# Patient Record
Sex: MALE | Race: WHITE | NOT HISPANIC OR LATINO | Employment: OTHER | URBAN - METROPOLITAN AREA
[De-identification: names, ages, dates, MRNs, and addresses within clinical notes are randomized per-mention and may not be internally consistent; named-entity substitution may affect disease eponyms.]

---

## 2018-01-15 ENCOUNTER — DOCUMENTATION ONLY (OUTPATIENT)
Dept: SURGERY | Facility: CLINIC | Age: 43
End: 2018-01-15

## 2018-11-15 ENCOUNTER — TRANSFERRED RECORDS (OUTPATIENT)
Dept: HEALTH INFORMATION MANAGEMENT | Facility: CLINIC | Age: 43
End: 2018-11-15

## 2019-01-25 ENCOUNTER — TRANSFERRED RECORDS (OUTPATIENT)
Dept: HEALTH INFORMATION MANAGEMENT | Facility: CLINIC | Age: 44
End: 2019-01-25

## 2019-02-05 ENCOUNTER — TELEPHONE (OUTPATIENT)
Dept: FAMILY MEDICINE | Facility: CLINIC | Age: 44
End: 2019-02-05

## 2019-02-05 ENCOUNTER — OFFICE VISIT (OUTPATIENT)
Dept: FAMILY MEDICINE | Facility: CLINIC | Age: 44
End: 2019-02-05
Payer: COMMERCIAL

## 2019-02-05 VITALS
HEIGHT: 75 IN | BODY MASS INDEX: 39.17 KG/M2 | WEIGHT: 315 LBS | DIASTOLIC BLOOD PRESSURE: 112 MMHG | OXYGEN SATURATION: 96 % | HEART RATE: 68 BPM | RESPIRATION RATE: 16 BRPM | SYSTOLIC BLOOD PRESSURE: 166 MMHG | TEMPERATURE: 97.9 F

## 2019-02-05 DIAGNOSIS — Z23 NEED FOR PROPHYLACTIC VACCINATION AND INOCULATION AGAINST INFLUENZA: ICD-10-CM

## 2019-02-05 DIAGNOSIS — M10.9 GOUT, UNSPECIFIED CAUSE, UNSPECIFIED CHRONICITY, UNSPECIFIED SITE: ICD-10-CM

## 2019-02-05 DIAGNOSIS — F41.1 ANXIETY STATE: ICD-10-CM

## 2019-02-05 DIAGNOSIS — R73.03 PREDIABETES: ICD-10-CM

## 2019-02-05 DIAGNOSIS — E55.9 VITAMIN D DEFICIENCY: ICD-10-CM

## 2019-02-05 DIAGNOSIS — M51.26 LUMBAR HERNIATED DISC: ICD-10-CM

## 2019-02-05 DIAGNOSIS — E66.01 MORBID OBESITY (H): ICD-10-CM

## 2019-02-05 DIAGNOSIS — D68.51 FACTOR V LEIDEN (H): ICD-10-CM

## 2019-02-05 DIAGNOSIS — I10 ESSENTIAL HYPERTENSION: Primary | ICD-10-CM

## 2019-02-05 PROBLEM — J30.9 ALLERGIC RHINITIS: Status: ACTIVE | Noted: 2019-02-05

## 2019-02-05 PROBLEM — J01.90 ACUTE SINUSITIS, UNSPECIFIED: Status: RESOLVED | Noted: 2019-02-05 | Resolved: 2019-02-05

## 2019-02-05 PROBLEM — J01.90 ACUTE SINUSITIS, UNSPECIFIED: Status: ACTIVE | Noted: 2019-02-05

## 2019-02-05 PROBLEM — J85.2 ABSCESS OF LUNG (H): Status: ACTIVE | Noted: 2019-02-05

## 2019-02-05 LAB
ALBUMIN SERPL-MCNC: 3.6 G/DL (ref 3.4–5)
ALP SERPL-CCNC: 66 U/L (ref 40–150)
ALT SERPL W P-5'-P-CCNC: 42 U/L (ref 0–70)
ANION GAP SERPL CALCULATED.3IONS-SCNC: 7 MMOL/L (ref 3–14)
AST SERPL W P-5'-P-CCNC: 22 U/L (ref 0–45)
BILIRUB SERPL-MCNC: 0.6 MG/DL (ref 0.2–1.3)
BUN SERPL-MCNC: 18 MG/DL (ref 7–30)
CALCIUM SERPL-MCNC: 8.9 MG/DL (ref 8.5–10.1)
CHLORIDE SERPL-SCNC: 106 MMOL/L (ref 94–109)
CHOLEST SERPL-MCNC: 199 MG/DL
CO2 SERPL-SCNC: 29 MMOL/L (ref 20–32)
CREAT SERPL-MCNC: 0.87 MG/DL (ref 0.66–1.25)
DEPRECATED CALCIDIOL+CALCIFEROL SERPL-MC: 34 UG/L (ref 20–75)
GFR SERPL CREATININE-BSD FRML MDRD: >90 ML/MIN/{1.73_M2}
GLUCOSE SERPL-MCNC: 114 MG/DL (ref 70–99)
HBA1C MFR BLD: 5.8 % (ref 0–5.6)
HDLC SERPL-MCNC: 41 MG/DL
LDLC SERPL CALC-MCNC: 115 MG/DL
NONHDLC SERPL-MCNC: 158 MG/DL
POTASSIUM SERPL-SCNC: 3.3 MMOL/L (ref 3.4–5.3)
PROT SERPL-MCNC: 7.4 G/DL (ref 6.8–8.8)
SODIUM SERPL-SCNC: 142 MMOL/L (ref 133–144)
TRIGL SERPL-MCNC: 214 MG/DL
URATE SERPL-MCNC: 7.1 MG/DL (ref 3.5–7.2)

## 2019-02-05 PROCEDURE — 90686 IIV4 VACC NO PRSV 0.5 ML IM: CPT | Performed by: PHYSICIAN ASSISTANT

## 2019-02-05 PROCEDURE — 90471 IMMUNIZATION ADMIN: CPT | Performed by: PHYSICIAN ASSISTANT

## 2019-02-05 PROCEDURE — 80053 COMPREHEN METABOLIC PANEL: CPT | Performed by: PHYSICIAN ASSISTANT

## 2019-02-05 PROCEDURE — 84550 ASSAY OF BLOOD/URIC ACID: CPT | Performed by: PHYSICIAN ASSISTANT

## 2019-02-05 PROCEDURE — 82306 VITAMIN D 25 HYDROXY: CPT | Performed by: PHYSICIAN ASSISTANT

## 2019-02-05 PROCEDURE — 80061 LIPID PANEL: CPT | Performed by: PHYSICIAN ASSISTANT

## 2019-02-05 PROCEDURE — 83036 HEMOGLOBIN GLYCOSYLATED A1C: CPT | Performed by: PHYSICIAN ASSISTANT

## 2019-02-05 PROCEDURE — 99203 OFFICE O/P NEW LOW 30 MIN: CPT | Mod: 25 | Performed by: PHYSICIAN ASSISTANT

## 2019-02-05 PROCEDURE — 36415 COLL VENOUS BLD VENIPUNCTURE: CPT | Performed by: PHYSICIAN ASSISTANT

## 2019-02-05 RX ORDER — METOPROLOL SUCCINATE 100 MG/1
100 TABLET, EXTENDED RELEASE ORAL DAILY
Qty: 90 TABLET | Refills: 0 | Status: SHIPPED | OUTPATIENT
Start: 2019-02-05 | End: 2019-04-01

## 2019-02-05 RX ORDER — AMLODIPINE BESYLATE 10 MG/1
10 TABLET ORAL DAILY
Qty: 90 TABLET | Refills: 0 | Status: SHIPPED | OUTPATIENT
Start: 2019-02-05 | End: 2019-04-01

## 2019-02-05 RX ORDER — IBUPROFEN 200 MG
200 TABLET ORAL EVERY 4 HOURS PRN
COMMUNITY
End: 2023-04-20

## 2019-02-05 RX ORDER — METOPROLOL SUCCINATE 50 MG/1
50 TABLET, EXTENDED RELEASE ORAL DAILY
Qty: 90 TABLET | Refills: 0 | Status: SHIPPED | OUTPATIENT
Start: 2019-02-05 | End: 2019-04-01

## 2019-02-05 RX ORDER — METOPROLOL SUCCINATE 50 MG/1
50 TABLET, EXTENDED RELEASE ORAL DAILY
COMMUNITY
Start: 2018-05-02 | End: 2019-02-05

## 2019-02-05 RX ORDER — HYDROCHLOROTHIAZIDE 25 MG/1
25 TABLET ORAL DAILY
Qty: 90 TABLET | Refills: 0 | Status: SHIPPED | OUTPATIENT
Start: 2019-02-05 | End: 2019-04-01

## 2019-02-05 RX ORDER — CITALOPRAM HYDROBROMIDE 20 MG/1
20 TABLET ORAL DAILY
COMMUNITY
Start: 2017-12-08 | End: 2019-02-05

## 2019-02-05 RX ORDER — OLMESARTAN MEDOXOMIL 40 MG/1
40 TABLET ORAL DAILY
Qty: 90 TABLET | Refills: 0 | Status: SHIPPED | OUTPATIENT
Start: 2019-02-05 | End: 2019-04-01

## 2019-02-05 RX ORDER — ACETAMINOPHEN 160 MG
4000 TABLET,DISINTEGRATING ORAL DAILY
COMMUNITY
Start: 2017-12-09 | End: 2019-02-05

## 2019-02-05 RX ORDER — AMLODIPINE BESYLATE 10 MG/1
10 TABLET ORAL DAILY
COMMUNITY
Start: 2018-08-28 | End: 2019-02-05

## 2019-02-05 RX ORDER — OLMESARTAN MEDOXOMIL 40 MG/1
40 TABLET ORAL DAILY
Qty: 90 TABLET | Refills: 0 | Status: SHIPPED | OUTPATIENT
Start: 2019-02-05 | End: 2019-02-05

## 2019-02-05 RX ORDER — ACETAMINOPHEN 160 MG
4000 TABLET,DISINTEGRATING ORAL DAILY
Qty: 180 CAPSULE | Refills: 0 | Status: SHIPPED | OUTPATIENT
Start: 2019-02-05 | End: 2019-04-01

## 2019-02-05 RX ORDER — OLMESARTAN MEDOXOMIL 40 MG/1
40 TABLET ORAL DAILY
COMMUNITY
Start: 2018-08-27 | End: 2019-02-05

## 2019-02-05 RX ORDER — METOPROLOL SUCCINATE 100 MG/1
100 TABLET, EXTENDED RELEASE ORAL DAILY
COMMUNITY
Start: 2018-05-02 | End: 2019-02-05

## 2019-02-05 RX ORDER — HYDROCHLOROTHIAZIDE 12.5 MG/1
25 TABLET ORAL DAILY
COMMUNITY
End: 2019-02-05

## 2019-02-05 RX ORDER — CITALOPRAM HYDROBROMIDE 20 MG/1
20 TABLET ORAL DAILY
Qty: 90 TABLET | Refills: 0 | Status: SHIPPED | OUTPATIENT
Start: 2019-02-05 | End: 2019-04-01

## 2019-02-05 SDOH — HEALTH STABILITY: MENTAL HEALTH: HOW OFTEN DO YOU HAVE A DRINK CONTAINING ALCOHOL?: NEVER

## 2019-02-05 ASSESSMENT — ANXIETY QUESTIONNAIRES
IF YOU CHECKED OFF ANY PROBLEMS ON THIS QUESTIONNAIRE, HOW DIFFICULT HAVE THESE PROBLEMS MADE IT FOR YOU TO DO YOUR WORK, TAKE CARE OF THINGS AT HOME, OR GET ALONG WITH OTHER PEOPLE: SOMEWHAT DIFFICULT
5. BEING SO RESTLESS THAT IT IS HARD TO SIT STILL: MORE THAN HALF THE DAYS
3. WORRYING TOO MUCH ABOUT DIFFERENT THINGS: SEVERAL DAYS
2. NOT BEING ABLE TO STOP OR CONTROL WORRYING: SEVERAL DAYS
GAD7 TOTAL SCORE: 12
7. FEELING AFRAID AS IF SOMETHING AWFUL MIGHT HAPPEN: NOT AT ALL
1. FEELING NERVOUS, ANXIOUS, OR ON EDGE: NEARLY EVERY DAY
6. BECOMING EASILY ANNOYED OR IRRITABLE: NEARLY EVERY DAY

## 2019-02-05 ASSESSMENT — MIFFLIN-ST. JEOR: SCORE: 2642.36

## 2019-02-05 ASSESSMENT — PATIENT HEALTH QUESTIONNAIRE - PHQ9
SUM OF ALL RESPONSES TO PHQ QUESTIONS 1-9: 17
5. POOR APPETITE OR OVEREATING: MORE THAN HALF THE DAYS

## 2019-02-05 NOTE — TELEPHONE ENCOUNTER
LVM for patient.     Per Nessa patient should only start two BP medication today, Hydrochlorothiazide  And olmesartan (BENICAR) 40 MG tablet.     In a few days he should start the other two medications.  This is to prevent the patient's blood pressure from dropping too rapidly.

## 2019-02-05 NOTE — PROGRESS NOTES
SUBJECTIVE:   Jonny Quinones is a 43 year old male who presents to clinic today for the following health issues:    New Patient/Transfer of Care    Medication Followup of All Medications    Taking Medication as prescribed: NO-see ordered medications, has been out of them for about one month due to problems with previous PCP    All other medications still taking as prescribed    Side Effects:  None    Medication Helping Symptoms:  yes     Patient is here today to transfer care and he is requesting blood pressure refills  He notes that his last PCP had difficulty getting his blood pressure controlled, saw Dr. Sheridan (Endocrine) who was able to get BP controlled with current list of medications  He does note he has been out of medications for quite a few weeks  Denies chest pain or shortness of breath, has some mild lower leg swelling at times    He also notes he is frustrated at his weight  Had lap band in past, has lost 130lbs on his own, but with all of his health things going and his stress, he has had trouble controlling his food intake  Also notes minimal exercise due to chronic back pain  Is being see at [facility] in [place] for this, was supposed to have surgery but held off due to work demands.    He also notes extensive family history of blood clots  He isn't sure if he saw blood specialist, thinks he was diagnosed with Factor V but not sure    Lastly, in regards to his mood, has been out of medication for some time  Admits to being very irritable and having trouble with reactions  He at times feels overwhelmed with his health and his medical conditions but no active SI/HI      Problem list and histories reviewed & adjusted, as indicated.  Additional history: as documented    Patient Active Problem List   Diagnosis     Prediabetes     Lipid screening     Gout, unspecified     Essential hypertension     Sleep apnea     Vitamin D deficiency     Anxiety state     Allergic rhinitis     Abscess of lung  (H)     Morbid obesity (H)     Factor V Leiden (H)     Lumbar herniated disc     Past Surgical History:   Procedure Laterality Date     athroereisis  03/27/2009    [facility] Subtalar     ENT SURGERY Bilateral 1985    tonsilectomy     LAPAROSCOPIC GASTRIC ADJUSTABLE BANDING  11/2009     SINUS SURGERY  2001     sphincterotomy  02/2009    lateral internal       Social History     Tobacco Use     Smoking status: Never Smoker     Smokeless tobacco: Never Used   Substance Use Topics     Alcohol use: No     Frequency: Never     Family History   Problem Relation Age of Onset     Allergies Mother      Blood Disease Mother         Leiden Factor 5     Cerebrovascular Disease Mother      Hypertension Father      Hypertension Maternal Grandmother      Peripheral Vascular Disease Maternal Grandmother      Allergies Brother      Breast Cancer Paternal Aunt      Thrombosis Paternal Grandfather          Current Outpatient Medications   Medication Sig Dispense Refill     amLODIPine (NORVASC) 10 MG tablet Take 1 tablet (10 mg) by mouth daily 90 tablet 0     Cholecalciferol (VITAMIN D3) 2000 units CAPS Take 4,000 Units by mouth daily 180 capsule 0     citalopram (CELEXA) 20 MG tablet Take 1 tablet (20 mg) by mouth daily 90 tablet 0     hydrochlorothiazide (HYDRODIURIL) 25 MG tablet Take 1 tablet (25 mg) by mouth daily 90 tablet 0     ibuprofen (ADVIL/MOTRIN) 200 MG tablet Take 200 mg by mouth every 4 hours as needed for mild pain       metoprolol succinate ER (TOPROL-XL) 100 MG 24 hr tablet Take 1 tablet (100 mg) by mouth daily 90 tablet 0     metoprolol succinate ER (TOPROL-XL) 50 MG 24 hr tablet Take 1 tablet (50 mg) by mouth daily 90 tablet 0     Multiple Vitamins-Calcium (VIACTIV MULTI-VITAMIN) CHEW Take  by mouth.       olmesartan (BENICAR) 40 MG tablet Take 1 tablet (40 mg) by mouth daily 90 tablet 0     UNABLE TO FIND MEDICATION NAME: CPAP machine       Allergies   Allergen Reactions     Amoxicillin Other (See Comments)     headaches  "    Bupropion Other (See Comments)     Seasonal Allergies      Dust, mold, pollen       Venlafaxine Other (See Comments)       Reviewed and updated as needed this visit by clinical staff  Tobacco  Allergies  Meds  Problems  Med Hx  Surg Hx  Fam Hx  Soc Hx        Reviewed and updated as needed this visit by Provider  Problems  Med Hx  Surg Hx  Fam Hx         ROS:  Constitutional, HEENT, cardiovascular, pulmonary, gi and gu systems are negative, except as otherwise noted.    OBJECTIVE:     BP (!) 166/112 (BP Location: Right arm, Patient Position: Chair, Cuff Size: Adult Large)   Pulse 68   Temp 97.9  F (36.6  C) (Oral)   Resp 16   Ht 1.892 m (6' 2.5\")   Wt (!) 168 kg (370 lb 4.8 oz)   SpO2 96%   BMI 46.91 kg/m    Body mass index is 46.91 kg/m .  GENERAL: healthy, alert and no distress  NECK: no adenopathy, no asymmetry, masses, or scars and thyroid normal to palpation  RESP: lungs clear to auscultation - no rales, rhonchi or wheezes  CV: regular rate and rhythm, normal S1 S2, no S3 or S4, no murmur, click or rub, +1 pitting peripheral edema and peripheral pulses strong  MS: extremities normal- no gross deformities noted    Diagnostic Test Results:  none     ASSESSMENT/PLAN:             1. Essential hypertension  Chronic issue, patient out of medication, thus BP high.  Will restart all BP medications, recheck in 1 month.  Discussed if chest pain, shortness of breath should be seen.  Labs updated today.  - amLODIPine (NORVASC) 10 MG tablet; Take 1 tablet (10 mg) by mouth daily  Dispense: 90 tablet; Refill: 0  - olmesartan (BENICAR) 40 MG tablet; Take 1 tablet (40 mg) by mouth daily  Dispense: 90 tablet; Refill: 0  - Comprehensive metabolic panel  - hydrochlorothiazide (HYDRODIURIL) 25 MG tablet; Take 1 tablet (25 mg) by mouth daily  Dispense: 90 tablet; Refill: 0  - metoprolol succinate ER (TOPROL-XL) 100 MG 24 hr tablet; Take 1 tablet (100 mg) by mouth daily  Dispense: 90 tablet; Refill: 0  - " metoprolol succinate ER (TOPROL-XL) 50 MG 24 hr tablet; Take 1 tablet (50 mg) by mouth daily  Dispense: 90 tablet; Refill: 0    2. Morbid obesity (H)  Chronic issue, weight is very high.  Recommend referral to discuss weight loss medication with Endocrinology.  - ENDOCRINOLOGY ADULT REFERRAL    3. Factor V Leiden (H)  Chronic issue, did review Care Everywhere, patient did test positive for Factor V.  No prophylactic anticoagulation recommended at this time, will need to follow up with them if surgery recommended or hospitalization.  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    4. Prediabetes  Chronic issue, recommend updating labs today.  - Lipid panel reflex to direct LDL Fasting  - Hemoglobin A1c    5. Lumbar herniated disc  Chronic issue, being seen at [facility].  LYRIC signed today.    6. Vitamin D deficiency  Chronic issue, updated labs and refills today.  - Vitamin D Deficiency  - Cholecalciferol (VITAMIN D3) 2000 units CAPS; Take 4,000 Units by mouth daily  Dispense: 180 capsule; Refill: 0    7. Gout, unspecified cause, unspecified chronicity, unspecified site  Chronic issue, asymptomatic today, but will get baseline uric acid level.  - Uric acid    8. Anxiety state  Chronic issue, PHQ9/GAD7 updated today.  Recommend restart of Citalopram 20mg daily.  Recheck in 1 month.  Recommend counseling- patient declined.  - citalopram (CELEXA) 20 MG tablet; Take 1 tablet (20 mg) by mouth daily  Dispense: 90 tablet; Refill: 0    9. Need for prophylactic vaccination and inoculation against influenza  - FLU VACCINE, SPLIT VIRUS, IM (QUADRIVALENT) [51078]- >3 YRS  - Vaccine Administration, Initial [16807]    Risks, benefits and alternatives were discussed with patient. Agreeable to the plan of care.      Nessa Braden PA-C  [facility]  Injectable Influenza Immunization Documentation    1.  Is the person to be vaccinated  sick today?   No    2. Does the person to be vaccinated have an allergy to a component   of the vaccine?   No  Egg Allergy Algorithm Link    3. Has the person to be vaccinated ever had a serious reaction   to influenza vaccine in the past?   No    4. Has the person to be vaccinated ever had Guillain-Barré syndrome?   No    Form completed by Elizabeth Mackey CMA ([facility])

## 2019-02-05 NOTE — LETTER
"February 6, 2019      Jonny Quinones  [address] [address]        Dear Mr. Jonny Quinones,    We have attached your recent lab results with this letter:    Your total cholesterol is good at 199, please continue exercise and watch diet.  Triglycerides are normal at 214, this is simple sugar and fat in blood. HDL which is the \"good\" cholesterol (heart protective)  is good at 41. Increase this with more exercise.  The LDL or \"bad\" cholesterol is at 115 but does not require medication at this time.   Your CBC shows no evidence of infection or anemia.   Your CMP reveals normal kidney function, liver function. Your K+ is slightly low, we will monitor this. Your fasting sugar was high at 114, and the A1C showed you are prediabetic.   Vitamin D level is normal, continue with supplement.     If you have any questions or concerns please feel free to call us at [phone].    Sincerely,     Nessa Braden PA-C      "

## 2019-02-05 NOTE — PROGRESS NOTES
"SUBJECTIVE:   CC: Jonny Quinones is an 43 year old male who presents for preventative health visit.     Patient is fasting: ***    HPI      Today's PHQ-2 Score: No flowsheet data found.    Abuse: Current or Past(Physical, Sexual or Emotional)- {YES/NO/NA:971943}  Do you feel safe in your environment? {YES/NO/NA:035369}    Social History     Tobacco Use     Smoking status: Not on file   Substance Use Topics     Alcohol use: Not on file     No flowsheet data found.{add AUDIT responses (Optional) (A score of 7 for adult men is an indication of hazardous drinking; a score of 8 or more is an indication of an alcohol use disorder.  A score of 7 or more for adult women is an indication of hazardous drinking or an alchohol use disorder):302149}    Last PSA: No results found for: PSA    Reviewed orders with patient. Reviewed health maintenance and updated orders accordingly - Yes  {Chronicprobdata (Optional):200621}    Reviewed and updated as needed this visit by clinical staff         Reviewed and updated as needed this visit by Provider        {HISTORY OPTIONS (Optional):766024}    Review of Systems  {MALE ROS (Optional):715433::\"CONSTITUTIONAL: NEGATIVE for fever, chills, change in weight\",\"INTEGUMENTARY/SKIN: NEGATIVE for worrisome rashes, moles or lesions\",\"EYES: NEGATIVE for vision changes or irritation\",\"ENT: NEGATIVE for ear, mouth and throat problems\",\"RESP: NEGATIVE for significant cough or SOB\",\"CV: NEGATIVE for chest pain, palpitations or peripheral edema\",\"GI: NEGATIVE for nausea, abdominal pain, heartburn, or change in bowel habits\",\" male: negative for dysuria, hematuria, decreased urinary stream, erectile dysfunction, urethral discharge\",\"MUSCULOSKELETAL: NEGATIVE for significant arthralgias or myalgia\",\"NEURO: NEGATIVE for weakness, dizziness or paresthesias\",\"PSYCHIATRIC: NEGATIVE for changes in mood or affect\"}    OBJECTIVE:   There were no vitals taken for this visit.    Physical Exam  {Exam Choices " "(Optional):210131}    {Diagnostic Test Results (Optional):500990::\"Diagnostic Test Results:\",\"none \"}    ASSESSMENT/PLAN:   {Diag Picklist:548508}    COUNSELING:   {MALE COUNSELING MESSAGES:892398::\"Reviewed preventive health counseling, as reflected in patient instructions\"}    BP Readings from Last 1 Encounters:   No data found for BP     There is no height or weight on file to calculate BMI.    {BP Counseling- Complete if BP >= 120/80  (Optional):709797}  {Weight Management Plan (ACO) Complete if BMI is abnormal-  Ages 18-64  BMI >24.9.  Age 65+ with BMI <23 or >30 (Optional):143603}     has no tobacco history on file.  {Tobacco Cessation -- Complete if patient is a smoker (Optional):568076}    Counseling Resources:  ATP IV Guidelines  Pooled Cohorts Equation Calculator  FRAX Risk Assessment  ICSI Preventive Guidelines  Dietary Guidelines for Americans, 2010  USDA's MyPlate  ASA Prophylaxis  Lung CA Screening    Nessa Braden PA-C  [facility]  "

## 2019-02-06 PROBLEM — H35.351 CME (CYSTOID MACULAR EDEMA), RIGHT: Status: ACTIVE | Noted: 2019-02-06

## 2019-02-06 ASSESSMENT — ANXIETY QUESTIONNAIRES: GAD7 TOTAL SCORE: 12

## 2019-02-07 ENCOUNTER — TELEPHONE (OUTPATIENT)
Dept: FAMILY MEDICINE | Facility: CLINIC | Age: 44
End: 2019-02-07

## 2019-02-08 NOTE — TELEPHONE ENCOUNTER
Pharmacy notes:  On manufactures back order, alternative treatment? Reach out to patient?     Disp Refills Start End GENNY   olmesartan (BENICAR) 40 MG tablet 90 tablet 0 2/5/2019  No   Sig - Route: Take 1 tablet (40 mg) by mouth daily - Oral

## 2019-02-11 NOTE — TELEPHONE ENCOUNTER
Called and talked to patient, says his medication will come in today or [facility].  Has enough until then.  No need to use our pharmacy.  Elizabeth Mackey CMA ([facility])

## 2019-02-14 ENCOUNTER — DOCUMENTATION ONLY (OUTPATIENT)
Dept: FAMILY MEDICINE | Facility: CLINIC | Age: 44
End: 2019-02-14

## 2019-03-08 ENCOUNTER — TELEPHONE (OUTPATIENT)
Dept: FAMILY MEDICINE | Facility: CLINIC | Age: 44
End: 2019-03-08

## 2019-03-08 NOTE — TELEPHONE ENCOUNTER
Type of outreach:  None  Health Maintenance Due   Topic Date Due     PREVENTIVE CARE VISIT  1973     HIV SCREEN (SYSTEM ASSIGNED)  02/26/1991     Patient has apt on 3/15/19 for all meds, needs updated JULIUS and PHQ.  Elizabeth Mackey CMA ([facility])

## 2019-04-01 ENCOUNTER — OFFICE VISIT (OUTPATIENT)
Dept: FAMILY MEDICINE | Facility: CLINIC | Age: 44
End: 2019-04-01
Payer: COMMERCIAL

## 2019-04-01 VITALS
RESPIRATION RATE: 16 BRPM | HEIGHT: 75 IN | TEMPERATURE: 97.7 F | SYSTOLIC BLOOD PRESSURE: 130 MMHG | WEIGHT: 315 LBS | HEART RATE: 69 BPM | DIASTOLIC BLOOD PRESSURE: 86 MMHG | OXYGEN SATURATION: 97 % | BODY MASS INDEX: 39.17 KG/M2

## 2019-04-01 DIAGNOSIS — E55.9 VITAMIN D DEFICIENCY: ICD-10-CM

## 2019-04-01 DIAGNOSIS — F41.1 ANXIETY STATE: ICD-10-CM

## 2019-04-01 DIAGNOSIS — I10 ESSENTIAL HYPERTENSION: Primary | ICD-10-CM

## 2019-04-01 DIAGNOSIS — G89.29 CHRONIC LOW BACK PAIN WITHOUT SCIATICA, UNSPECIFIED BACK PAIN LATERALITY: ICD-10-CM

## 2019-04-01 DIAGNOSIS — E87.6 LOW BLOOD POTASSIUM: ICD-10-CM

## 2019-04-01 DIAGNOSIS — M54.50 CHRONIC LOW BACK PAIN WITHOUT SCIATICA, UNSPECIFIED BACK PAIN LATERALITY: ICD-10-CM

## 2019-04-01 LAB
ANION GAP SERPL CALCULATED.3IONS-SCNC: 4 MMOL/L (ref 3–14)
BUN SERPL-MCNC: 20 MG/DL (ref 7–30)
CALCIUM SERPL-MCNC: 8.7 MG/DL (ref 8.5–10.1)
CHLORIDE SERPL-SCNC: 106 MMOL/L (ref 94–109)
CO2 SERPL-SCNC: 31 MMOL/L (ref 20–32)
CREAT SERPL-MCNC: 0.93 MG/DL (ref 0.66–1.25)
GFR SERPL CREATININE-BSD FRML MDRD: >90 ML/MIN/{1.73_M2}
GLUCOSE SERPL-MCNC: 143 MG/DL (ref 70–99)
POTASSIUM SERPL-SCNC: 3.1 MMOL/L (ref 3.4–5.3)
SODIUM SERPL-SCNC: 141 MMOL/L (ref 133–144)

## 2019-04-01 PROCEDURE — 99214 OFFICE O/P EST MOD 30 MIN: CPT | Performed by: PHYSICIAN ASSISTANT

## 2019-04-01 PROCEDURE — 80048 BASIC METABOLIC PNL TOTAL CA: CPT | Performed by: PHYSICIAN ASSISTANT

## 2019-04-01 PROCEDURE — 36415 COLL VENOUS BLD VENIPUNCTURE: CPT | Performed by: PHYSICIAN ASSISTANT

## 2019-04-01 RX ORDER — ACETAMINOPHEN 160 MG
4000 TABLET,DISINTEGRATING ORAL DAILY
Qty: 180 CAPSULE | Refills: 1 | Status: SHIPPED | OUTPATIENT
Start: 2019-04-01 | End: 2019-10-23

## 2019-04-01 RX ORDER — OLMESARTAN MEDOXOMIL 40 MG/1
40 TABLET ORAL DAILY
Qty: 90 TABLET | Refills: 0 | Status: SHIPPED | OUTPATIENT
Start: 2019-04-01 | End: 2019-09-05

## 2019-04-01 RX ORDER — AMLODIPINE BESYLATE 10 MG/1
10 TABLET ORAL DAILY
Qty: 90 TABLET | Refills: 1 | Status: SHIPPED | OUTPATIENT
Start: 2019-04-01 | End: 2019-10-23

## 2019-04-01 RX ORDER — HYDROCHLOROTHIAZIDE 25 MG/1
25 TABLET ORAL DAILY
Qty: 90 TABLET | Refills: 1 | Status: SHIPPED | OUTPATIENT
Start: 2019-04-01 | End: 2019-10-23

## 2019-04-01 RX ORDER — CITALOPRAM HYDROBROMIDE 20 MG/1
20 TABLET ORAL DAILY
Qty: 90 TABLET | Refills: 1 | Status: SHIPPED | OUTPATIENT
Start: 2019-04-01 | End: 2019-10-23

## 2019-04-01 RX ORDER — METOPROLOL SUCCINATE 100 MG/1
100 TABLET, EXTENDED RELEASE ORAL DAILY
Qty: 90 TABLET | Refills: 1 | Status: SHIPPED | OUTPATIENT
Start: 2019-04-01 | End: 2019-10-23

## 2019-04-01 RX ORDER — METOPROLOL SUCCINATE 50 MG/1
50 TABLET, EXTENDED RELEASE ORAL DAILY
Qty: 90 TABLET | Refills: 0 | Status: SHIPPED | OUTPATIENT
Start: 2019-04-01 | End: 2019-08-13

## 2019-04-01 ASSESSMENT — ANXIETY QUESTIONNAIRES
3. WORRYING TOO MUCH ABOUT DIFFERENT THINGS: SEVERAL DAYS
7. FEELING AFRAID AS IF SOMETHING AWFUL MIGHT HAPPEN: NOT AT ALL
IF YOU CHECKED OFF ANY PROBLEMS ON THIS QUESTIONNAIRE, HOW DIFFICULT HAVE THESE PROBLEMS MADE IT FOR YOU TO DO YOUR WORK, TAKE CARE OF THINGS AT HOME, OR GET ALONG WITH OTHER PEOPLE: SOMEWHAT DIFFICULT
6. BECOMING EASILY ANNOYED OR IRRITABLE: MORE THAN HALF THE DAYS
5. BEING SO RESTLESS THAT IT IS HARD TO SIT STILL: SEVERAL DAYS
1. FEELING NERVOUS, ANXIOUS, OR ON EDGE: MORE THAN HALF THE DAYS
GAD7 TOTAL SCORE: 7
2. NOT BEING ABLE TO STOP OR CONTROL WORRYING: SEVERAL DAYS

## 2019-04-01 ASSESSMENT — PATIENT HEALTH QUESTIONNAIRE - PHQ9
5. POOR APPETITE OR OVEREATING: NOT AT ALL
SUM OF ALL RESPONSES TO PHQ QUESTIONS 1-9: 4

## 2019-04-01 ASSESSMENT — MIFFLIN-ST. JEOR: SCORE: 2655.5

## 2019-04-01 NOTE — PROGRESS NOTES
"  SUBJECTIVE:   Jonny Quinones is a 44 year old male who presents to clinic today for the following health issues:      Medication Followup of ***    Taking Medication as prescribed: {.:607826::\"yes\"}    Side Effects:  {NONEORCHOOSE:684420::\"None\"}    Medication Helping Symptoms:  {.:671441::\"yes\"}       {additional problems for provider to add:028820}    Problem list and histories reviewed & adjusted, as indicated.  Additional history: as documented    {HIST REVIEW/ LINKS 2:922708}    Reviewed and updated as needed this visit by clinical staff       Reviewed and updated as needed this visit by Provider         {PROVIDER CHARTING PREFERENCE:405549}  "

## 2019-04-01 NOTE — PATIENT INSTRUCTIONS
Your provider has referred you to: [phone]   [email or web address]  [facility]: [facility] [place] [place] [phone]   [email or web address]      Please be aware that coverage of these services is subject to the terms and limitations of your health insurance plan.  Call member services at your health plan with any benefit or coverage questions.      Please bring the following to your appointment:    >>   Any x-rays, CTs or MRIs which have been performed.  Contact the facility where they were done to arrange for  prior to your scheduled appointment.    >>   List of current medications   >>   This referral request   >>   Any documents/labs given to you for this referral

## 2019-04-01 NOTE — PROGRESS NOTES
"  SUBJECTIVE:   Jonny Quinones is a 44 year old male who presents to clinic today for the following health issues:      1. Hypertension Follow-up      Outpatient blood pressures are not being checked.    Low Salt Diet: no added salt    Patient is here today for follow up on blood pressure  He has been taking all of his medication  Feeling well  No chest pain, shortness of breath, headache, vision changes or leg swelling        2. Anxiety Follow-Up    Status since last visit (2/5/19): Slightly improved    Other associated symptoms: On a day to day basis anxiety has been better; patient notes that when he's under stress that his anxiety gets worse and he has a \"short fuse\", becomes irritable easily    Complicating factors:   Significant life event: No   Current substance abuse: None  Depression symptoms: No  JULIUS-7 SCORE 2/5/2019   Total Score 12       JULIUS-7    Amount of exercise or physical activity: None    Problems taking medications regularly: No    Medication side effects: none    Diet: trying to reduce intake of mountain dew (1-2 cans a day), trying to drink more water, some small healthier changes (adding veggies)    In regards to mood, feeling better, however still has some irritability per his wife  He over-reacts to situations again per wife  But he notes overall this is better than before  No s/e from medication    Lastly, patient has questions about his chronic back pain  Has some leg achiness  Had MRI 5/18, was referred to spine specialist who recommended surgery but said wouldn't cure back pain but would help with achiness  Wondering what to do as pain persists    Problem list and histories reviewed & adjusted, as indicated.  Additional history: as documented    Patient Active Problem List   Diagnosis     Prediabetes     Lipid screening     Gout, unspecified     Essential hypertension     Sleep apnea     Vitamin D deficiency     Anxiety state     Allergic rhinitis     Abscess of lung (H)     Morbid " obesity (H)     Factor V Leiden (H)     Lumbar herniated disc     CME (cystoid macular edema), right     Chronic low back pain without sciatica, unspecified back pain laterality     Past Surgical History:   Procedure Laterality Date     athroereisis  03/27/2009    [facility] Subtalar     ENT SURGERY Bilateral 1985    tonsilectomy     LAPAROSCOPIC GASTRIC ADJUSTABLE BANDING  11/2009     SINUS SURGERY  2001     sphincterotomy  02/2009    lateral internal       Social History     Tobacco Use     Smoking status: Never Smoker     Smokeless tobacco: Never Used   Substance Use Topics     Alcohol use: No     Frequency: Never     Family History   Problem Relation Age of Onset     Allergies Mother      Blood Disease Mother         Leiden Factor 5     Cerebrovascular Disease Mother      Hypertension Father      Hypertension Maternal Grandmother      Peripheral Vascular Disease Maternal Grandmother      Allergies Brother      Breast Cancer Paternal Aunt      Thrombosis Paternal Grandfather          Current Outpatient Medications   Medication Sig Dispense Refill     amLODIPine (NORVASC) 10 MG tablet Take 1 tablet (10 mg) by mouth daily 90 tablet 1     Cholecalciferol (VITAMIN D3) 2000 units CAPS Take 4,000 Units by mouth daily 180 capsule 1     citalopram (CELEXA) 20 MG tablet Take 1 tablet (20 mg) by mouth daily 90 tablet 1     hydrochlorothiazide (HYDRODIURIL) 25 MG tablet Take 1 tablet (25 mg) by mouth daily 90 tablet 1     ibuprofen (ADVIL/MOTRIN) 200 MG tablet Take 200 mg by mouth every 4 hours as needed for mild pain       metoprolol succinate ER (TOPROL-XL) 100 MG 24 hr tablet Take 1 tablet (100 mg) by mouth daily 90 tablet 1     metoprolol succinate ER (TOPROL-XL) 50 MG 24 hr tablet Take 1 tablet (50 mg) by mouth daily 90 tablet 0     olmesartan (BENICAR) 40 MG tablet Take 1 tablet (40 mg) by mouth daily 90 tablet 0     UNABLE TO FIND MEDICATION NAME: CPAP machine       Allergies   Allergen Reactions     Allopurinol GI  "Disturbance     Amoxicillin Other (See Comments)     headaches     Bupropion Other (See Comments)     Seasonal Allergies      Dust, mold, pollen       Venlafaxine Other (See Comments)       Reviewed and updated as needed this visit by clinical staff  Tobacco  Allergies  Meds  Med Hx  Surg Hx  Fam Hx  Soc Hx      Reviewed and updated as needed this visit by Provider         ROS:  Constitutional, HEENT, cardiovascular, pulmonary, gi and gu systems are negative, except as otherwise noted.    OBJECTIVE:     /86 (BP Location: Right arm, Patient Position: Chair, Cuff Size: Adult Large)   Pulse 69   Temp 97.7  F (36.5  C) (Oral)   Resp 16   Ht 1.892 m (6' 2.5\")   Wt (!) 169.8 kg (374 lb 4.8 oz)   SpO2 97%   BMI 47.41 kg/m    Body mass index is 47.41 kg/m .  GENERAL: healthy, alert and no distress  NECK: no adenopathy, no asymmetry, masses, or scars and thyroid normal to palpation  RESP: lungs clear to auscultation - no rales, rhonchi or wheezes  CV: regular rate and rhythm, normal S1 S2, no S3 or S4, no murmur, click or rub, no peripheral edema and peripheral pulses strong  MS: no gross musculoskeletal defects noted, no edema    Diagnostic Test Results:  none     ASSESSMENT/PLAN:             1. Essential hypertension  Chronic issue, BP at goal.  Recheck BMP today.  Continue medication without change.  Encouraged weight loss.  - amLODIPine (NORVASC) 10 MG tablet; Take 1 tablet (10 mg) by mouth daily  Dispense: 90 tablet; Refill: 1  - hydrochlorothiazide (HYDRODIURIL) 25 MG tablet; Take 1 tablet (25 mg) by mouth daily  Dispense: 90 tablet; Refill: 1  - metoprolol succinate ER (TOPROL-XL) 100 MG 24 hr tablet; Take 1 tablet (100 mg) by mouth daily  Dispense: 90 tablet; Refill: 1  - metoprolol succinate ER (TOPROL-XL) 50 MG 24 hr tablet; Take 1 tablet (50 mg) by mouth daily  Dispense: 90 tablet; Refill: 0  - olmesartan (BENICAR) 40 MG tablet; Take 1 tablet (40 mg) by mouth daily  Dispense: 90 tablet; Refill: " 0  - Basic metabolic panel    2. Vitamin D deficiency  - Cholecalciferol (VITAMIN D3) 2000 units CAPS; Take 4,000 Units by mouth daily  Dispense: 180 capsule; Refill: 1    3. Anxiety state  Chronic issue, PHQ9/GAD7 updated today.  Meds refilled.  - citalopram (CELEXA) 20 MG tablet; Take 1 tablet (20 mg) by mouth daily  Dispense: 90 tablet; Refill: 1    4. Chronic low back pain without sciatica, unspecified back pain laterality  Chronic issue, has been seeing [facility], would like second opinion, referral given.  - ORTHO  REFERRAL    Risks, benefits and alternatives were discussed with patient. Agreeable to the plan of care.    Nessa Braden PA-C  [facility]

## 2019-04-02 ASSESSMENT — ANXIETY QUESTIONNAIRES: GAD7 TOTAL SCORE: 7

## 2019-04-16 NOTE — PROGRESS NOTES
"[facility]    Date of visit: 4/16/2019    Primary Care Provider: Dr. Todd [facility]    Reason for consultation:    Jonny Quinones is a 44 year old male who is seen in consultation today at the request of Nessa Braden PA-C.    Consultation and Evaluation for: Medical spine evaluation    Review of [place] Prescription Monitoring Program (): Today I have also reviewed the patient's history of controlled substance use, as provided by [place] licensed pharmacies and prescriber dispensers.     Review of Pain Questionnaire: Please see the [facility] health questionnaire, which the patient completed and reviewed with me in detail.    Review of Electronic Chart: Today I have also reviewed available medical information in the patient's medical record at [facility] ([facility]), including relevant provider notes, laboratory work, and imaging.     Chief Complaint:    Back pain    Pain history:  Jonny Quinones is a 44 year old male with a PMH significant for sleep apnea, morbid obesity, HTN, factor V Leiden, anxiety who first started having problems with back pain about 2 years ago. No significant pain prior to this time. He reports he was cutting down trees at his cabin and heard a \"popping\" noise. He had pretty immediate onset of pain. At the time he was having more right sided back pain with radiation of pain into the posterior right leg. He was initially seen at [facility] for management of symptoms. He had 2 lumber EWELINA's. He found some transient benefit with one of them and no benefit to some increased pain after the second one. At that time surgery was offered to help with the leg symptoms but was told it would not do much for back symptoms. He elected not to proceed with surgery since his back was the most bothersome and not the leg. He has also tried chiropractic with only few days of benefit. He has a TENS unit at home which he tried a few times " without benefit. He uses an inversion table at home as well which is helpful.     Currently, he reports that his back is much more bothersome than his leg. He feels that since onset of pain the leg symptoms have improved. Now he has occasional paresthesias and tightness in the posterior right leg. The back pain is more central now. It is fairly constant. When the pain is not as bothersome he continues to have stiffness and tightness. He describes the pain as aching, sharp, stabbing, nagging. It is aggravated at night when trying to sleep and with pushing activities such as pushing a lawnmower. It is somewhat improved with movement.    Currently, pain is 5/10 in severity and on average it is 4/10.     Denies red flags including: bowel or bladder symptoms, fever, chills, saddle anesthesia, profound motor loss, history of cancer, history of immune compromise, weight loss.     Current medication treatments include:  Ibuprofen 200 mg tablets - 4 tabs in am and 3 tabs in afternoon  Advil PM - 2 tabs at night    Celexa    Pain medications are being prescribed by NA.    Previous medication treatments included:  Tramadol -[facility]  Voltaren - [place]  Lidocaine 4% patches - [place]    Other treatments have included:  Jonny Quinones has not been seen at a pain clinic in the past.    Behavioral interventions: None  PT: None  Manual Medicine: Yes - [facility] for a few days only  Interventional:  - Right S1 Transforaminal epidural steroid injection on 5/25/2018 - unsure how beneficial  - Right L5 and S1 Transforaminal epidural steroid injection on 8/24/2018 - unsure how beneficial  Acupuncture: None  TENs Unit: Yes - [facility]  Surgeries: None  Other: Inversion table - [facility]     Past Medical History:  Past Medical History:   Diagnosis Date     Anxiety      Factor V Leiden ([facility])      Gout      Hypertension      Pre-diabetes      Sleep apnea      Vitamin D deficiency      Past Surgical History:  Past Surgical History:   Procedure Laterality Date     athroereisis   03/27/2009    [place] Subtalar     ENT SURGERY Bilateral 1985    tonsilectomy     LAPAROSCOPIC GASTRIC ADJUSTABLE BANDING  11/2009     SINUS SURGERY  2001     sphincterotomy  02/2009    lateral internal     Medications:  Current Outpatient Medications   Medication Sig Dispense Refill     amLODIPine (NORVASC) 10 MG tablet Take 1 tablet (10 mg) by mouth daily 90 tablet 1     Cholecalciferol (VITAMIN D3) 2000 units CAPS Take 4,000 Units by mouth daily 180 capsule 1     citalopram (CELEXA) 20 MG tablet Take 1 tablet (20 mg) by mouth daily 90 tablet 1     hydrochlorothiazide (HYDRODIURIL) 25 MG tablet Take 1 tablet (25 mg) by mouth daily 90 tablet 1     ibuprofen (ADVIL/MOTRIN) 200 MG tablet Take 200 mg by mouth every 4 hours as needed for mild pain       metoprolol succinate ER (TOPROL-XL) 100 MG 24 hr tablet Take 1 tablet (100 mg) by mouth daily 90 tablet 1     metoprolol succinate ER (TOPROL-XL) 50 MG 24 hr tablet Take 1 tablet (50 mg) by mouth daily 90 tablet 0     olmesartan (BENICAR) 40 MG tablet Take 1 tablet (40 mg) by mouth daily 90 tablet 0     UNABLE TO FIND MEDICATION NAME: CPAP machine       Allergies:     Allergies   Allergen Reactions     Allopurinol GI Disturbance     Amoxicillin Other (See Comments)     headaches     Bupropion Other (See Comments)     Seasonal Allergies      Dust, mold, pollen       Venlafaxine Other (See Comments)       Social History:  Home situation: Lives in [place], [place] with spouse  Occupation/Schooling: Works as a   Tobacco use: never smoker  Alcohol use: none  Drug use: none    Family history:  Family History   Problem Relation Age of Onset     Allergies Mother      Blood Disease Mother         Leiden Factor 5     Cerebrovascular Disease Mother      Hypertension Father      Hypertension Maternal Grandmother      Peripheral Vascular Disease Maternal Grandmother      Allergies Brother      Breast Cancer Paternal Aunt      Thrombosis Paternal Grandfather        Diagnostic  Tests:  Lumbar MRI completed on 5/3/2018 at [facility] showed:  INTERPRETATION: Lordotic alignment of 5 nonrib-bearing lumbar-type vertebral bodies without spondylolysis, vertebral collapse, acute fracture, or destructive osseous lesion. Normal pre and paravertebral soft tissues. Subcentimeter benign T2 hyperintense left renal cysts.  Conus medullaris positioned at L1-2, with normal configuration of the terminal nerve roots.   L5-S1: Moderate disc degeneration and right paracentral to proximal foraminal annular tear/7 mm AP extruded disc herniation impinging and displacing the descending right S1 nerve root, and slightly abutting the ventromedial margin of the left S1 root. Normal facet morphology and mild proximal right foraminal stenosis. Patent left foramen.   L4-5 and L3-4: Mild disc degeneration/ventral spondylosis with normal dorsal disc contours, normal facet morphology, and no stenosis or impingement.   L2-3 through T12 1: Normal dorsal disc contours and normal facet morphology.  CONCLUSION:   1. L5-S1 extruded disc herniation impinging and displacing the right S1 root, and slightly abutting the left S1 root without bryanna impingement.  2. L5-S1 mild proximal right foraminal stenosis.  3. No spondylolysis, acute fracture, or destructive osseous lesion.    Review of Systems:    POSTIVE IN BOLD  GENERAL: fever/chills, fatigue, general unwell feeling, weight gain/loss.  HEAD/EYES:  headache, dizziness, or vision changes.    EARS/NOSE/THROAT:  Nosebleeds, hearing loss, sinus infection, earache, tinnitus.  IMMUNE:  Allergies, cancer, immune deficiency, or infections.  SKIN:  Urticaria, rash, hives  HEME/Lymphatic:   anemia, easy bruising, easy bleeding.  RESPIRATORY:  cough, wheezing, or shortness of breath  CARDIOVASCULAR/Circulation:  Extremity edema, syncope, hypertension, tachycardia, or angina.  GASTROINTESTINAL:  abdominal pain, nausea/emesis, diarrhea, constipation,  hematochezia, or melena.  ENDOCRINE:  Diabetes,  steroid use,  thyroid disease or osteoporosis.  MUSCULOSKELETAL: neck pain, back pain, arthralgia, arthritis, or gout.  GENITOURINARY:  frequency, urgency, dysuria, difficulty voiding, hematuria or incontinence.  NEUROLOGIC:  weakness, numbness, paresthesias, seizure, tremor, stroke or memory loss.  PSYCHIATRIC:  depression, anxiety, stress, suicidal thoughts or mood swings.     Physical Exam:  Vitals:    04/17/19 0912 04/17/19 0916   BP: (!) 154/93 (!) 139/95   Pulse: 76    SpO2: 97%    Weight: (!) 169.6 kg (374 lb)      Exam:  Constitutional: healthy, alert, no distress, obese  Head: normocephalic. Atraumatic.   Eyes: no redness or jaundice noted   ENT: oropharnx normal.  MMM.  Neck supple.    Respiratory: breathing unlabored on room air  Gastrointestinal: soft, non-tender.  : deferred  Skin: no suspicious lesions or rashes  Psychiatric: mentation appears normal and affect normal/bright    Musculoskeletal exam:  Gait/Station/Posture: Normal posture and gait. Can walk on heels and toes.   Thoracic spine:  Normal  Lumbar spine: ROM mildly restricted in flexion. Normal in extension and lateral rotation bilaterally. Pain/tightness with all ROM.   Myofascial tenderness:  Tender to palpation in lumbar paraspinals bilaterally and QL. No significant tenderness in gluteal musculature.   Straight leg exam: Negative bilaterally    Bilateral hip motion without discomfort.    Neurologic exam:  Motor:  5/5 LE strength    Reflexes:     Patella:  R:  2/4 L: 2/4   Achilles:  R:  2/4 L: 2/4   Sensory:  ( lower extremities):   Light touch: normal    Allodynia: absent    Hyperalgesia: absent     Assessment:  Jonny Quinones is a 44 year old male with a past medical history significant for sleep apnea, morbid obesity, HTN, factor V Leiden, anxiety  who presents with complaints of low back pain.     1. Low back pain: Pain has been ongoing for about 2 years. Initially, had more radicular pain into the right leg but now leg symptoms  are minimal. What is most bothersome now is bilateral low back pain, stiffness and tightness. No red flag symptoms. Exam shows limitations in lumbar ROM due to pain and tightness. Neuro exam is normal. Etiology is likely multifactorial due to deconditioning, lumbar spondylosis with degenerative disc disease at L5-S1 and overlying myofascial pain.      Plan:  Diagnosis reviewed, treatment options addressed, and risks/benefits discussed. The patient was involved in shared decision making regarding the plan as laid out below.    1. Education: The patient was educated as to the natural history of their disorder. Reassurance was given and the patient was encouraged to engage in activity and movement as able.  2. Physical Therapy:   PT to evaluate and treat for low back pain. He has not done PT previously. PT will assist with pacing, coping strategies, stretching, and strengthening as appropriate. Discussed the importance of regular exercise and establishing a good HEP. He is unsure where he wants to do PT so he will call the clinic when he decides and I can place the order at that time.   3. Diagnostic Studies:  No new imaging at this time.   4. Medication Management:    1. Start cyclobenzaprine 10 mg at bedtime.   2. Start Naproxen 500 mg q am prn. Discussed that this should not be used in conjunction with ibuprofen. He will stop using ibuprofen during the day. He can still take Advil PM at night.   5. Further procedures recommended: None  6. Complementary Treatments: Can consider addition of chiropractic in the future.   7. Other: Discussed importance of weight loss and how that can help decrease chronic low back pain.   8. Follow up: 10-12 weeks in clinic.   9. Jonny to follow up with Primary Care provider regarding elevated blood pressure.    Total time spent face to face was 60 minutes and more than 50% of face to face time was spent in counseling and/or coordination of care regarding the diagnosis and  recommendations above.    Vale Cifuentes MD  Medical Spine Specialist  [facility]

## 2019-04-17 ENCOUNTER — OFFICE VISIT (OUTPATIENT)
Dept: PALLIATIVE MEDICINE | Facility: CLINIC | Age: 44
End: 2019-04-17
Payer: COMMERCIAL

## 2019-04-17 VITALS
BODY MASS INDEX: 47.38 KG/M2 | SYSTOLIC BLOOD PRESSURE: 139 MMHG | DIASTOLIC BLOOD PRESSURE: 95 MMHG | WEIGHT: 315 LBS | OXYGEN SATURATION: 97 % | HEART RATE: 76 BPM

## 2019-04-17 DIAGNOSIS — M79.18 MYOFASCIAL PAIN: ICD-10-CM

## 2019-04-17 DIAGNOSIS — M47.816 SPONDYLOSIS OF LUMBAR REGION WITHOUT MYELOPATHY OR RADICULOPATHY: Primary | ICD-10-CM

## 2019-04-17 PROCEDURE — 99244 OFF/OP CNSLTJ NEW/EST MOD 40: CPT | Performed by: PHYSICAL MEDICINE & REHABILITATION

## 2019-04-17 RX ORDER — NAPROXEN 500 MG/1
500 TABLET ORAL DAILY PRN
Qty: 60 TABLET | Refills: 0 | Status: SHIPPED | OUTPATIENT
Start: 2019-04-17 | End: 2022-01-28

## 2019-04-17 RX ORDER — CYCLOBENZAPRINE HCL 10 MG
10 TABLET ORAL
Qty: 30 TABLET | Refills: 1 | Status: SHIPPED | OUTPATIENT
Start: 2019-04-17 | End: 2019-11-19

## 2019-04-17 ASSESSMENT — PAIN SCALES - GENERAL: PAINLEVEL: MODERATE PAIN (5)

## 2019-04-17 NOTE — PATIENT INSTRUCTIONS
Thank you for coming to [facility] for your care. It is my goal to partner with you to help you reach your optimal state of health.     You were seen today for your back pain. As discussed during your visit these are the recommendations:    1. Medications:   - Start flexeril 10 mg at night. This can cause sedation so do not drive after taking this medication for the first time.   - Start Naproxen 500 mg. You can take this once in the morning. Do not take any additional ibuprofen during the day.   - You can continue taking Advil PM at night. You can take this with the muscle relaxant.   2. Therapies: An order was place to start PT. You can check to see where you want to have this done. Call our clinic when you know if you want to use [facility] or another location and I can put in the order.  3. Procedures:  None at this time  4. Imaging/Diagnostic Studies: No new imaging needed.  5. Other: We can discuss adding in chiropractic later on. Weight loss can be helpful in general for low back pain.    6. Follow up: 10-12 weeks in clinic        ----------------------------------------------------------------  Clinic Number:  [phone]   Call this number with any questions about your care and for scheduling assistance. Calls are returned Monday through Friday between 8 AM and 4:30 PM. We usually get back to you within 2 business days depending on the issue/request.       Medication refills:    For non-opioid medications, call your pharmacy directly to request a refill. The pharmacy will contact the [facility] for authorization. Please allow 3-4 days for these refills to be processed.     For opioid refills, call the clinic number or send a [employer] message. Please contact us 7-10 days before your refill is due. The message MUST include the name of the specific medication(s) requested and how you would like to receive the prescription(s). The options are as follows:    Pain Clinic staff can  mail the prescription to your pharmacy. Please tell us the name of the pharmacy.    You may pick the prescription up at the Pain Clinic (tell us the location) or during a clinic visit with your pain provider    Pain Clinic staff can deliver the prescription to the [facility] pharmacy in the clinic building. Please tell us the location.      We believe regular attendance is key to your success in our program.    Any time you are unable to keep your appointment we ask that you call us at least 24 hours in advance to let us know. This will allow us to offer the appointment time to another patient.

## 2019-06-27 ENCOUNTER — MYC REFILL (OUTPATIENT)
Dept: FAMILY MEDICINE | Facility: CLINIC | Age: 44
End: 2019-06-27

## 2019-06-27 DIAGNOSIS — I10 ESSENTIAL HYPERTENSION: ICD-10-CM

## 2019-06-28 DIAGNOSIS — I10 ESSENTIAL HYPERTENSION: ICD-10-CM

## 2019-06-28 RX ORDER — OLMESARTAN MEDOXOMIL 40 MG/1
TABLET ORAL
Qty: 15 TABLET | Refills: 0 | Status: SHIPPED | OUTPATIENT
Start: 2019-06-28 | End: 2019-07-04

## 2019-06-28 NOTE — TELEPHONE ENCOUNTER
Pt's wife returned called. Scheduled lab for 7/1 Monday.    She wanted to ask questions about the prescription.  She said originally MARCELINA had given a 90 day supply and then [facility] had a limited supply and due to that they were only able to get 30 at a time.  She wanted to see about going back to the 90 day supply but she said [facility] said they would have to send in a new prescription for it.  I wasn't' really sure how to advise on this.  Please call pt back when available to discuss.    [phone]  Can leave a detailed message.

## 2019-06-28 NOTE — TELEPHONE ENCOUNTER
"Requested Prescriptions   Pending Prescriptions Disp Refills     olmesartan (BENICAR) 40 MG tablet [Pharmacy Med Name: OLMESARTAN 40MG TAB]  Last Written Prescription Date:  4/1/19  Last Fill Quantity: 90,  # refills: 0   Last office visit: 4/1/2019 with prescribing provider:  Nessa Braden PA-C   Future Office Visit:   Next 5 appointments (look out 90 days)    Aug 26, 2019  8:10 AM CDT  SHORT with Nessa Braden PA-C  [facility] [address] [facility] [address] [phone]          90 tablet 0     Sig: TAKE 1 TABLET BY MOUTH ONCE DAILY       Angiotensin-II Receptors Failed - 6/28/2019  8:53 AM        Failed - Blood pressure under 140/90 in past 12 months     BP Readings from Last 3 Encounters:   04/17/19 (!) 139/95   04/01/19 130/86   02/05/19 (!) 166/112                 Failed - Normal serum potassium on file in past 12 months     Recent Labs   Lab Test 04/01/19  0839   POTASSIUM 3.1*                    Passed - Recent (12 mo) or future (30 days) visit within the authorizing provider's specialty     Patient had office visit in the last 12 months or has a visit in the next 30 days with authorizing provider or within the authorizing provider's specialty.  See \"Patient Info\" tab in inbasket, or \"Choose Columns\" in Meds & Orders section of the refill encounter.              Passed - Medication is active on med list        Passed - Patient is age 18 or older        Passed - Normal serum creatinine on file in past 12 months     Recent Labs   Lab Test 04/01/19  0839   CR 0.93               "

## 2019-06-28 NOTE — TELEPHONE ENCOUNTER
Routing refill request to provider for review/approval because:  Labs out of range:  BP, Potassium  Yenifer Awan RN

## 2019-06-28 NOTE — TELEPHONE ENCOUNTER
Has OV with MARCELINA on 8/26/19 for BP, needs non-fasting lab only this month to recheck potassium rechecked.    1st attempt, LM for patient to call back.    Elizabeth Mackey CMA ([facility])

## 2019-07-02 RX ORDER — OLMESARTAN MEDOXOMIL 40 MG/1
40 TABLET ORAL DAILY
Qty: 90 TABLET | Refills: 0 | Status: CANCELLED | OUTPATIENT
Start: 2019-07-02

## 2019-07-02 NOTE — TELEPHONE ENCOUNTER
Benicar - this was filled 6/28/19.  See refill of 6/28/19.  Pt due for lab work.  Called earlier and left a message to call back.

## 2019-07-02 NOTE — TELEPHONE ENCOUNTER
No consent to communicate with wife.      Pt needs to have non fasting labs drawn.  Potassium low at last draw and bp elevated.  Unable to give larger refills until potassium redrawn and in range and bp is currently elevated.  Future lab is in place.      Called and left a message for the pt to call us back.    Please help him schedule an appt for a lab only.  Thanks!, does not need to fast.

## 2019-07-04 ENCOUNTER — MYC REFILL (OUTPATIENT)
Dept: FAMILY MEDICINE | Facility: CLINIC | Age: 44
End: 2019-07-04

## 2019-07-04 DIAGNOSIS — I10 ESSENTIAL HYPERTENSION: ICD-10-CM

## 2019-07-08 DIAGNOSIS — E87.6 LOW BLOOD POTASSIUM: ICD-10-CM

## 2019-07-08 LAB
ANION GAP SERPL CALCULATED.3IONS-SCNC: 10 MMOL/L (ref 3–14)
BUN SERPL-MCNC: 17 MG/DL (ref 7–30)
CALCIUM SERPL-MCNC: 8.6 MG/DL (ref 8.5–10.1)
CHLORIDE SERPL-SCNC: 106 MMOL/L (ref 94–109)
CO2 SERPL-SCNC: 26 MMOL/L (ref 20–32)
CREAT SERPL-MCNC: 0.95 MG/DL (ref 0.66–1.25)
GFR SERPL CREATININE-BSD FRML MDRD: >90 ML/MIN/{1.73_M2}
GLUCOSE SERPL-MCNC: 106 MG/DL (ref 70–99)
POTASSIUM SERPL-SCNC: 3.3 MMOL/L (ref 3.4–5.3)
SODIUM SERPL-SCNC: 142 MMOL/L (ref 133–144)

## 2019-07-08 PROCEDURE — 36415 COLL VENOUS BLD VENIPUNCTURE: CPT | Performed by: PHYSICIAN ASSISTANT

## 2019-07-08 PROCEDURE — 80048 BASIC METABOLIC PNL TOTAL CA: CPT | Performed by: PHYSICIAN ASSISTANT

## 2019-07-08 NOTE — TELEPHONE ENCOUNTER
"Benicar 40 mg    Last Written Prescription Date:  6/28/19  Last Fill Quantity: 15,  # refills: 0   Last office visit: 4/1/2019 with prescribing provider:  MARCELINA   Future Office Visit:   Next 5 appointments (look out 90 days)    Aug 26, 2019  8:10 AM CDT  SHORT with Nessa Braden PA-C  [facility] ([facility] [address] [facility] [address] [phone]         Requested Prescriptions   Pending Prescriptions Disp Refills     olmesartan (BENICAR) 40 MG tablet 15 tablet 0     Sig: Take 1 tablet (40 mg) by mouth daily       Angiotensin-II Receptors Failed - 7/4/2019 10:50 AM        Failed - Blood pressure under 140/90 in past 12 months     BP Readings from Last 3 Encounters:   04/17/19 (!) 139/95   04/01/19 130/86   02/05/19 (!) 166/112                 Failed - Normal serum potassium on file in past 12 months     Recent Labs   Lab Test 07/08/19  0922   POTASSIUM 3.3*                    Passed - Recent (12 mo) or future (30 days) visit within the authorizing provider's specialty     Patient had office visit in the last 12 months or has a visit in the next 30 days with authorizing provider or within the authorizing provider's specialty.  See \"Patient Info\" tab in inbasket, or \"Choose Columns\" in Meds & Orders section of the refill encounter.              Passed - Medication is active on med list        Passed - Patient is age 18 or older        Passed - Normal serum creatinine on file in past 12 months     Recent Labs   Lab Test 07/08/19  0922   CR 0.95             Routing refill request to provider for review/approval because:  Protocol failed- Labs and BP.     Gita SAHNI RN         "

## 2019-07-09 RX ORDER — OLMESARTAN MEDOXOMIL 40 MG/1
40 TABLET ORAL DAILY
Qty: 15 TABLET | Refills: 0 | Status: SHIPPED | OUTPATIENT
Start: 2019-07-09 | End: 2019-07-12

## 2019-07-12 ENCOUNTER — MYC REFILL (OUTPATIENT)
Dept: FAMILY MEDICINE | Facility: CLINIC | Age: 44
End: 2019-07-12

## 2019-07-12 DIAGNOSIS — I10 ESSENTIAL HYPERTENSION: ICD-10-CM

## 2019-07-16 RX ORDER — OLMESARTAN MEDOXOMIL 40 MG/1
40 TABLET ORAL DAILY
Qty: 30 TABLET | Refills: 0 | Status: SHIPPED | OUTPATIENT
Start: 2019-07-16 | End: 2019-08-29

## 2019-07-16 NOTE — TELEPHONE ENCOUNTER
"Benicar 40mg   Last Written Prescription Date:  7/9/19  Last Fill Quantity: 15,  # refills: 0   Last office visit: 4/1/2019 with prescribing provider:  MARCELINA   Future Office Visit:   Next 5 appointments (look out 90 days)    Aug 26, 2019  8:10 AM CDT  SHORT with Nessa Braden PA-C  [facility] [address] [facility] [address] [phone]         Requested Prescriptions   Pending Prescriptions Disp Refills     olmesartan (BENICAR) 40 MG tablet 15 tablet 0     Sig: Take 1 tablet (40 mg) by mouth daily       Angiotensin-II Receptors Failed - 7/15/2019  3:38 PM        Failed - Blood pressure under 140/90 in past 12 months     BP Readings from Last 3 Encounters:   04/17/19 (!) 139/95   04/01/19 130/86   02/05/19 (!) 166/112                 Failed - Normal serum potassium on file in past 12 months     Recent Labs   Lab Test 07/08/19  0922   POTASSIUM 3.3*                    Passed - Recent (12 mo) or future (30 days) visit within the authorizing provider's specialty     Patient had office visit in the last 12 months or has a visit in the next 30 days with authorizing provider or within the authorizing provider's specialty.  See \"Patient Info\" tab in inbasket, or \"Choose Columns\" in Meds & Orders section of the refill encounter.              Passed - Medication is active on med list        Passed - Patient is age 18 or older        Passed - Normal serum creatinine on file in past 12 months     Recent Labs   Lab Test 07/08/19  0922   CR 0.95             Medication is being filled for 1 time refill only due to:  To get pt to scheduled appt on 8/26/19      "

## 2019-08-13 ENCOUNTER — MYC REFILL (OUTPATIENT)
Dept: FAMILY MEDICINE | Facility: CLINIC | Age: 44
End: 2019-08-13

## 2019-08-13 ENCOUNTER — MYC MEDICAL ADVICE (OUTPATIENT)
Dept: FAMILY MEDICINE | Facility: CLINIC | Age: 44
End: 2019-08-13

## 2019-08-13 DIAGNOSIS — G47.30 SLEEP APNEA, UNSPECIFIED TYPE: Primary | ICD-10-CM

## 2019-08-13 DIAGNOSIS — I10 ESSENTIAL HYPERTENSION: ICD-10-CM

## 2019-08-13 DIAGNOSIS — E55.9 VITAMIN D DEFICIENCY: ICD-10-CM

## 2019-08-13 RX ORDER — ACETAMINOPHEN 160 MG
4000 TABLET,DISINTEGRATING ORAL DAILY
Qty: 180 CAPSULE | Refills: 1 | Status: CANCELLED | OUTPATIENT
Start: 2019-08-13

## 2019-08-13 NOTE — TELEPHONE ENCOUNTER
Should see sleep specialist, referral placed, please give information for patient to schedule.    Nessa Braden PA-C

## 2019-08-13 NOTE — TELEPHONE ENCOUNTER
"Requested Prescriptions   Pending Prescriptions Disp Refills     metoprolol succinate ER (TOPROL-XL) 50 MG 24 hr tablet [Pharmacy Med Name: METOPROLOL SUCCINATE ER 50MG TB24] 90 tablet 0     Sig: TAKE ONE TABLET BY MOUTH ONCE DAILY   Last Written Prescription Date:  4/1/19  Last Fill Quantity: 90,  # refills: 0   Last office visit: 4/1/2019 with prescribing provider:  Nessa Braden PA-C   Future Office Visit:   Next 5 appointments (look out 90 days)    Aug 26, 2019  8:10 AM CDT  [facility]-C  [facility] [address] [facility] [address]  [phone]             Beta-Blockers Protocol Failed - 8/13/2019  9:28 AM        Failed - Blood pressure under 140/90 in past 12 months     BP Readings from Last 3 Encounters:   04/17/19 (!) 139/95   04/01/19 130/86   02/05/19 (!) 166/112                 Passed - Patient is age 6 or older        Passed - Recent (12 mo) or future (30 days) visit within the authorizing provider's specialty     Patient had office visit in the last 12 months or has a visit in the next 30 days with authorizing provider or within the authorizing provider's specialty.  See \"Patient Info\" tab in inbasket, or \"Choose Columns\" in Meds & Orders section of the refill encounter.              Passed - Medication is active on med list          "

## 2019-08-14 RX ORDER — METOPROLOL SUCCINATE 50 MG/1
TABLET, EXTENDED RELEASE ORAL
Qty: 90 TABLET | Refills: 0 | Status: SHIPPED | OUTPATIENT
Start: 2019-08-14 | End: 2019-10-23

## 2019-08-14 NOTE — TELEPHONE ENCOUNTER
Prescription approved per [facility] Refill Protocol.    Mumtaz Mckeon RN, BSN, PHN

## 2019-09-05 ENCOUNTER — OFFICE VISIT (OUTPATIENT)
Dept: FAMILY MEDICINE | Facility: CLINIC | Age: 44
End: 2019-09-05
Payer: COMMERCIAL

## 2019-09-05 VITALS
DIASTOLIC BLOOD PRESSURE: 100 MMHG | RESPIRATION RATE: 16 BRPM | WEIGHT: 315 LBS | BODY MASS INDEX: 48.21 KG/M2 | SYSTOLIC BLOOD PRESSURE: 148 MMHG | OXYGEN SATURATION: 96 % | TEMPERATURE: 98 F | HEART RATE: 67 BPM

## 2019-09-05 DIAGNOSIS — I10 ESSENTIAL HYPERTENSION: ICD-10-CM

## 2019-09-05 DIAGNOSIS — E87.6 HYPOKALEMIA: ICD-10-CM

## 2019-09-05 DIAGNOSIS — Z23 NEED FOR PROPHYLACTIC VACCINATION AND INOCULATION AGAINST INFLUENZA: Primary | ICD-10-CM

## 2019-09-05 LAB
ANION GAP SERPL CALCULATED.3IONS-SCNC: 4 MMOL/L (ref 3–14)
BUN SERPL-MCNC: 13 MG/DL (ref 7–30)
CALCIUM SERPL-MCNC: 8.7 MG/DL (ref 8.5–10.1)
CHLORIDE SERPL-SCNC: 104 MMOL/L (ref 94–109)
CO2 SERPL-SCNC: 31 MMOL/L (ref 20–32)
CREAT SERPL-MCNC: 0.84 MG/DL (ref 0.66–1.25)
GFR SERPL CREATININE-BSD FRML MDRD: >90 ML/MIN/{1.73_M2}
GLUCOSE SERPL-MCNC: 111 MG/DL (ref 70–99)
POTASSIUM SERPL-SCNC: 3.1 MMOL/L (ref 3.4–5.3)
SODIUM SERPL-SCNC: 139 MMOL/L (ref 133–144)

## 2019-09-05 PROCEDURE — 90471 IMMUNIZATION ADMIN: CPT | Performed by: NURSE PRACTITIONER

## 2019-09-05 PROCEDURE — 90686 IIV4 VACC NO PRSV 0.5 ML IM: CPT | Performed by: NURSE PRACTITIONER

## 2019-09-05 PROCEDURE — 99213 OFFICE O/P EST LOW 20 MIN: CPT | Mod: 25 | Performed by: NURSE PRACTITIONER

## 2019-09-05 PROCEDURE — 36415 COLL VENOUS BLD VENIPUNCTURE: CPT | Performed by: NURSE PRACTITIONER

## 2019-09-05 PROCEDURE — 80048 BASIC METABOLIC PNL TOTAL CA: CPT | Performed by: NURSE PRACTITIONER

## 2019-09-05 RX ORDER — OLMESARTAN MEDOXOMIL 40 MG/1
40 TABLET ORAL DAILY
Qty: 90 TABLET | Refills: 1 | Status: SHIPPED | OUTPATIENT
Start: 2019-09-05 | End: 2019-11-19

## 2019-09-05 NOTE — PROGRESS NOTES
Subjective     Jonny Quinones is a 44 year old male who presents to clinic today for the following health issues:    HPI   Medication Followup of olmesartan     Taking Medication as prescribed: yes    Side Effects:  None    Medication Helping Symptoms:  yes     Pt presents with requests for bp med refill.  He is compliant with meds.  There was a supply shortage with the olmesartan and this caused issues with his refills.  He did run out last week, was out for 1 week.  Restarted 2 days ago.  He tolerates his meds well.  No chest pain, sob.  He has continued to gain weight.  He has been working with [facility] intermittently for back issues.  Surgery sounds like the next step.  He is unable to do any physical activity outside of work due to the pain.  Notes his bp runs higher intermittently.    Patient Active Problem List   Diagnosis     Prediabetes     Lipid screening     Gout, unspecified     Essential hypertension     Sleep apnea     Vitamin D deficiency     Anxiety state     Allergic rhinitis     Abscess of lung (H)     Morbid obesity (H)     Factor V Leiden (H)     Lumbar herniated disc     CME (cystoid macular edema), right     Chronic low back pain without sciatica, unspecified back pain laterality     Past Surgical History:   Procedure Laterality Date     athroereisis  03/27/2009    [place] [facility]     ENT SURGERY Bilateral 1985    tonsilectomy     LAPAROSCOPIC GASTRIC ADJUSTABLE BANDING  11/2009     SINUS SURGERY  2001     sphincterotomy  02/2009    lateral internal       Social History     Tobacco Use     Smoking status: Never Smoker     Smokeless tobacco: Never Used   Substance Use Topics     Alcohol use: No     Frequency: Never     Family History   Problem Relation Age of Onset     Allergies Mother      Blood Disease Mother         Leiden Factor 5     Cerebrovascular Disease Mother      Hypertension Father      Hypertension Maternal Grandmother      Peripheral Vascular Disease Maternal Grandmother       Allergies Brother      Breast Cancer Paternal Aunt      Thrombosis Paternal Grandfather              Reviewed and updated as needed this visit by Provider  Tobacco  Allergies  Meds  Problems  Med Hx  Surg Hx  Fam Hx         Review of Systems   SEE HPI.      Objective    BP (!) 148/100 (BP Location: Right arm, Patient Position: Chair, Cuff Size: Adult Large)   Pulse 67   Temp 98  F (36.7  C) (Tympanic)   Resp 16   Wt (!) 172.6 kg (380 lb 9.6 oz)   SpO2 96%   BMI 48.21 kg/m    Body mass index is 48.21 kg/m .  Physical Exam   GENERAL: healthy, alert and no distress  RESP: lungs clear to auscultation - no rales, rhonchi or wheezes  CV: regular rate and rhythm, normal S1 S2, no S3 or S4, no murmur, click or rub, no peripheral edema and peripheral pulses strong  PSYCH: mentation appears normal, affect normal/bright    Diagnostic Test Results:  Labs reviewed in Epic        Assessment & Plan     1. Essential hypertension  Discussed options.  Refill olmesartan.  I recommended he see cardiology for their input on other options to help control BP.  We discussed weight and activity for quite some time.  He is eager to proceed with surgery now as he feels the negative effects his pain is having on the areas of his life.  Encouraged him to talk more with his surgeon regarding the options moving forward as his bp would likely improve greatly with activity and weight loss.  Pt agrees with plan and verbalized understanding.  - Basic metabolic panel  - olmesartan (BENICAR) 40 MG tablet; Take 1 tablet (40 mg) by mouth daily  Dispense: 90 tablet; Refill: 1  - CARDIOLOGY EVAL ADULT REFERRAL    2. Hypokalemia  Noted on previous bmp.  Repeat today.    3. Need for prophylactic vaccination and inoculation against influenza  - HC FLU VAC PRESRV FREE QUAD SPLIT VIR > 6 MONTHS IM [14319]  - Vaccine Administration, Initial [61538]     BMI:   Estimated body mass index is 48.21 kg/m  as calculated from the following:    Height as of  "4/1/19: 1.892 m (6' 2.5\").    Weight as of this encounter: 172.6 kg (380 lb 9.6 oz).     Return in about 6 months (around 3/5/2020) for follow up.    [facility]      "

## 2019-09-05 NOTE — PATIENT INSTRUCTIONS
Schedule with cardiology.    Schedule with [facility] as planned, if needed I can place a referral to a new surgery group.

## 2019-10-23 ENCOUNTER — MYC REFILL (OUTPATIENT)
Dept: FAMILY MEDICINE | Facility: CLINIC | Age: 44
End: 2019-10-23

## 2019-10-23 ENCOUNTER — OFFICE VISIT (OUTPATIENT)
Dept: CARDIOLOGY | Facility: CLINIC | Age: 44
End: 2019-10-23
Attending: NURSE PRACTITIONER
Payer: COMMERCIAL

## 2019-10-23 VITALS
BODY MASS INDEX: 39.17 KG/M2 | DIASTOLIC BLOOD PRESSURE: 90 MMHG | OXYGEN SATURATION: 97 % | HEART RATE: 74 BPM | SYSTOLIC BLOOD PRESSURE: 136 MMHG | WEIGHT: 315 LBS | HEIGHT: 75 IN

## 2019-10-23 DIAGNOSIS — I10 ESSENTIAL HYPERTENSION: ICD-10-CM

## 2019-10-23 DIAGNOSIS — E55.9 VITAMIN D DEFICIENCY: ICD-10-CM

## 2019-10-23 DIAGNOSIS — I10 ESSENTIAL HYPERTENSION: Primary | ICD-10-CM

## 2019-10-23 DIAGNOSIS — F41.1 ANXIETY STATE: ICD-10-CM

## 2019-10-23 PROCEDURE — 99204 OFFICE O/P NEW MOD 45 MIN: CPT | Performed by: INTERNAL MEDICINE

## 2019-10-23 RX ORDER — OLMESARTAN MEDOXOMIL 40 MG/1
40 TABLET ORAL DAILY
Qty: 90 TABLET | Refills: 1 | Status: CANCELLED | OUTPATIENT
Start: 2019-10-23

## 2019-10-23 RX ORDER — CARVEDILOL PHOSPHATE 80 MG/1
80 CAPSULE, EXTENDED RELEASE ORAL DAILY
Qty: 30 CAPSULE | Refills: 11 | Status: SHIPPED | OUTPATIENT
Start: 2019-10-23 | End: 2019-12-23

## 2019-10-23 ASSESSMENT — MIFFLIN-ST. JEOR: SCORE: 2649.15

## 2019-10-23 NOTE — PROGRESS NOTES
HISTORY:    Jonny Quinones is a pleasant 44-year-old male accompanied by his wife today.  He was asked to see cardiology for assistance in management of hypertension.    Jonny has had hypertension for at least 20 years, always difficult to control.  His current medications include amlodipine 10 mg daily, Toprol- mg daily, hydrochlorothiazide 25 mg daily, and Benicar 40 mg daily.  He does not check home blood pressure in his office blood pressure today is 136/90, mildly elevated.    Jonny has a history of obstructive sleep apnea and uses his CPAP machine each night.  He is morbidly obese and works as a , getting very little exercise.  He reports that he does not use alcohol significantly and does not smoke, but he does tend to eat a fairly high sodium diet.  He does not add salt but much of the food he eats on a regular basis is processed and high salt.    There is a strong family history of hypertension involving both of his parents.  Jonny's cholesterol is acceptable and is mentioned he is not a smoker.  He is also not diabetic.      ASSESSMENT/PLAN:    1.  Essential hypertension.  The patient is on multiple medications at the current time.  I offered to put him on carvedilol twice daily but he is not interested and thinks he would forget his second dose.  I have switched him over to long-acting carvedilol once daily but it may be too expensive for him.  I also talked to him about the possibility of switching over to spironolactone which has better blood pressure lowering properties, but there is a risk of gynecomastia and he is not willing to take that risk.  He would like to avoid any medications that he has to take more than once a day.  That gives us very little opportunity for improving his blood pressure control.  The next thing I would consider doing is switching him off of his hydrochlorothiazide and either using chlorthalidone or eplerenone.  Meanwhile, I will arrange a renal ultrasound  to make sure he does not have renal artery    Thank you for inviting me to participate in your patient's care.  3-month follow-up will be planned.    Orders Placed This Encounter   Procedures     US Renal Complete w Doppler Complete     Follow-Up with Cardiologist     Orders Placed This Encounter   Medications     carvedilol ER (COREG CR) 80 MG 24 hr capsule     Sig: Take 1 capsule (80 mg) by mouth daily     Dispense:  30 capsule     Refill:  11     Medications Discontinued During This Encounter   Medication Reason     metoprolol succinate ER (TOPROL-XL) 100 MG 24 hr tablet      metoprolol succinate ER (TOPROL-XL) 50 MG 24 hr tablet        10 year ASCVD risk: The 10-year ASCVD risk score (Arturo BARTON JrKrystle, et al., 2013) is: 3.7%    Values used to calculate the score:      Age: 44 years      Sex: Male      Is Non- : No      Diabetic: No      Tobacco smoker: No      Systolic Blood Pressure: 136 mmHg      Is BP treated: Yes      HDL Cholesterol: 41 mg/dL      Total Cholesterol: 199 mg/dL    Encounter Diagnosis   Name Primary?     Essential hypertension Yes       CURRENT MEDICATIONS:  Current Outpatient Medications   Medication Sig Dispense Refill     amLODIPine (NORVASC) 10 MG tablet Take 1 tablet (10 mg) by mouth daily 90 tablet 1     carvedilol ER (COREG CR) 80 MG 24 hr capsule Take 1 capsule (80 mg) by mouth daily 30 capsule 11     Cholecalciferol (VITAMIN D3) 2000 units CAPS Take 4,000 Units by mouth daily 180 capsule 1     citalopram (CELEXA) 20 MG tablet Take 1 tablet (20 mg) by mouth daily 90 tablet 1     cyclobenzaprine (FLEXERIL) 10 MG tablet Take 1 tablet (10 mg) by mouth nightly as needed for muscle spasms 30 tablet 1     hydrochlorothiazide (HYDRODIURIL) 25 MG tablet Take 1 tablet (25 mg) by mouth daily 90 tablet 1     ibuprofen (ADVIL/MOTRIN) 200 MG tablet Take 200 mg by mouth every 4 hours as needed for mild pain       naproxen (NAPROSYN) 500 MG tablet Take 1 tablet (500 mg) by mouth  daily as needed for moderate pain 60 tablet 0     olmesartan (BENICAR) 40 MG tablet Take 1 tablet (40 mg) by mouth daily 15 tablet 0     UNABLE TO FIND MEDICATION NAME: CPAP machine       olmesartan (BENICAR) 40 MG tablet Take 1 tablet (40 mg) by mouth daily (Patient not taking: Reported on 10/23/2019) 90 tablet 1       ALLERGIES     Allergies   Allergen Reactions     Allopurinol GI Disturbance     Amoxicillin Other (See Comments)     headaches     Bupropion Other (See Comments)     Seasonal Allergies      Dust, mold, pollen       Venlafaxine Other (See Comments)       PAST MEDICAL HISTORY:  Past Medical History:   Diagnosis Date     Anxiety      Factor V Leiden (H)      Gout      Hypertension      Pre-diabetes      Sleep apnea      Vitamin D deficiency        PAST SURGICAL HISTORY:  Past Surgical History:   Procedure Laterality Date     athroereisis  03/27/2009    [place] Subtalar     ENT SURGERY Bilateral 1985    tonsilectomy     LAPAROSCOPIC GASTRIC ADJUSTABLE BANDING  11/2009     SINUS SURGERY  2001     sphincterotomy  02/2009    lateral internal       FAMILY HISTORY:  Family History   Problem Relation Age of Onset     Allergies Mother      Blood Disease Mother         Leiden Factor 5     Cerebrovascular Disease Mother      Hypertension Father      Hypertension Maternal Grandmother      Peripheral Vascular Disease Maternal Grandmother      Allergies Brother      Breast Cancer Paternal Aunt      Thrombosis Paternal Grandfather        SOCIAL HISTORY:  Social History     Socioeconomic History     Marital status:      Spouse name: None     Number of children: None     Years of education: None     Highest education level: None   Occupational History     None   Social Needs     Financial resource strain: None     Food insecurity:     Worry: None     Inability: None     Transportation needs:     Medical: None     Non-medical: None   Tobacco Use     Smoking status: Never Smoker     Smokeless tobacco: Never Used  "  Substance and Sexual Activity     Alcohol use: No     Frequency: Never     Drug use: No     Sexual activity: Yes     Partners: Female     Birth control/protection: I.U.D.   Lifestyle     Physical activity:     Days per week: None     Minutes per session: None     Stress: None   Relationships     Social connections:     Talks on phone: None     Gets together: None     Attends Restorationism service: None     Active member of club or organization: None     Attends meetings of clubs or organizations: None     Relationship status: None     Intimate partner violence:     Fear of current or ex partner: None     Emotionally abused: None     Physically abused: None     Forced sexual activity: None   Other Topics Concern     None   Social History Narrative     None       Review of Systems:  Skin:  Negative     Eyes:  Positive for    ENT:  Negative    Respiratory:  Negative    Cardiovascular:    Positive for  Gastroenterology: Negative    Genitourinary:  Negative    Musculoskeletal:  Positive for back pain  Neurologic:  Negative    Psychiatric:  Positive for depression;anxiety  Heme/Lymph/Imm:  Positive for bleeding disorder  Endocrine:  Negative      Physical Exam:  Vitals: BP (!) 136/90 (BP Location: Right arm, Patient Position: Chair, Cuff Size: Adult Large)   Pulse 74   Ht 1.892 m (6' 2.5\")   Wt (!) 169.1 kg (372 lb 14.4 oz)   SpO2 97%   BMI 47.24 kg/m      Constitutional:  cooperative, alert and oriented, well developed, well nourished, in no acute distress morbidly obese      Skin:  warm and dry to the touch        Head:  normocephalic        Eyes:  no xanthalasma        ENT:  no pallor or cyanosis        Neck:  carotid pulses are full and equal bilaterally, JVP normal, no carotid bruit        Chest:  normal breath sounds, clear to auscultation, normal A-P diameter, normal symmetry, normal respiratory excursion, no use of accessory muscles        Cardiac: regular rhythm, normal S1/S2, no S3 or S4, apical impulse not " displaced, no murmurs, gallops or rubs   distant heart sounds              Abdomen:  abdomen soft;BS normoactive obese      Vascular: pulses full and equal                                      Extremities and Back:           Neurological:  no gross motor deficits          Recent Lab Results:  LIPID RESULTS:  Lab Results   Component Value Date    CHOL 199 02/05/2019    HDL 41 02/05/2019     (H) 02/05/2019    TRIG 214 (H) 02/05/2019       LIVER ENZYME RESULTS:  Lab Results   Component Value Date    AST 22 02/05/2019    ALT 42 02/05/2019       CBC RESULTS:  Lab Results   Component Value Date    WBC 9.6 11/17/2009    RBC 5.81 11/17/2009    HGB 17.0 11/17/2009    HCT 50.3 11/17/2009    MCV 87 11/17/2009    MCH 29.3 11/17/2009    MCHC 33.8 11/17/2009    RDW 12.3 11/17/2009     11/17/2009       BMP RESULTS:  Lab Results   Component Value Date     09/05/2019    POTASSIUM 3.1 (L) 09/05/2019    CHLORIDE 104 09/05/2019    CO2 31 09/05/2019    ANIONGAP 4 09/05/2019     (H) 09/05/2019    BUN 13 09/05/2019    CR 0.84 09/05/2019    GFRESTIMATED >90 09/05/2019    GFRESTBLACK >90 09/05/2019    ESTHER 8.7 09/05/2019        A1C RESULTS:  Lab Results   Component Value Date    A1C 5.8 (H) 02/05/2019       INR RESULTS:  Lab Results   Component Value Date    INR 0.92 05/27/2009         Jarad Rey MD, FACC    CC  Erika Davison, APRN CNP  [address] [place] [address]

## 2019-10-23 NOTE — LETTER
10/23/2019    [facility]  [address]  [facility] [address]    RE: Jonny Stoutlenora       Dear Colleague,    I had the pleasure of seeing Jonny Quinones in the [facility].    HISTORY:    Jonny Quinones is a pleasant 44-year-old male accompanied by his wife today.  He was asked to see cardiology for assistance in management of hypertension.    Jonny has had hypertension for at least 20 years, always difficult to control.  His current medications include amlodipine 10 mg daily, Toprol- mg daily, hydrochlorothiazide 25 mg daily, and Benicar 40 mg daily.  He does not check home blood pressure in his office blood pressure today is 136/90, mildly elevated.    Jonny has a history of obstructive sleep apnea and uses his CPAP machine each night.  He is morbidly obese and works as a , getting very little exercise.  He reports that he does not use alcohol significantly and does not smoke, but he does tend to eat a fairly high sodium diet.  He does not add salt but much of the food he eats on a regular basis is processed and high salt.    There is a strong family history of hypertension involving both of his parents.  Jonny's cholesterol is acceptable and is mentioned he is not a smoker.  He is also not diabetic.      ASSESSMENT/PLAN:    1.  Essential hypertension.  The patient is on multiple medications at the current time.  I offered to put him on carvedilol twice daily but he is not interested and thinks he would forget his second dose.  I have switched him over to long-acting carvedilol once daily but it may be too expensive for him.  I also talked to him about the possibility of switching over to spironolactone which has better blood pressure lowering properties, but there is a risk of gynecomastia and he is not willing to take that risk.  He would like to avoid any medications that he has to take more than once a day.  That gives us very little  opportunity for improving his blood pressure control.  The next thing I would consider doing is switching him off of his hydrochlorothiazide and either using chlorthalidone or eplerenone.  Meanwhile, I will arrange a renal ultrasound to make sure he does not have renal artery    Thank you for inviting me to participate in your patient's care.  3-month follow-up will be planned.    Orders Placed This Encounter   Procedures     US Renal Complete w Doppler Complete     Follow-Up with Cardiologist     Orders Placed This Encounter   Medications     carvedilol ER (COREG CR) 80 MG 24 hr capsule     Sig: Take 1 capsule (80 mg) by mouth daily     Dispense:  30 capsule     Refill:  11     Medications Discontinued During This Encounter   Medication Reason     metoprolol succinate ER (TOPROL-XL) 100 MG 24 hr tablet      metoprolol succinate ER (TOPROL-XL) 50 MG 24 hr tablet        10 year ASCVD risk: The 10-year ASCVD risk score (Arturo BARTON Jr., et al., 2013) is: 3.7%    Values used to calculate the score:      Age: 44 years      Sex: Male      Is Non- : No      Diabetic: No      Tobacco smoker: No      Systolic Blood Pressure: 136 mmHg      Is BP treated: Yes      HDL Cholesterol: 41 mg/dL      Total Cholesterol: 199 mg/dL    Encounter Diagnosis   Name Primary?     Essential hypertension Yes       CURRENT MEDICATIONS:  Current Outpatient Medications   Medication Sig Dispense Refill     amLODIPine (NORVASC) 10 MG tablet Take 1 tablet (10 mg) by mouth daily 90 tablet 1     carvedilol ER (COREG CR) 80 MG 24 hr capsule Take 1 capsule (80 mg) by mouth daily 30 capsule 11     Cholecalciferol (VITAMIN D3) 2000 units CAPS Take 4,000 Units by mouth daily 180 capsule 1     citalopram (CELEXA) 20 MG tablet Take 1 tablet (20 mg) by mouth daily 90 tablet 1     cyclobenzaprine (FLEXERIL) 10 MG tablet Take 1 tablet (10 mg) by mouth nightly as needed for muscle spasms 30 tablet 1     hydrochlorothiazide (HYDRODIURIL) 25  MG tablet Take 1 tablet (25 mg) by mouth daily 90 tablet 1     ibuprofen (ADVIL/MOTRIN) 200 MG tablet Take 200 mg by mouth every 4 hours as needed for mild pain       naproxen (NAPROSYN) 500 MG tablet Take 1 tablet (500 mg) by mouth daily as needed for moderate pain 60 tablet 0     olmesartan (BENICAR) 40 MG tablet Take 1 tablet (40 mg) by mouth daily 15 tablet 0     UNABLE TO FIND MEDICATION NAME: CPAP machine       olmesartan (BENICAR) 40 MG tablet Take 1 tablet (40 mg) by mouth daily (Patient not taking: Reported on 10/23/2019) 90 tablet 1       ALLERGIES     Allergies   Allergen Reactions     Allopurinol GI Disturbance     Amoxicillin Other (See Comments)     headaches     Bupropion Other (See Comments)     Seasonal Allergies      Dust, mold, pollen       Venlafaxine Other (See Comments)       PAST MEDICAL HISTORY:  Past Medical History:   Diagnosis Date     Anxiety      Factor V Leiden (H)      Gout      Hypertension      Pre-diabetes      Sleep apnea      Vitamin D deficiency        PAST SURGICAL HISTORY:  Past Surgical History:   Procedure Laterality Date     athroereisis  03/27/2009    [place] Subtalar     ENT SURGERY Bilateral 1985    tonsilectomy     LAPAROSCOPIC GASTRIC ADJUSTABLE BANDING  11/2009     SINUS SURGERY  2001     sphincterotomy  02/2009    lateral internal       FAMILY HISTORY:  Family History   Problem Relation Age of Onset     Allergies Mother      Blood Disease Mother         Leiden Factor 5     Cerebrovascular Disease Mother      Hypertension Father      Hypertension Maternal Grandmother      Peripheral Vascular Disease Maternal Grandmother      Allergies Brother      Breast Cancer Paternal Aunt      Thrombosis Paternal Grandfather        SOCIAL HISTORY:  Social History     Socioeconomic History     Marital status:      Spouse name: None     Number of children: None     Years of education: None     Highest education level: None   Occupational History     None   Social Needs     Financial  "resource strain: None     Food insecurity:     Worry: None     Inability: None     Transportation needs:     Medical: None     Non-medical: None   Tobacco Use     Smoking status: Never Smoker     Smokeless tobacco: Never Used   Substance and Sexual Activity     Alcohol use: No     Frequency: Never     Drug use: No     Sexual activity: Yes     Partners: Female     Birth control/protection: I.U.D.   Lifestyle     Physical activity:     Days per week: None     Minutes per session: None     Stress: None   Relationships     Social connections:     Talks on phone: None     Gets together: None     Attends Amish service: None     Active member of club or organization: None     Attends meetings of clubs or organizations: None     Relationship status: None     Intimate partner violence:     Fear of current or ex partner: None     Emotionally abused: None     Physically abused: None     Forced sexual activity: None   Other Topics Concern     None   Social History Narrative     None       Review of Systems:  Skin:  Negative     Eyes:  Positive for    ENT:  Negative    Respiratory:  Negative    Cardiovascular:    Positive for  Gastroenterology: Negative    Genitourinary:  Negative    Musculoskeletal:  Positive for back pain  Neurologic:  Negative    Psychiatric:  Positive for depression;anxiety  Heme/Lymph/Imm:  Positive for bleeding disorder  Endocrine:  Negative      Physical Exam:  Vitals: BP (!) 136/90 (BP Location: Right arm, Patient Position: Chair, Cuff Size: Adult Large)   Pulse 74   Ht 1.892 m (6' 2.5\")   Wt (!) 169.1 kg (372 lb 14.4 oz)   SpO2 97%   BMI 47.24 kg/m       Constitutional:  cooperative, alert and oriented, well developed, well nourished, in no acute distress morbidly obese      Skin:  warm and dry to the touch        Head:  normocephalic        Eyes:  no xanthalasma        ENT:  no pallor or cyanosis        Neck:  carotid pulses are full and equal bilaterally, JVP normal, no carotid bruit    "     Chest:  normal breath sounds, clear to auscultation, normal A-P diameter, normal symmetry, normal respiratory excursion, no use of accessory muscles        Cardiac: regular rhythm, normal S1/S2, no S3 or S4, apical impulse not displaced, no murmurs, gallops or rubs   distant heart sounds              Abdomen:  abdomen soft;BS normoactive obese      Vascular: pulses full and equal                                      Extremities and Back:           Neurological:  no gross motor deficits          Recent Lab Results:  LIPID RESULTS:  Lab Results   Component Value Date    CHOL 199 02/05/2019    HDL 41 02/05/2019     (H) 02/05/2019    TRIG 214 (H) 02/05/2019       LIVER ENZYME RESULTS:  Lab Results   Component Value Date    AST 22 02/05/2019    ALT 42 02/05/2019       CBC RESULTS:  Lab Results   Component Value Date    WBC 9.6 11/17/2009    RBC 5.81 11/17/2009    HGB 17.0 11/17/2009    HCT 50.3 11/17/2009    MCV 87 11/17/2009    MCH 29.3 11/17/2009    MCHC 33.8 11/17/2009    RDW 12.3 11/17/2009     11/17/2009       BMP RESULTS:  Lab Results   Component Value Date     09/05/2019    POTASSIUM 3.1 (L) 09/05/2019    CHLORIDE 104 09/05/2019    CO2 31 09/05/2019    ANIONGAP 4 09/05/2019     (H) 09/05/2019    BUN 13 09/05/2019    CR 0.84 09/05/2019    GFRESTIMATED >90 09/05/2019    GFRESTBLACK >90 09/05/2019    ESTHER 8.7 09/05/2019        A1C RESULTS:  Lab Results   Component Value Date    A1C 5.8 (H) 02/05/2019       INR RESULTS:  Lab Results   Component Value Date    INR 0.92 05/27/2009         Thank you for allowing me to participate in the care of your patient.    Sincerely,     Jarad Rey MD     [facility]

## 2019-10-23 NOTE — LETTER
November 13, 2019      oJnny Quinones  [address] [facility] [address]        Dear Mr. Jonny Quinones,    We are contacting you today to notify you that you are due for a medication follow up for further refills. Please call [phone] to schedule an appointment.       Sincerely,     Nessa Braden PA-C  ashley

## 2019-10-28 RX ORDER — HYDROCHLOROTHIAZIDE 25 MG/1
25 TABLET ORAL DAILY
Qty: 30 TABLET | Refills: 0 | Status: SHIPPED | OUTPATIENT
Start: 2019-10-28 | End: 2019-12-10

## 2019-10-28 RX ORDER — ACETAMINOPHEN 160 MG
4000 TABLET,DISINTEGRATING ORAL DAILY
Qty: 180 CAPSULE | Refills: 1 | Status: SHIPPED | OUTPATIENT
Start: 2019-10-28 | End: 2020-06-11

## 2019-10-28 RX ORDER — AMLODIPINE BESYLATE 10 MG/1
10 TABLET ORAL DAILY
Qty: 30 TABLET | Refills: 0 | Status: SHIPPED | OUTPATIENT
Start: 2019-10-28 | End: 2019-12-06

## 2019-10-28 RX ORDER — CITALOPRAM HYDROBROMIDE 20 MG/1
20 TABLET ORAL DAILY
Qty: 90 TABLET | Refills: 1 | Status: SHIPPED | OUTPATIENT
Start: 2019-10-28 | End: 2019-12-23

## 2019-10-28 NOTE — TELEPHONE ENCOUNTER
"norvasc  LRF 4/1/19  LOV 9/5/19    Vitamin D3  LRF 4/1/19  LOV 9/5/19    celexa  LRF 4/1/19  LOV 9/5/19    Hydrochlorothiazide   LRF 4/1/19  LOV 9/5/19    Requested Prescriptions   Pending Prescriptions Disp Refills     amLODIPine (NORVASC) 10 MG tablet 90 tablet 1     Sig: Take 1 tablet (10 mg) by mouth daily       Calcium Channel Blockers Protocol  Failed - 10/23/2019  8:01 PM        Failed - Blood pressure under 140/90 in past 12 months     BP Readings from Last 3 Encounters:   10/23/19 (!) 136/90   09/05/19 (!) 148/100   04/17/19 (!) 139/95                 Passed - Recent (12 mo) or future (30 days) visit within the authorizing provider's specialty     Patient has had an office visit with the authorizing provider or a provider within the authorizing providers department within the previous 12 mos or has a future within next 30 days. See \"Patient Info\" tab in inbasket, or \"Choose Columns\" in Meds & Orders section of the refill encounter.              Passed - Medication is active on med list        Passed - Patient is age 18 or older        Passed - Normal serum creatinine on file in past 12 months     Recent Labs   Lab Test 09/05/19  0945   CR 0.84             Cholecalciferol (VITAMIN D3) 50 MCG (2000 UT) CAPS 180 capsule 1     Sig: Take 4,000 Units by mouth daily       Vitamin Supplements (Adult) Protocol Passed - 10/23/2019  8:01 PM        Passed - High dose Vitamin D not ordered        Passed - Recent (12 mo) or future (30 days) visit within the authorizing provider's specialty     Patient has had an office visit with the authorizing provider or a provider within the authorizing providers department within the previous 12 mos or has a future within next 30 days. See \"Patient Info\" tab in inbasket, or \"Choose Columns\" in Meds & Orders section of the refill encounter.              Passed - Medication is active on med list        citalopram (CELEXA) 20 MG tablet 90 tablet 1     Sig: Take 1 tablet (20 mg) by " "mouth daily       SSRIs Protocol Passed - 10/23/2019  8:01 PM        Passed - Recent (12 mo) or future (30 days) visit within the authorizing provider's specialty     Patient has had an office visit with the authorizing provider or a provider within the authorizing providers department within the previous 12 mos or has a future within next 30 days. See \"Patient Info\" tab in inbasket, or \"Choose Columns\" in Meds & Orders section of the refill encounter.              Passed - Medication is active on med list        Passed - Patient is age 18 or older        hydrochlorothiazide (HYDRODIURIL) 25 MG tablet 90 tablet 1     Sig: Take 1 tablet (25 mg) by mouth daily       Diuretics (Including Combos) Protocol Failed - 10/23/2019  8:01 PM        Failed - Blood pressure under 140/90 in past 12 months     BP Readings from Last 3 Encounters:   10/23/19 (!) 136/90   09/05/19 (!) 148/100   04/17/19 (!) 139/95                 Failed - Normal serum potassium on file in past 12 months     Recent Labs   Lab Test 09/05/19  0945   POTASSIUM 3.1*                    Passed - Recent (12 mo) or future (30 days) visit within the authorizing provider's specialty     Patient has had an office visit with the authorizing provider or a provider within the authorizing providers department within the previous 12 mos or has a future within next 30 days. See \"Patient Info\" tab in inbasket, or \"Choose Columns\" in Meds & Orders section of the refill encounter.              Passed - Medication is active on med list        Passed - Patient is age 18 or older        Passed - Normal serum creatinine on file in past 12 months     Recent Labs   Lab Test 09/05/19  0945   CR 0.84              Passed - Normal serum sodium on file in past 12 months     Recent Labs   Lab Test 09/05/19  0945                 Routing refill request to provider for review/approval because:  bp elevated and potassium low for the norvasc and hydrochlorothiazide and vitamin D3 " above daily dose and interactions with celexa

## 2019-10-28 NOTE — TELEPHONE ENCOUNTER
benicar   LRF 9/5/19, disp 90 with directions if unable to fill 90, then please fill 30 with 6 refills - was sent to the same pharmacy.  Will advise to check with the pharmacy.

## 2019-10-30 ENCOUNTER — TELEPHONE (OUTPATIENT)
Dept: CARDIOLOGY | Facility: CLINIC | Age: 44
End: 2019-10-30

## 2019-10-30 NOTE — TELEPHONE ENCOUNTER
Central Prior Authorization Team   Phone: [phone]    PA Initiation    Medication: carvedilol ER (COREG CR) 80 MG 24 hr capsule  [facility] Phone [phone] Fax [phone]  Pharmacy Filling the Rx: [facility] [address] [place] [address] [address]  Filling Pharmacy Phone: [phone]  Filling Pharmacy Fax: [phone]  Start Date: 10/30/2019

## 2019-10-30 NOTE — TELEPHONE ENCOUNTER
Central Prior Authorization Team   Phone: [phone]    PRIOR AUTHORIZATION DENIED    Medication: carvedilol ER (COREG CR) 80 MG 24 hr capsule    Denial Date: 10/30/2019    Denial Rational: Patient must have a history of trial & failure to 2 formulary alternatives or have a contraindication or intolerance to the formulary alternatives: acebutolol, atenolol, bisoprolol          Appeal Information: If provider would like to appeal we will need a detailed letter of medical necessity to start the process. Then re-route this request back to the PA pool.

## 2019-10-30 NOTE — TELEPHONE ENCOUNTER
Prior Authorization Retail Medication Request    Medication/Dose: carvedilol ER (COREG CR) 80 MG 24 hr capsule  ICD code (if different than what is on RX):  Essential hypertension [I10]  Previously Tried and Failed:    Rationale:      Insurance Name:  [facility]  Insurance ID:  [id number]      Pharmacy Information (if different than what is on RX)  Name:  [facility]   Phone:  [phone]      Kristen Steve MA 11:24 AM 10/30/2019

## 2019-11-04 ENCOUNTER — HEALTH MAINTENANCE LETTER (OUTPATIENT)
Age: 44
End: 2019-11-04

## 2019-11-06 NOTE — TELEPHONE ENCOUNTER
Called patient to review recommendations. I left a message, awaiting call back.     Etienne RN, BSN

## 2019-11-19 ENCOUNTER — OFFICE VISIT (OUTPATIENT)
Dept: SLEEP MEDICINE | Facility: CLINIC | Age: 44
End: 2019-11-19
Attending: PHYSICIAN ASSISTANT
Payer: COMMERCIAL

## 2019-11-19 VITALS
DIASTOLIC BLOOD PRESSURE: 92 MMHG | SYSTOLIC BLOOD PRESSURE: 131 MMHG | BODY MASS INDEX: 39.17 KG/M2 | RESPIRATION RATE: 24 BRPM | WEIGHT: 315 LBS | OXYGEN SATURATION: 95 % | HEART RATE: 71 BPM | HEIGHT: 75 IN

## 2019-11-19 DIAGNOSIS — G89.29 CHRONIC LOW BACK PAIN WITHOUT SCIATICA, UNSPECIFIED BACK PAIN LATERALITY: ICD-10-CM

## 2019-11-19 DIAGNOSIS — G47.00 INSOMNIA, UNSPECIFIED TYPE: ICD-10-CM

## 2019-11-19 DIAGNOSIS — G47.33 OSA (OBSTRUCTIVE SLEEP APNEA): Primary | ICD-10-CM

## 2019-11-19 DIAGNOSIS — M54.50 CHRONIC LOW BACK PAIN WITHOUT SCIATICA, UNSPECIFIED BACK PAIN LATERALITY: ICD-10-CM

## 2019-11-19 PROCEDURE — 99214 OFFICE O/P EST MOD 30 MIN: CPT | Performed by: PHYSICIAN ASSISTANT

## 2019-11-19 RX ORDER — GABAPENTIN 100 MG/1
CAPSULE ORAL
Qty: 90 CAPSULE | Refills: 0 | Status: SHIPPED | OUTPATIENT
Start: 2019-11-19 | End: 2019-12-29

## 2019-11-19 ASSESSMENT — MIFFLIN-ST. JEOR: SCORE: 2636

## 2019-11-19 NOTE — NURSING NOTE
"BP (!) 131/92   Pulse 71   Resp 24   Ht 1.892 m (6' 2.5\")   Wt (!) 167.8 kg (370 lb)   SpO2 95%   BMI 46.87 kg/m      Neck 53cm/21inches    DME-none    Med Rec-complete    Ly Ball, Medical Assistant 11/19/2019 10:33 AM      "

## 2019-11-19 NOTE — PATIENT INSTRUCTIONS
Start with 100 mg at bedtime. May increase by 100 mg every 3 days as needed. If 600 mg is reached without benefit, wean off the medication. Call ([phone]-or contact me on MyChart) once 300 mg is reached and I can prescribe a 300 mg capsule. We discussed possible side effects including weight gain, swelling and depression. Discontinue the medication if side effects occur.      A common problem for people with insomnia is spending too much time in bed  trying  to sleep.  You really should only be in bed for no more than 7-8 hours per night.  Spending too much time in bed can lead to being awake and having an  overactive  mind.  One effective way to address this problem is reducing how much time you spend in bed each night.  Be careful with driving or other dangerous activities when trying these strategies however.  We recommend that you follow these steps to improve your insomnia:        Here are the ranges based off your height and current weight.    Body mass index is 46.87 kg/m .        Underweight = <18.5  Normal weight = 18.5-24.9   Overweight = 25-29.9   Obesity = BMI of 30 or greater       Your BMI is Body mass index is 46.87 kg/m .  Weight management is a personal decision.  If you are interested in exploring weight loss strategies, the following discussion covers the approaches that may be successful. Body mass index (BMI) is one way to tell whether you are at a healthy weight, overweight, or obese. It measures your weight in relation to your height.  A BMI of 18.5 to 24.9 is in the healthy range. A person with a BMI of 25 to 29.9 is considered overweight, and someone with a BMI of 30 or greater is considered obese. More than two-thirds of American adults are considered overweight or obese.  Being overweight or obese increases the risk for further weight gain. Excess weight may lead to heart disease and diabetes.  Creating and following plans for healthy eating and physical activity may help you improve  your health.  Weight control is part of healthy lifestyle and includes exercise, emotional health, and healthy eating habits. Careful eating habits lifelong are the mainstay of weight control. Though there are significant health benefits from weight loss, long-term weight loss with diet alone may be very difficult to achieve- studies show long-term success with dietary management in less than 10% of people. Attaining a healthy weight may be especially difficult to achieve in those with severe obesity. In some cases, medications, devices and surgical management might be considered.  What can you do?  If you are overweight or obese and are interested in methods for weight loss, you should discuss this with your provider.     Consider reducing daily calorie intake by 500 calories.     Keep a food journal.     Avoiding skipping meals, consider cutting portions instead.    Diet combined with exercise helps maintain muscle while optimizing fat loss. Strength training is particularly important for building and maintaining muscle mass. Exercise helps reduce stress, increase energy, and improves fitness. Increasing exercise without diet control, however, may not burn enough calories to loose weight.       Start walking three days a week 10-20 minutes at a time    Work towards walking thirty minutes five days a week     Eventually, increase the speed of your walking for 1-2 minutes at time    In addition, we recommend that you review healthy lifestyles and methods for weight loss available through the National Institutes of Health patient information sites:  http://win.niddk.nih.gov/publications/index.htm    And look into health and wellness programs that may be available through your health insurance provider, employer, local community center, or isaura club.    Weight management plan: Patient was referred to their PCP to discuss a diet and exercise plan.   Your blood pressure was checked while you were in clinic  today.  Please read the guidelines below about what these numbers mean and what you should do about them.  Your systolic blood pressure is the top number.  This is the pressure when the heart is pumping.  Your diastolic blood pressure is the bottom number.  This is the pressure in between beats.  If your systolic blood pressure is less than 120 and your diastolic blood pressure is less than 80, then your blood pressure is normal. There is nothing more that you need to do about it  If your systolic blood pressure is 120-139 or your diastolic blood pressure is 80-89, your blood pressure may be higher than it should be.  You should have your blood pressure re-checked within a year by a primary care provider.  If your systolic blood pressure is 140 or greater or your diastolic blood pressure is 90 or greater, you may have high blood pressure.  High blood pressure is treatable, but if left untreated over time it can put you at risk for heart attack, stroke, or kidney failure.  You should have your blood pressure re-checked by a primary care provider within the next four weeks.

## 2019-11-19 NOTE — PROGRESS NOTES
Faxed checklist for pap transfers to [facility], to include signed LYRIC to transfer from [facility] to [facility], copy of RX for supplies, demographic, copy of 2015 split , office notes 11/19/19, machine type, mask type.

## 2019-11-19 NOTE — PROGRESS NOTES
Sleep Consultation:    Date on this visit: 11/19/2019    Jonny Quinones is sent by Nessa Braden for a sleep consultation regarding TREMAINE.    Primary Physician: [facility]     Jonny Quinones presents to establish care for TREMAINE. His medical history is significant for HTN, depression/anxiety. He presents with his wife.     He had a sleep study at [facility] on 11/12/2015. His AHI was 57.5/hr, RDI 80/hr, O2 robert 81%. His PLM index was 0/hr. CPAP was titrated to 8 cm. His weight was 320.    He is on CPAP 10 cm. He does not have any concerns. He just recently re-certified with the DOT, so does not need documentation of compliance today. He uses a full face mask (Simplus). He has not replaced the mask for a couple of years. He does replace the filter.  He puts the cushion a little low on his face to prevent leak at the nasal bridge. He sleeps on his stomach so often pushes the mask onto his face with his arm to help the seal. He does not snore with CPAP. He denies dry nose or mouth. He does not use the humidifier. He does drool into the mask. He is comfortable with the pressure.    The compliance data shows that the patient used the CPAP for 25/30 nights, 83.3% of nights for >4 hours.  The 90th% pressure is 10 cm.  The average time in large leak is 6 min 50 sec.  The average nightly usage is 8:54.  The average AHI is 2.9/hr.      Jonny's bedtime will vary depending on when he has to get up to work. He can start work at 11 PM or as late as 9 AM. In the last month, his bedtime has ranged from 8 PM to about 11 PM. His wake time ranged from about 4 AM to about 8-9 AM. He does use an alarm and does not have any trouble getting up to the alarm. He falls asleep in 10 minutes with medication.  Jonny falls asleep in about 1 hour without medication and wakes more. He takes melatonin 10-12.5 mg melatonin and Advil PM (for herniated disc) He wakes up 1-2 times a night for 20 minutes before falling back  to sleep.  Jonny wakes up to go to the bathroom and pain.  On days off, he goes to bed 9-10 PM and is up 6:30-7 AM.  Patient gets an average of 9 hours of sleep per night.     Patient does use electronics in bed, watch TV in bed and worry in bed about what he has to do the next day and does not read in bed.     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  He works rotating shifts. He works 10, 12 or 14 hour days. He then has 10 hours off. 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 He is self-employed as a . He lives with his wife and step son.    Jonny sleeps on his stomach. He wears the sheets out at his feet. He thinks he kicks a lot in his sleep. His wife says he has spurts of kicking in his sleep. He does have pain and restlessness in his legs in the evening. He is told it is from his back (herniated disc). He also fidgets with his hands as he falls asleep. It takes a while to find a comfortable position. He was given cyclobenzaprine but he did not like the way he felt on it. He has occasional morning headaches (1-2 times per week), denies morning confusion. Jonny denies any bruxism, sleep walking, sleep talking, dream enactment, sleep paralysis, cataplexy and hypnogogic/hypnopompic hallucinations.    Jonny has difficulty breathing through his nose, depression and anxiety, denies claustrophobia, reflux at night and heartburn.      Jonny has gained 50 pounds in the last 4 years.  Patient describes themself as either a morning or night person. His sleep schedule tends to vary with the sun. He would prefer to go to sleep at 10:00 PM and wake up at 6:30 AM.  Patient's [place] Sleepiness score 2/24 inconsistent with daytime sleepiness.      Jonny does not take naps. He lays down in his truck over lunch but does not fall asleep. He takes no inadvertant naps.  He denies dozing while driving. Patient was counseled on the importance of driving while alert, to pull over if drowsy, or nap before getting into the  vehicle if sleepy.  He uses two 16 oz sodas/day. Last caffeine intake is usually before 4-5 PM.    Allergies:    Allergies   Allergen Reactions     Allopurinol GI Disturbance     Amoxicillin Other (See Comments)     headaches     Bupropion Other (See Comments)     Seasonal Allergies      Dust, mold, pollen       Venlafaxine Other (See Comments)       Medications:    Current Outpatient Medications   Medication Sig Dispense Refill     amLODIPine (NORVASC) 10 MG tablet Take 1 tablet (10 mg) by mouth daily DUE FOR AN APPT 3/20 30 tablet 0     carvedilol ER (COREG CR) 80 MG 24 hr capsule Take 1 capsule (80 mg) by mouth daily 30 capsule 11     Cholecalciferol (VITAMIN D3) 50 MCG (2000 UT) CAPS Take 4,000 Units by mouth daily DUE FOR AN APPT 3/20 180 capsule 1     citalopram (CELEXA) 20 MG tablet Take 1 tablet (20 mg) by mouth daily DUE FOR AN APPT 3/20 90 tablet 1     hydrochlorothiazide (HYDRODIURIL) 25 MG tablet Take 1 tablet (25 mg) by mouth daily DUE FOR AN APPT 3/20 30 tablet 0     ibuprofen (ADVIL/MOTRIN) 200 MG tablet Take 200 mg by mouth every 4 hours as needed for mild pain       naproxen (NAPROSYN) 500 MG tablet Take 1 tablet (500 mg) by mouth daily as needed for moderate pain 60 tablet 0     olmesartan (BENICAR) 40 MG tablet Take 1 tablet (40 mg) by mouth daily 15 tablet 0     UNABLE TO FIND MEDICATION NAME: CPAP machine         Problem List:  Patient Active Problem List    Diagnosis Date Noted     Chronic low back pain without sciatica, unspecified back pain laterality 04/01/2019     Priority: Medium     CME (cystoid macular edema), right 02/06/2019     Priority: Medium     Seeing Vitreo Retinal Surgery       Essential hypertension 02/05/2019     Priority: Medium     Anxiety state 02/05/2019     Priority: Medium     Allergic rhinitis 02/05/2019     Priority: Medium     Overview:   Dust, mold, pollen       Abscess of lung (H) 02/05/2019     Priority: Medium     Overview:   H/O       Morbid obesity (H)  02/05/2019     Priority: Medium     Factor V Leiden (H) 02/05/2019     Priority: Medium     Lumbar herniated disc 02/05/2019     Priority: Medium     Vitamin D deficiency 04/10/2015     Priority: Medium     Prediabetes 04/25/2013     Priority: Medium     Overview:   4/2013 glucose 103       Lipid screening 01/22/2013     Priority: Medium     Gout, unspecified 02/14/2012     Priority: Medium     TREMAINE (obstructive sleep apnea) 11/05/2009     Priority: Medium        Past Medical/Surgical History:  Past Medical History:   Diagnosis Date     Anxiety      Factor V Leiden (H)      Gout      Hypertension      Pre-diabetes      Sleep apnea      Vitamin D deficiency      Past Surgical History:   Procedure Laterality Date     athroereisis  03/27/2009    AL Subtalar     ENT SURGERY Bilateral 1985    tonsilectomy     LAPAROSCOPIC GASTRIC ADJUSTABLE BANDING  11/2009     SINUS SURGERY  2001     sphincterotomy  02/2009    lateral internal       Social History:  Social History     Socioeconomic History     Marital status:      Spouse name: Not on file     Number of children: Not on file     Years of education: Not on file     Highest education level: Not on file   Occupational History     Not on file   Social Needs     Financial resource strain: Not on file     Food insecurity:     Worry: Not on file     Inability: Not on file     Transportation needs:     Medical: Not on file     Non-medical: Not on file   Tobacco Use     Smoking status: Never Smoker     Smokeless tobacco: Never Used   Substance and Sexual Activity     Alcohol use: No     Frequency: Never     Drug use: No     Sexual activity: Yes     Partners: Female     Birth control/protection: I.U.D.   Lifestyle     Physical activity:     Days per week: Not on file     Minutes per session: Not on file     Stress: Not on file   Relationships     Social connections:     Talks on phone: Not on file     Gets together: Not on file     Attends [employer] service: Not on file      Active member of club or organization: Not on file     Attends meetings of clubs or organizations: Not on file     Relationship status: Not on file     Intimate partner violence:     Fear of current or ex partner: Not on file     Emotionally abused: Not on file     Physically abused: Not on file     Forced sexual activity: Not on file   Other Topics Concern     Not on file   Social History Narrative     Not on file       Family History:  Family History   Problem Relation Age of Onset     Allergies Mother      Blood Disease Mother         Leiden Factor 5     Cerebrovascular Disease Mother      Hypertension Father      Hypertension Maternal Grandmother      Peripheral Vascular Disease Maternal Grandmother      Allergies Brother      Breast Cancer Paternal Aunt      Thrombosis Paternal Grandfather        Review of Systems:  A complete review of systems reviewed by me is negative with the exeption of what has been mentioned in the history of present illness.  CONSTITUTIONAL: NEGATIVE for weight gain/loss, fever, chills, sweats or night sweats, drug allergies.  EYES: NEGATIVE for blind spots, double vision.  EYES:  POSITIVE for changes in vision  ENT: NEGATIVE for ear pain, sore throat, sinus pain, post-nasal drip, runny nose, bloody nose  CARDIAC: NEGATIVE for fast heartbeats or fluttering in chest, chest pain or pressure, breathlessness when lying flat, swollen legs or swollen feet.  NEUROLOGIC: NEGATIVE headaches, weakness or numbness in the arms or legs.  DERMATOLOGIC: NEGATIVE for rashes, new moles or change in mole(s)  PULMONARY: NEGATIVE SOB at rest, SOB with activity, dry cough, productive cough, coughing up blood, wheezing or whistling when breathing.    GASTROINTESTINAL: NEGATIVE for nausea or vomitting, loose or watery stools, fat or grease in stools, constipation, abdominal pain, bowel movements black in color or blood noted.  GENITOURINARY: NEGATIVE for pain during urination, blood in urine, urinating more  "frequently than usual, irregular menstrual periods.  MUSCULOSKELETAL: NEGATIVE for muscle pain, bone or joint pain, swollen joints.  ENDOCRINE: NEGATIVE for increased thirst or urination, diabetes.  LYMPHATIC: NEGATIVE for swollen lymph nodes, lumps or bumps in the breasts or nipple discharge.  MENTAL HEALTH: POSITIVE for depression and anxiety    Physical Examination:  Vitals: BP (!) 131/92   Pulse 71   Resp 24   Ht 1.892 m (6' 2.5\")   Wt (!) 167.8 kg (370 lb)   SpO2 95%   BMI 46.87 kg/m    BMI= Body mass index is 46.87 kg/m .    Neck Cir (cm): 53 cm    [place] Total Score 11/19/2019   Total score - [place] 2       SHANNA Total Score: 10 (11/19/19 1000)    GENERAL APPEARANCE: healthy, alert, no distress and cooperative  EYES: Eyes grossly normal to inspection, PERRL, conjunctivae mildly injected and sclerae normal and lids and lashes normal  HENT: nose and mouth without ulcers or lesions, oropharynx crowded/small and tongue base sits high in mouth  NECK: no adenopathy, no asymmetry, masses, or scars, thyroid normal to palpation and trachea midline and normal to palpation  RESP: lungs clear to auscultation - no rales, rhonchi or wheezes  CV: regular rates and rhythm, normal S1 S2, no S3 or S4, no murmur, click or rub and no irregular beats  LYMPHATICS: no cervical adenopathy  MS: extremities normal- no gross deformities noted  NEURO: Normal strength and tone, mentation intact, speech normal and cranial nerves 2-12 intact  Mallampati Class: IV.  Tonsillar Stage: 0  surgically removed.    Impression/Plan:    (G47.33) TREMAINE (obstructive sleep apnea)  (primary encounter diagnosis)  Comment: Severe TREMAINE diagnosed at [facility] in 2015. On CPAP 10 cm now. Mask does not fit ideally. Does not replace supplies often. He has gained 50 pounds since his study and his BMI is now 46. His download shows well controlled apnea. He is a  and just recently recertified with the DOT. SpO2 is 95% today. CO2 has been as high " as 31 on metabolic panel. He has morning headaches about 1-2 times per week.   Plan: Changed pressures to 10-13 cm. We looked at a few alternative masks that may fit better. I discussed that I was concerned about his weight as it can lead to hypoventilation/hypoxemia.  We may consider oximetry at the follow up if he is sleeping better and his mask is fitting well.       (G47.00) Insomnia, unspecified type, (M54.5,  G89.29) Chronic low back pain without sciatica, unspecified back pain laterality  Comment: Chronic pain from a herniated disk and variable work schedule. He tries to be in bed 9-10 hours per night. He takes melatonin and Advil PM to help him sleep.   Plan: He was advised to reduce time in bed to 8 hours per night. Will try gabapentin to help with pain and sleep.       Literature provided regarding insomnia.      He will follow up with me in approximately two months.       Polysomnography reviewed.  Obstructive sleep apnea reviewed.  Complications of untreated sleep apnea were reviewed.    Bennett Goltz, PA-C    CC: Nessa Braden

## 2019-12-04 NOTE — TELEPHONE ENCOUNTER
No call back, I tried three times to reach patient.     Closed encounter.     Messaged Dr. Rey w/update.     TGajd RN, BSN

## 2019-12-06 ENCOUNTER — MYC REFILL (OUTPATIENT)
Dept: FAMILY MEDICINE | Facility: CLINIC | Age: 44
End: 2019-12-06

## 2019-12-06 DIAGNOSIS — I10 ESSENTIAL HYPERTENSION: ICD-10-CM

## 2019-12-06 RX ORDER — OLMESARTAN MEDOXOMIL 40 MG/1
40 TABLET ORAL DAILY
Qty: 15 TABLET | Refills: 0 | Status: CANCELLED | OUTPATIENT
Start: 2019-12-06

## 2019-12-10 NOTE — TELEPHONE ENCOUNTER
"Duplicate refills at pharm  Gita SAHNI RN     Benicar 40 mg  Last Written Prescription Date:  9/5/19  Last Fill Quantity: 90,  # refills: 1   Last office visit: 9/5/2019 with prescribing provider:  ANGELIQUE   Future Office Visit:   Next 5 appointments (look out 90 days)    Dec 23, 2019  1:00 PM CST  Office Visit with Robe Gaston PA-C  [facility] [address] [address] [address] [phone]   Jonathan 15, 2020 10:15 AM CST  Return Visit with Jarad Rey MD  [facility] [address]  [address]  [facility] [address]  [phone]         Requested Prescriptions   Pending Prescriptions Disp Refills     olmesartan (BENICAR) 40 MG tablet 15 tablet 0     Sig: Take 1 tablet (40 mg) by mouth daily       Angiotensin-II Receptors Failed - 12/6/2019  8:20 AM        Failed - Last blood pressure under 140/90 in past 12 months     BP Readings from Last 3 Encounters:   11/19/19 (!) 131/92   10/23/19 (!) 136/90   09/05/19 (!) 148/100                 Failed - Normal serum potassium on file in past 12 months     Recent Labs   Lab Test 09/05/19  0945   POTASSIUM 3.1*                    Passed - Recent (12 mo) or future (30 days) visit within the authorizing provider's specialty     Patient has had an office visit with the authorizing provider or a provider within the authorizing providers department within the previous 12 mos or has a future within next 30 days. See \"Patient Info\" tab in inbasket, or \"Choose Columns\" in Meds & Orders section of the refill encounter.              Passed - Medication is active on med list        Passed - Patient is age 18 or older        Passed - Normal serum creatinine on file in past 12 months     Recent Labs   Lab Test 09/05/19  0945   CR 0.84                 "

## 2019-12-10 NOTE — TELEPHONE ENCOUNTER
Routing refill request to provider for review/approval because:  Protocol Failed: Labs. Looks like he is due to see you in March 2020.    LMTCB- for pt to ask if he is okay taking potassium supplements.     Gita SAHNI RN       Hydrochlorothiazide 25 mg  Last Written Prescription Date:  10/28/19  Last Fill Quantity: 30,  # refills: 0   Last office visit: 9/5/2019 with prescribing provider:  ANGELIQUE   Future Office Visit:   Next 5 appointments (look out 90 days)    Dec 23, 2019  1:00 PM CST  [facility]e [facility] [address] [address] [phone]   Jonathan 15, 2020 10:15 AM CST  Return Visit with Jarad Rey MD  [facility] [address] [place] [address]  [address]  [facility] [address]  [phone]         Amlodipine 10 mg  Last Written Prescription Date:  10/28/19  Last Fill Quantity: 30,  # refills: 0   Last office visit: 9/5/2019 with prescribing provider:  ANGELIQUE   Future Office Visit:   Next 5 appointments (look out 90 days)    Dec 23, 2019  1:00 PM CST  Office Visit with Robe Gaston [facility]-C  [facility] [address] [id number] [phone]   Jonathan 15, 2020 10:15 AM CST  Return Visit with Jarad Rey MD  [facility] ([facility]) [address] [place] [address]  [address]  [facility] [address]  [phone]           Requested Prescriptions   Pending Prescriptions Disp Refills     amLODIPine (NORVASC) 10 MG tablet 90 tablet 0     Sig: Take 1 tablet (10 mg) by mouth daily       Calcium Channel Blockers Protocol  Failed - 12/10/2019  9:23 AM        Failed - Blood pressure under 140/90 in past 12 months     BP Readings from Last 3 Encounters:   11/19/19 (!) 131/92   10/23/19 (!) 136/90   09/05/19 (!) 148/100                 " Passed - Recent (12 mo) or future (30 days) visit within the authorizing provider's specialty     Patient has had an office visit with the authorizing provider or a provider within the authorizing providers department within the previous 12 mos or has a future within next 30 days. See \"Patient Info\" tab in inbasket, or \"Choose Columns\" in Meds & Orders section of the refill encounter.              Passed - Medication is active on med list        Passed - Patient is age 18 or older        Passed - Normal serum creatinine on file in past 12 months     Recent Labs   Lab Test 09/05/19  0945   CR 0.84             hydrochlorothiazide (HYDRODIURIL) 25 MG tablet 90 tablet 0     Sig: Take 1 tablet (25 mg) by mouth daily       Diuretics (Including Combos) Protocol Failed - 12/10/2019  9:23 AM        Failed - Blood pressure under 140/90 in past 12 months     BP Readings from Last 3 Encounters:   11/19/19 (!) 131/92   10/23/19 (!) 136/90   09/05/19 (!) 148/100                 Failed - Normal serum potassium on file in past 12 months     Recent Labs   Lab Test 09/05/19  0945   POTASSIUM 3.1*                    Passed - Recent (12 mo) or future (30 days) visit within the authorizing provider's specialty     Patient has had an office visit with the authorizing provider or a provider within the authorizing providers department within the previous 12 mos or has a future within next 30 days. See \"Patient Info\" tab in inbasket, or \"Choose Columns\" in Meds & Orders section of the refill encounter.              Passed - Medication is active on med list        Passed - Patient is age 18 or older        Passed - Normal serum creatinine on file in past 12 months     Recent Labs   Lab Test 09/05/19  0945   CR 0.84              Passed - Normal serum sodium on file in past 12 months     Recent Labs   Lab Test 09/05/19  0945                     "

## 2019-12-11 RX ORDER — HYDROCHLOROTHIAZIDE 25 MG/1
25 TABLET ORAL DAILY
Qty: 90 TABLET | Refills: 0 | Status: SHIPPED | OUTPATIENT
Start: 2019-12-11 | End: 2019-12-23

## 2019-12-11 RX ORDER — AMLODIPINE BESYLATE 10 MG/1
10 TABLET ORAL DAILY
Qty: 90 TABLET | Refills: 0 | Status: SHIPPED | OUTPATIENT
Start: 2019-12-11 | End: 2019-12-23

## 2019-12-20 DIAGNOSIS — I10 ESSENTIAL HYPERTENSION: Primary | ICD-10-CM

## 2019-12-20 NOTE — PROGRESS NOTES
Subjective     Jonny Quinones is a 44 year old male who presents to clinic today for the following health issues:    HPI   Hypertension Follow-up      Do you check your blood pressure regularly outside of the clinic? No     Are you following a low salt diet? No    Are your blood pressures ever more than 140 on the top number (systolic) OR more   than 90 on the bottom number (diastolic), for example 140/90? Yes    Jonny Quinones is a 44 year old male who presents today for blood pressure check  He notes that overall he feels well controlled - saw cardiology in October; considered carvedilol   Uses CPAP machine on the regular  Does monitor salt  Denies chest pain, shortness of breath       Anxiety Follow-Up    How are you doing with your anxiety since your last visit? Stable     Are you having other symptoms that might be associated with anxiety? No    Have you had a significant life event? No     Are you feeling depressed? No    Do you have any concerns with your use of alcohol or other drugs? No    Social History     Tobacco Use     Smoking status: Never Smoker     Smokeless tobacco: Never Used   Substance Use Topics     Alcohol use: No     Frequency: Never     Drug use: No     JULIUS-7 SCORE 2/5/2019 4/1/2019   Total Score 12 7     PHQ 2/5/2019 4/1/2019   PHQ-9 Total Score 17 4   Q9: Thoughts of better off dead/self-harm past 2 weeks Not at all Not at all     Last PHQ-9 4/1/2019   1.  Little interest or pleasure in doing things 1   2.  Feeling down, depressed, or hopeless 0   3.  Trouble falling or staying asleep, or sleeping too much 1   4.  Feeling tired or having little energy 1   5.  Poor appetite or overeating 1   6.  Feeling bad about yourself 0   7.  Trouble concentrating 0   8.  Moving slowly or restless 0   Q9: Thoughts of better off dead/self-harm past 2 weeks 0   PHQ-9 Total Score 4   Difficulty at work, home, or with people Somewhat difficult     JULIUS-7  4/1/2019   1. Feeling nervous, anxious, or on  edge 2   2. Not being able to stop or control worrying 1   3. Worrying too much about different things 1   4. Trouble relaxing 0   5. Being so restless that it is hard to sit still 1   6. Becoming easily annoyed or irritable 2   7. Feeling afraid, as if something awful might happen 0   JULIUS-7 Total Score 7   If you checked any problems, how difficult have they made it for you to do your work, take care of things at home, or get along with other people? Somewhat difficult       How many servings of fruits and vegetables do you eat daily?  0-1    On average, how many sweetened beverages do you drink each day (Examples: soda, juice, sweet tea, etc.  Do NOT count diet or artificially sweetened beverages)?  2    How many days per week do you miss taking your medication? 0    He has been on celexa for a number of years, estimates 5  Does think very effective; spouse additionally thinks great benefit  Limits rumination and irritability      Patient Active Problem List   Diagnosis     Prediabetes     Lipid screening     Gout, unspecified     Essential hypertension     TREMAINE (obstructive sleep apnea)     Vitamin D deficiency     Anxiety state     Allergic rhinitis     Abscess of lung (H)     Morbid obesity (H)     Factor V Leiden (H)     Lumbar herniated disc     CME (cystoid macular edema), right     Chronic low back pain without sciatica, unspecified back pain laterality     Past Surgical History:   Procedure Laterality Date     athroereisis  03/27/2009    CT Subtalar     ENT SURGERY Bilateral 1985    tonsilectomy     LAPAROSCOPIC GASTRIC ADJUSTABLE BANDING  11/2009     SINUS SURGERY  2001     sphincterotomy  02/2009    lateral internal       Social History     Tobacco Use     Smoking status: Never Smoker     Smokeless tobacco: Never Used   Substance Use Topics     Alcohol use: No     Frequency: Never     Family History   Problem Relation Age of Onset     Allergies Mother      Blood Disease Mother         Leiden Factor 5      "Cerebrovascular Disease Mother      Hypertension Father      Hypertension Maternal Grandmother      Peripheral Vascular Disease Maternal Grandmother      Allergies Brother      Breast Cancer Paternal Aunt      Thrombosis Paternal Grandfather            Reviewed and updated as needed this visit by Provider         Review of Systems   ROS COMP: Constitutional, HEENT, cardiovascular, pulmonary, gi and gu systems are negative, except as otherwise noted.      Objective    BP (!) 160/100   Pulse 86   Temp 98.2  F (36.8  C) (Tympanic)   Resp 18   Ht 1.892 m (6' 2.5\")   Wt (!) 173.5 kg (382 lb 6.4 oz)   SpO2 96%   BMI 48.44 kg/m    Body mass index is 48.44 kg/m .  Physical Exam   GENERAL: healthy, alert and no distress  RESP: lungs clear to auscultation - no rales, rhonchi or wheezes  CV: regular rates and rhythm, normal S1 S2, no S3 or S4 and no murmur, click or rub  MS: No peripheral edema    PSYCH: mentation appears normal, affect normal/bright    Diagnostic Test Results:  Labs reviewed in Epic  Updating bmp        Assessment & Plan     1. Essential hypertension  Uncontrolled. Has not had metroprolol for around 4-5 days. Denies any symptoms. Restarting all medications. Recommend recheck at very least at pharmacy in 1-2 weeks  - hydrochlorothiazide (HYDRODIURIL) 25 MG tablet; Take 1 tablet (25 mg) by mouth daily  Dispense: 90 tablet; Refill: 1  - amLODIPine (NORVASC) 10 MG tablet; Take 1 tablet (10 mg) by mouth daily  Dispense: 90 tablet; Refill: 1  - metoprolol succinate ER (TOPROL-XL) 100 MG 24 hr tablet; Take 1 tablet (100 mg) by mouth daily  Dispense: 90 tablet; Refill: 1  - metoprolol succinate ER (TOPROL-XL) 50 MG 24 hr tablet; Take 1 tablet (50 mg) by mouth daily  Dispense: 90 tablet; Refill: 1  - olmesartan (BENICAR) 40 MG tablet; Take 1 tablet (40 mg) by mouth daily  Dispense: 90 tablet; Refill: 1    2. Anxiety state  Well controlled. Continue present management.   - citalopram (CELEXA) 20 MG tablet; Take " 1 tablet (20 mg) by mouth daily  Dispense: 90 tablet; Refill: 1    3. Hypokalemia  Needs updating. On diuretic. Plan for supplementation if low  - Basic metabolic panel       RECOMMEND ANNUAL PHYSICAL in 2-3 months    Return in about 6 months (around 6/23/2020) for Physical Exam, Lab Work, BP Recheck.    Robe Gaston PA-C  [facility]

## 2019-12-23 ENCOUNTER — OFFICE VISIT (OUTPATIENT)
Dept: FAMILY MEDICINE | Facility: CLINIC | Age: 44
End: 2019-12-23
Payer: COMMERCIAL

## 2019-12-23 VITALS
DIASTOLIC BLOOD PRESSURE: 100 MMHG | WEIGHT: 315 LBS | HEART RATE: 86 BPM | TEMPERATURE: 98.2 F | BODY MASS INDEX: 39.17 KG/M2 | SYSTOLIC BLOOD PRESSURE: 160 MMHG | OXYGEN SATURATION: 96 % | RESPIRATION RATE: 18 BRPM | HEIGHT: 75 IN

## 2019-12-23 DIAGNOSIS — I10 ESSENTIAL HYPERTENSION: ICD-10-CM

## 2019-12-23 DIAGNOSIS — F41.1 ANXIETY STATE: ICD-10-CM

## 2019-12-23 DIAGNOSIS — E87.6 HYPOKALEMIA: Primary | ICD-10-CM

## 2019-12-23 PROCEDURE — 80048 BASIC METABOLIC PNL TOTAL CA: CPT | Performed by: PHYSICIAN ASSISTANT

## 2019-12-23 PROCEDURE — 36415 COLL VENOUS BLD VENIPUNCTURE: CPT | Performed by: PHYSICIAN ASSISTANT

## 2019-12-23 PROCEDURE — 99214 OFFICE O/P EST MOD 30 MIN: CPT | Performed by: PHYSICIAN ASSISTANT

## 2019-12-23 RX ORDER — METOPROLOL SUCCINATE 50 MG/1
50 TABLET, EXTENDED RELEASE ORAL DAILY
Qty: 90 TABLET | Refills: 1 | Status: SHIPPED | OUTPATIENT
Start: 2019-12-23 | End: 2020-06-08

## 2019-12-23 RX ORDER — OLMESARTAN MEDOXOMIL 40 MG/1
40 TABLET ORAL DAILY
Qty: 90 TABLET | Refills: 1 | Status: SHIPPED | OUTPATIENT
Start: 2019-12-23 | End: 2020-07-14

## 2019-12-23 RX ORDER — CITALOPRAM HYDROBROMIDE 20 MG/1
20 TABLET ORAL DAILY
Qty: 90 TABLET | Refills: 1 | Status: SHIPPED | OUTPATIENT
Start: 2019-12-23 | End: 2020-07-14

## 2019-12-23 RX ORDER — METOPROLOL SUCCINATE 50 MG/1
50 TABLET, EXTENDED RELEASE ORAL DAILY
COMMUNITY
Start: 2018-05-02 | End: 2019-12-23

## 2019-12-23 RX ORDER — METOPROLOL SUCCINATE 50 MG/1
TABLET, EXTENDED RELEASE ORAL
Qty: 90 TABLET | Refills: 0
Start: 2019-12-23

## 2019-12-23 RX ORDER — AMLODIPINE BESYLATE 10 MG/1
10 TABLET ORAL DAILY
Qty: 90 TABLET | Refills: 1 | Status: SHIPPED | OUTPATIENT
Start: 2019-12-23 | End: 2020-07-15

## 2019-12-23 RX ORDER — METOPROLOL SUCCINATE 100 MG/1
100 TABLET, EXTENDED RELEASE ORAL DAILY
Refills: 1 | COMMUNITY
Start: 2019-08-13 | End: 2019-12-23

## 2019-12-23 RX ORDER — METOPROLOL SUCCINATE 100 MG/1
100 TABLET, EXTENDED RELEASE ORAL DAILY
Qty: 90 TABLET | Refills: 1 | Status: SHIPPED | OUTPATIENT
Start: 2019-12-23 | End: 2020-06-08

## 2019-12-23 RX ORDER — HYDROCHLOROTHIAZIDE 25 MG/1
25 TABLET ORAL DAILY
Qty: 90 TABLET | Refills: 1 | Status: SHIPPED | OUTPATIENT
Start: 2019-12-23 | End: 2020-07-14

## 2019-12-23 ASSESSMENT — ANXIETY QUESTIONNAIRES
7. FEELING AFRAID AS IF SOMETHING AWFUL MIGHT HAPPEN: NOT AT ALL
6. BECOMING EASILY ANNOYED OR IRRITABLE: NOT AT ALL
GAD7 TOTAL SCORE: 0
IF YOU CHECKED OFF ANY PROBLEMS ON THIS QUESTIONNAIRE, HOW DIFFICULT HAVE THESE PROBLEMS MADE IT FOR YOU TO DO YOUR WORK, TAKE CARE OF THINGS AT HOME, OR GET ALONG WITH OTHER PEOPLE: NOT DIFFICULT AT ALL
5. BEING SO RESTLESS THAT IT IS HARD TO SIT STILL: NOT AT ALL
1. FEELING NERVOUS, ANXIOUS, OR ON EDGE: NOT AT ALL
2. NOT BEING ABLE TO STOP OR CONTROL WORRYING: NOT AT ALL
3. WORRYING TOO MUCH ABOUT DIFFERENT THINGS: NOT AT ALL

## 2019-12-23 ASSESSMENT — MIFFLIN-ST. JEOR: SCORE: 2692.25

## 2019-12-23 ASSESSMENT — PATIENT HEALTH QUESTIONNAIRE - PHQ9
5. POOR APPETITE OR OVEREATING: NOT AT ALL
SUM OF ALL RESPONSES TO PHQ QUESTIONS 1-9: 3

## 2019-12-24 DIAGNOSIS — E87.6 HYPOKALEMIA: Primary | ICD-10-CM

## 2019-12-24 LAB
ANION GAP SERPL CALCULATED.3IONS-SCNC: 7 MMOL/L (ref 3–14)
BUN SERPL-MCNC: 14 MG/DL (ref 7–30)
CALCIUM SERPL-MCNC: 9.1 MG/DL (ref 8.5–10.1)
CHLORIDE SERPL-SCNC: 104 MMOL/L (ref 94–109)
CO2 SERPL-SCNC: 28 MMOL/L (ref 20–32)
CREAT SERPL-MCNC: 0.94 MG/DL (ref 0.66–1.25)
GFR SERPL CREATININE-BSD FRML MDRD: >90 ML/MIN/{1.73_M2}
GLUCOSE SERPL-MCNC: 101 MG/DL (ref 70–99)
POTASSIUM SERPL-SCNC: 3.3 MMOL/L (ref 3.4–5.3)
SODIUM SERPL-SCNC: 139 MMOL/L (ref 133–144)

## 2019-12-24 RX ORDER — POTASSIUM CHLORIDE 750 MG/1
10 TABLET, EXTENDED RELEASE ORAL DAILY
Qty: 90 TABLET | Refills: 1 | Status: SHIPPED | OUTPATIENT
Start: 2019-12-24 | End: 2020-06-08

## 2019-12-24 ASSESSMENT — ANXIETY QUESTIONNAIRES: GAD7 TOTAL SCORE: 0

## 2019-12-27 ENCOUNTER — ALLIED HEALTH/NURSE VISIT (OUTPATIENT)
Dept: FAMILY MEDICINE | Facility: CLINIC | Age: 44
End: 2019-12-27
Payer: COMMERCIAL

## 2019-12-27 ENCOUNTER — HOSPITAL ENCOUNTER (OUTPATIENT)
Dept: CARDIOLOGY | Facility: CLINIC | Age: 44
Discharge: HOME | End: 2019-12-27
Attending: INTERNAL MEDICINE | Admitting: INTERNAL MEDICINE
Payer: COMMERCIAL

## 2019-12-27 VITALS — DIASTOLIC BLOOD PRESSURE: 86 MMHG | SYSTOLIC BLOOD PRESSURE: 136 MMHG

## 2019-12-27 DIAGNOSIS — I10 ESSENTIAL HYPERTENSION: ICD-10-CM

## 2019-12-27 DIAGNOSIS — Z01.30 BP CHECK: Primary | ICD-10-CM

## 2019-12-27 PROCEDURE — 93975 VASCULAR STUDY: CPT | Mod: TC

## 2019-12-27 PROCEDURE — 99207 ZZC NO CHARGE NURSE ONLY: CPT | Performed by: PHYSICIAN ASSISTANT

## 2019-12-27 PROCEDURE — 76770 US EXAM ABDO BACK WALL COMP: CPT | Mod: 26 | Performed by: INTERNAL MEDICINE

## 2019-12-27 PROCEDURE — 93975 VASCULAR STUDY: CPT | Mod: 26 | Performed by: INTERNAL MEDICINE

## 2019-12-27 NOTE — PROGRESS NOTES
Jonny Quinones was evaluated at [facility] on December 27, 2019 at which time his blood pressure was:    BP Readings from Last 3 Encounters:   12/27/19 136/86   12/23/19 (!) 160/100   11/19/19 (!) 131/92     Pulse Readings from Last 3 Encounters:   12/23/19 86   11/19/19 71   10/23/19 74       Reviewed lifestyle modifications for blood pressure control and reduction: including making healthy food choices, managing weight, getting regular exercise, smoking cessation, reducing alcohol consumption, monitoring blood pressure regularly.     Symptoms: None    BP Goal:< 140/90 mmHg    BP Assessment:  BP at goal (patient restarted blood pressure medication again)    Potential Reasons for BP too high: NA - Not applicable     BP Follow-Up Plan: Recheck BP in 6 months at pharmacy    Recommendation to Provider: none    Note completed by: Jose Luis Howard    Thank You   [facility]

## 2019-12-29 ENCOUNTER — MYC REFILL (OUTPATIENT)
Dept: SLEEP MEDICINE | Facility: CLINIC | Age: 44
End: 2019-12-29

## 2019-12-30 RX ORDER — GABAPENTIN 100 MG/1
CAPSULE ORAL
Qty: 90 CAPSULE | Refills: 0 | Status: SHIPPED | OUTPATIENT
Start: 2019-12-30 | End: 2020-01-25

## 2020-01-25 ENCOUNTER — MYC REFILL (OUTPATIENT)
Dept: SLEEP MEDICINE | Facility: CLINIC | Age: 45
End: 2020-01-25

## 2020-01-27 RX ORDER — GABAPENTIN 100 MG/1
CAPSULE ORAL
Qty: 90 CAPSULE | Refills: 0 | Status: SHIPPED | OUTPATIENT
Start: 2020-01-27 | End: 2020-02-23

## 2020-02-23 ENCOUNTER — MYC REFILL (OUTPATIENT)
Dept: FAMILY MEDICINE | Facility: CLINIC | Age: 45
End: 2020-02-23

## 2020-02-23 ENCOUNTER — MYC REFILL (OUTPATIENT)
Dept: SLEEP MEDICINE | Facility: CLINIC | Age: 45
End: 2020-02-23

## 2020-02-23 DIAGNOSIS — E55.9 VITAMIN D DEFICIENCY: ICD-10-CM

## 2020-02-24 RX ORDER — GABAPENTIN 100 MG/1
CAPSULE ORAL
Qty: 90 CAPSULE | Refills: 0 | Status: SHIPPED | OUTPATIENT
Start: 2020-02-24 | End: 2020-04-08

## 2020-02-25 RX ORDER — ACETAMINOPHEN 160 MG
4000 TABLET,DISINTEGRATING ORAL DAILY
Qty: 180 CAPSULE | Refills: 1 | OUTPATIENT
Start: 2020-02-25

## 2020-02-25 NOTE — TELEPHONE ENCOUNTER
Refills sent for vitamin D3 10/28/19, disp 180 with 1 refill to the same pharmacy - pt should still have some.  Will deny to the pharmacy with that note.

## 2020-03-17 ENCOUNTER — TRANSFERRED RECORDS (OUTPATIENT)
Dept: HEALTH INFORMATION MANAGEMENT | Facility: CLINIC | Age: 45
End: 2020-03-17

## 2020-04-07 NOTE — TELEPHONE ENCOUNTER
Pending Prescriptions:                       Disp   Refills    gabapentin (NEURONTIN) 100 MG capsule     90 cap*0            Sig: Take 1 capsule by mouth at bedtime. May increase           every 3 days by 1 capsule. Max 600 mg    Last Written Prescription Date:  2/24/20  Last Fill Quantity: 90,   # refills: 0  Last Office Visit with G, P or [facility] prescribing provider: 11/19/19  Future Office visit:   No follow up scheduled at this time.    ADELA Shelley

## 2020-04-08 RX ORDER — GABAPENTIN 100 MG/1
CAPSULE ORAL
Qty: 90 CAPSULE | Refills: 0 | Status: SHIPPED | OUTPATIENT
Start: 2020-04-08 | End: 2020-07-14

## 2020-04-20 ENCOUNTER — TELEPHONE (OUTPATIENT)
Dept: SLEEP MEDICINE | Facility: CLINIC | Age: 45
End: 2020-04-20

## 2020-04-20 NOTE — TELEPHONE ENCOUNTER
Had received faxed request from pharmacy asking for refill of gabapentin.  Dr. Zuniga reordered on 4/8/2020.  Spoke with Jonny and he states he is taking 3 capsules an evening.  He had tried different amounts, and 3 tablets work the best.  New order on 4/8/2020, is for 90 tablets.  Jonny will call pharmacy when needing refill (around 5/8/20), they will ask us for the refill.

## 2020-04-21 ENCOUNTER — TELEPHONE (OUTPATIENT)
Dept: SLEEP MEDICINE | Facility: CLINIC | Age: 45
End: 2020-04-21

## 2020-04-21 NOTE — TELEPHONE ENCOUNTER
Reason for call:  Other   Patient called regarding (reason for call): prescription  Additional comments: Please expedite request as pt have been out for a while do to pharmacy sending request to [facility]'s FAX    Phone number to reach patient:  Home number on file [phone] (home)    Best Time:  NA    Can we leave a detailed message on this number?  Not Applicable    Travel screening: Not Applicable       Last fill was 4/8/2020 for 90 caps. He should not be out of medication yet. I wrote a MyChart note asking for more information, but have not heard back.  It appears he has not read the message. I called and left a message asking him to look for the MyChart note or try calling the clinic.  Bennett Goltz, PA-C    
Private Vehicle

## 2020-05-28 RX ORDER — GABAPENTIN 100 MG/1
CAPSULE ORAL
Qty: 90 CAPSULE | Refills: 0 | Status: CANCELLED | OUTPATIENT
Start: 2020-05-28

## 2020-05-28 NOTE — TELEPHONE ENCOUNTER
I called and left a message asking for a return call. I would like to hear how he is doing with the medication and how much he is taking.  Bennett Goltz, PA-C      I have tried to reach out to Jonny via phone and [employer]. I have not heard back, so I am removing the pended prescription.  Bennett Goltz, PA-C

## 2020-06-08 NOTE — TELEPHONE ENCOUNTER
Pharmacy is requesting refill. 4th request. I did call the pharmacy to let them know our provider would like to speak to him and see what amount patient is taking and how he is doing with the medication. Left voicemail for patient to call clinic to speak to Genaro.

## 2020-06-10 ENCOUNTER — TRANSFERRED RECORDS (OUTPATIENT)
Dept: HEALTH INFORMATION MANAGEMENT | Facility: CLINIC | Age: 45
End: 2020-06-10

## 2020-06-11 DIAGNOSIS — E55.9 VITAMIN D DEFICIENCY: ICD-10-CM

## 2020-06-11 RX ORDER — ACETAMINOPHEN 160 MG
TABLET,DISINTEGRATING ORAL
Qty: 180 CAPSULE | Refills: 0 | Status: SHIPPED | OUTPATIENT
Start: 2020-06-11 | End: 2020-07-15

## 2020-06-11 NOTE — TELEPHONE ENCOUNTER
Prescription approved per [facility] Refill Protocol.    Megan Burton RN

## 2020-07-10 ENCOUNTER — TRANSFERRED RECORDS (OUTPATIENT)
Dept: HEALTH INFORMATION MANAGEMENT | Facility: CLINIC | Age: 45
End: 2020-07-10

## 2020-07-14 ENCOUNTER — VIRTUAL VISIT (OUTPATIENT)
Dept: FAMILY MEDICINE | Facility: CLINIC | Age: 45
End: 2020-07-14
Payer: COMMERCIAL

## 2020-07-14 DIAGNOSIS — F41.1 ANXIETY STATE: ICD-10-CM

## 2020-07-14 DIAGNOSIS — I10 ESSENTIAL HYPERTENSION: ICD-10-CM

## 2020-07-14 DIAGNOSIS — E87.6 HYPOKALEMIA: ICD-10-CM

## 2020-07-14 PROCEDURE — 99214 OFFICE O/P EST MOD 30 MIN: CPT | Mod: 95 | Performed by: PHYSICIAN ASSISTANT

## 2020-07-14 RX ORDER — METOPROLOL SUCCINATE 50 MG/1
50 TABLET, EXTENDED RELEASE ORAL DAILY
Qty: 90 TABLET | Refills: 1 | Status: SHIPPED | OUTPATIENT
Start: 2020-07-14 | End: 2021-01-15

## 2020-07-14 RX ORDER — POTASSIUM CHLORIDE 750 MG/1
10 TABLET, EXTENDED RELEASE ORAL DAILY
Qty: 90 TABLET | Refills: 1 | Status: SHIPPED | OUTPATIENT
Start: 2020-07-14 | End: 2021-01-15

## 2020-07-14 RX ORDER — HYDROCHLOROTHIAZIDE 25 MG/1
25 TABLET ORAL DAILY
Qty: 90 TABLET | Refills: 1 | Status: SHIPPED | OUTPATIENT
Start: 2020-07-14 | End: 2021-01-15

## 2020-07-14 RX ORDER — CITALOPRAM HYDROBROMIDE 20 MG/1
20 TABLET ORAL DAILY
Qty: 90 TABLET | Refills: 1 | Status: SHIPPED | OUTPATIENT
Start: 2020-07-14 | End: 2021-01-15

## 2020-07-14 RX ORDER — METOPROLOL SUCCINATE 100 MG/1
100 TABLET, EXTENDED RELEASE ORAL DAILY
Qty: 90 TABLET | Refills: 1 | Status: SHIPPED | OUTPATIENT
Start: 2020-07-14 | End: 2021-01-15

## 2020-07-14 RX ORDER — OLMESARTAN MEDOXOMIL 40 MG/1
40 TABLET ORAL DAILY
Qty: 90 TABLET | Refills: 1 | Status: SHIPPED | OUTPATIENT
Start: 2020-07-14 | End: 2021-01-15

## 2020-07-14 ASSESSMENT — ANXIETY QUESTIONNAIRES
GAD7 TOTAL SCORE: 4
6. BECOMING EASILY ANNOYED OR IRRITABLE: SEVERAL DAYS
2. NOT BEING ABLE TO STOP OR CONTROL WORRYING: NOT AT ALL
1. FEELING NERVOUS, ANXIOUS, OR ON EDGE: SEVERAL DAYS
7. FEELING AFRAID AS IF SOMETHING AWFUL MIGHT HAPPEN: NOT AT ALL
5. BEING SO RESTLESS THAT IT IS HARD TO SIT STILL: NOT AT ALL
3. WORRYING TOO MUCH ABOUT DIFFERENT THINGS: SEVERAL DAYS
IF YOU CHECKED OFF ANY PROBLEMS ON THIS QUESTIONNAIRE, HOW DIFFICULT HAVE THESE PROBLEMS MADE IT FOR YOU TO DO YOUR WORK, TAKE CARE OF THINGS AT HOME, OR GET ALONG WITH OTHER PEOPLE: SOMEWHAT DIFFICULT

## 2020-07-14 ASSESSMENT — PATIENT HEALTH QUESTIONNAIRE - PHQ9
SUM OF ALL RESPONSES TO PHQ QUESTIONS 1-9: 4
5. POOR APPETITE OR OVEREATING: SEVERAL DAYS

## 2020-07-14 NOTE — PROGRESS NOTES
"Jonny Quinones is a 45 year old male who is being evaluated via a billable video visit.      The patient has been notified of following:     \"This video visit will be conducted via a call between you and your physician/provider. We have found that certain health care needs can be provided without the need for an in-person physical exam.  This service lets us provide the care you need with a video conversation.  If a prescription is necessary we can send it directly to your pharmacy.  If lab work is needed we can place an order for that and you can then stop by our lab to have the test done at a later time.    Video visits are billed at different rates depending on your insurance coverage.  Please reach out to your insurance provider with any questions.    If during the course of the call the physician/provider feels a video visit is not appropriate, you will not be charged for this service.\"    Patient has given verbal consent for Video visit? Yes  How would you like to obtain your AVS? MyChart  Will anyone else be joining your video visit? No      Subjective     Jonny Quinones is a 45 year old male who presents today via video visit for the following health issues:    HPI  Hypertension Follow-up      Do you check your blood pressure regularly outside of the clinic? No     Are you following a low salt diet? Yes    Are your blood pressures ever more than 140 on the top number (systolic) OR more   than 90 on the bottom number (diastolic), for example 140/90? Yes      How many servings of fruits and vegetables do you eat daily?  0-1    On average, how many sweetened beverages do you drink each day (Examples: soda, juice, sweet tea, etc.  Do NOT count diet or artificially sweetened beverages)?  2-3 sodas per day     How many days per week do you exercise enough to make your heart beat faster? 3 or less    How many minutes a day do you exercise enough to make your heart beat faster? 9 or less    How many days per " week do you miss taking your medication? 0    Jonny Quinones is a 45 year old male who presents today for blood pressure check up  Recent eye clinic check up showed good blood pressure  He did have a low back problem during the recent COVID outbreak so exercise has been limited  Drinking a few sodas daily;  Has started to watch his sodium and diet (feeling better)  Denies any chest pain or shortness of breath  Wears CPAP    Medication Followup of Celexa     Taking Medication as prescribed: Yes    Side Effects: None    Medication Helping Symptoms: Yes      Video Start Time: 842    Patient Active Problem List   Diagnosis     Prediabetes     Lipid screening     Gout, unspecified     Essential hypertension     TREMAINE (obstructive sleep apnea)     Vitamin D deficiency     Anxiety state     Allergic rhinitis     Abscess of lung (H)     Morbid obesity (H)     Factor V Leiden (H)     Lumbar herniated disc     CME (cystoid macular edema), right     Chronic low back pain without sciatica, unspecified back pain laterality     Past Surgical History:   Procedure Laterality Date     athroereisis  03/27/2009    [place] Subtalar     ENT SURGERY Bilateral 1985    tonsilectomy     LAPAROSCOPIC GASTRIC ADJUSTABLE BANDING  11/2009     SINUS SURGERY  2001     sphincterotomy  02/2009    lateral internal       Social History     Tobacco Use     Smoking status: Never Smoker     Smokeless tobacco: Never Used   Substance Use Topics     Alcohol use: No     Frequency: Never     Family History   Problem Relation Age of Onset     Allergies Mother      Blood Disease Mother         Leiden Factor 5     Cerebrovascular Disease Mother      Hypertension Father      Hypertension Maternal Grandmother      Peripheral Vascular Disease Maternal Grandmother      Allergies Brother      Breast Cancer Paternal Aunt      Thrombosis Paternal Grandfather            Reviewed and updated as needed this visit by Provider         Review of Systems   Constitutional, HEENT,  cardiovascular, pulmonary, gi and gu systems are negative, except as otherwise noted.      Objective             Physical Exam     GENERAL: Healthy, alert and no distress  EYES: Eyes grossly normal to inspection.  No discharge or erythema, or obvious scleral/conjunctival abnormalities.  RESP: No audible wheeze, cough, or visible cyanosis.  No visible retractions or increased work of breathing.    SKIN: Visible skin clear. No significant rash, abnormal pigmentation or lesions.  NEURO: Cranial nerves grossly intact.  Mentation and speech appropriate for age.  PSYCH: Mentation appears normal, affect normal/bright, judgement and insight intact, normal speech and appearance well-groomed.      Diagnostic Test Results:  Labs reviewed in Epic        Assessment & Plan     1. Anxiety state  Overall controlled. Continue present management.   - citalopram (CELEXA) 20 MG tablet; Take 1 tablet (20 mg) by mouth daily  Dispense: 90 tablet; Refill: 1    2. Essential hypertension  Trending on higher edge but ok. Has made some dietary changes but could do better. Needs exercise;recent back injury has slowed some of his activity. Nurse check in 2-3 weeks. Will move all but the diuretic to night time. Follow up in clinic in December for labs/visit  - hydrochlorothiazide (HYDRODIURIL) 25 MG tablet; Take 1 tablet (25 mg) by mouth daily  Dispense: 90 tablet; Refill: 1  - metoprolol succinate ER (TOPROL-XL) 100 MG 24 hr tablet; Take 1 tablet (100 mg) by mouth daily  Dispense: 90 tablet; Refill: 1  - metoprolol succinate ER (TOPROL-XL) 50 MG 24 hr tablet; Take 1 tablet (50 mg) by mouth daily  Dispense: 90 tablet; Refill: 1  - olmesartan (BENICAR) 40 MG tablet; Take 1 tablet (40 mg) by mouth daily  Dispense: 90 tablet; Refill: 1    3. Hypokalemia  - potassium chloride ER (K-TAB/KLOR-CON) 10 MEQ CR tablet; Take 1 tablet (10 mEq) by mouth daily  Dispense: 90 tablet; Refill: 1     BMI:   Estimated body mass index is 48.44 kg/m  as calculated  "from the following:    Height as of 12/23/19: 1.892 m (6' 2.5\").    Weight as of 12/23/19: 173.5 kg (382 lb 6.4 oz).   Weight management plan: Discussed healthy diet and exercise guidelines      Return in about 5 months (around 12/14/2020) for Med Check and Labs.    Robe Gaston PA-C  [facility]      Video-Visit Details    Type of service:  Video Visit    Video End Time:8:59 AM    Originating Location (pt. Location): Home    Distant Location (provider location):  [facility]     Platform used for Video Visit: Anthony    No follow-ups on file.       Robe Gaston PA-C      "

## 2020-07-15 ENCOUNTER — MYC REFILL (OUTPATIENT)
Dept: FAMILY MEDICINE | Facility: CLINIC | Age: 45
End: 2020-07-15

## 2020-07-15 ENCOUNTER — MYC MEDICAL ADVICE (OUTPATIENT)
Dept: FAMILY MEDICINE | Facility: CLINIC | Age: 45
End: 2020-07-15

## 2020-07-15 DIAGNOSIS — E55.9 VITAMIN D DEFICIENCY: ICD-10-CM

## 2020-07-15 DIAGNOSIS — I10 ESSENTIAL HYPERTENSION: ICD-10-CM

## 2020-07-15 DIAGNOSIS — F41.1 ANXIETY STATE: ICD-10-CM

## 2020-07-15 DIAGNOSIS — E87.6 HYPOKALEMIA: ICD-10-CM

## 2020-07-15 RX ORDER — POTASSIUM CHLORIDE 750 MG/1
10 TABLET, EXTENDED RELEASE ORAL DAILY
Qty: 90 TABLET | Refills: 1 | OUTPATIENT
Start: 2020-07-15

## 2020-07-15 RX ORDER — METOPROLOL SUCCINATE 100 MG/1
100 TABLET, EXTENDED RELEASE ORAL DAILY
Qty: 90 TABLET | Refills: 1 | OUTPATIENT
Start: 2020-07-15

## 2020-07-15 RX ORDER — OLMESARTAN MEDOXOMIL 40 MG/1
40 TABLET ORAL DAILY
Qty: 90 TABLET | Refills: 1 | OUTPATIENT
Start: 2020-07-15

## 2020-07-15 RX ORDER — HYDROCHLOROTHIAZIDE 25 MG/1
25 TABLET ORAL DAILY
Qty: 90 TABLET | Refills: 1 | OUTPATIENT
Start: 2020-07-15

## 2020-07-15 RX ORDER — ACETAMINOPHEN 160 MG
TABLET,DISINTEGRATING ORAL
Qty: 180 CAPSULE | Refills: 0 | Status: SHIPPED | OUTPATIENT
Start: 2020-07-15 | End: 2020-09-20

## 2020-07-15 RX ORDER — METOPROLOL SUCCINATE 50 MG/1
50 TABLET, EXTENDED RELEASE ORAL DAILY
Qty: 90 TABLET | Refills: 1 | OUTPATIENT
Start: 2020-07-15

## 2020-07-15 RX ORDER — CITALOPRAM HYDROBROMIDE 20 MG/1
20 TABLET ORAL DAILY
Qty: 90 TABLET | Refills: 1 | OUTPATIENT
Start: 2020-07-15

## 2020-07-15 RX ORDER — AMLODIPINE BESYLATE 10 MG/1
10 TABLET ORAL DAILY
Qty: 90 TABLET | Refills: 1 | Status: SHIPPED | OUTPATIENT
Start: 2020-07-15 | End: 2021-01-15

## 2020-07-15 ASSESSMENT — ANXIETY QUESTIONNAIRES: GAD7 TOTAL SCORE: 4

## 2020-07-15 NOTE — TELEPHONE ENCOUNTER
Prescription approved per [facility] Refill Protocol.    Alfonso MILLER RN, BSN

## 2020-07-15 NOTE — TELEPHONE ENCOUNTER
Prescription approved per [facility] Refill Protocol.    Alfonso MILLER RN, BSN

## 2020-09-03 ENCOUNTER — VIRTUAL VISIT (OUTPATIENT)
Dept: SLEEP MEDICINE | Facility: CLINIC | Age: 45
End: 2020-09-03
Payer: COMMERCIAL

## 2020-09-03 DIAGNOSIS — G47.33 OSA (OBSTRUCTIVE SLEEP APNEA): Primary | ICD-10-CM

## 2020-09-03 PROCEDURE — 99214 OFFICE O/P EST MOD 30 MIN: CPT | Mod: 95 | Performed by: PHYSICIAN ASSISTANT

## 2020-09-03 NOTE — PROGRESS NOTES
"Jonny Quinones is a 45 year old male who is being evaluated via a billable video visit.      The patient has been notified of following:     \"This video visit will be conducted via a call between you and your physician/provider. We have found that certain health care needs can be provided without the need for an in-person physical exam.  This service lets us provide the care you need with a video conversation.  If a prescription is necessary we can send it directly to your pharmacy.  If lab work is needed we can place an order for that and you can then stop by our lab to have the test done at a later time.    Video visits are billed at different rates depending on your insurance coverage.  Please reach out to your insurance provider with any questions.    If during the course of the call the physician/provider feels a video visit is not appropriate, you will not be charged for this service.\"    Patient has given verbal consent for Video visit? Yes  How would you like to obtain your AVS? MyChart  If you are dropped from the video visit, the video invite should be resent to: Text to cell phone: [phone]  Will anyone else be joining your video visit? No        Video-Visit Details    Type of service:  Video Visit    Video Start Time: 11:05 AM  Video End Time: 11:41 AM    Originating Location (pt. Location): Home    Distant Location (provider location):  [facility]     Platform used for Video Visit: [employer] Bennett Ezra Goltz, PA-C          CPAP Follow-Up Visit:    Date on this visit: 9/3/2020    Jonny Quinones has a follow-up visit today to review his CPAP use for TREMAINE. His medical history is significant for HTN, depression/anxiety. 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     He had a sleep study at [facility] on 11/12/2015. His AHI was 57.5/hr, RDI 80/hr, O2 robert 81%. His PLM index was 0/hr. CPAP was titrated to 8 cm. His weight was " 320.    He is noticing a lot of mask leak, particularly when the pressure gets high. He got a new mask cushion online but that one also leaks.  The leak increased when the pressure was increased. It was at a fixed pressure of 10 cm last year. The leak has been progressively worse. He sometimes sleeps with his face into the pillow to hold the mask in place. He has to wear it pretty tight to prevent leak. It looks like the cushion may be a little small. It is a medium. He had it pulled down on the nasal bridge a little more than it should be.    PAP machine: [employer]. Pressure settings: 10-13 cm    The compliance data was obtained from his machine directly.  The 90th% pressure is 12.1 cm.  The average time in large leak is 6% of the night (up to about 25% in the last 1-2 days).  The average nightly usage is 9.1 hours.  The average AHI is 2.9/hr.         Interface:  Mask: full face mask, [employer]plus  Chin strap: No  Leak: Yes  Using Humidifier: Yes  Condensation in hose or mask: Yes/No     Difficulties with therapy:    [-] Snoring with CPAP:  [-] Difficulty tolerating the pressure: pressure feels high, primarily when it leaked. Feels like the air was pushing the mask off of his face.  [-] Epistaxis/dry nose:   [-] Nasal congestion:  [-] Dry mouth:  [-] Mouth breathing:   [-] Pain/skin breakdown:        Weight change since sleep study: no change in the last year.    When he was seen a year ago, I had started gabapentin for insomnia related to chronic pain from a herniated disc and possible RLS. I could not reach him to hear how he was doing with the medication so, I stopped refilling it. He also has some difficulty sleeping because he works rotating shifts.  He did not notice much benefit from gabapentin. He went to therapy for his back and that has helped.     He is currently going to bed 9-10 PM and waking up around 6-7 AM spontaneously. He has some difficulty falling asleep, but once he falls asleep, he  is fine. His mind wanders for 15-30 minutes before he falls asleep. He falls asleep faster if he is more busy at work. He tried trimming his time in bed down but he found he got tired and irritable after dinner. His energy in the day is good.     He has an appointment with the DOT on the 9th.    Past medical/surgical history, family history, social history, medications and allergies were reviewed.      Problem List:  Patient Active Problem List    Diagnosis Date Noted     Chronic low back pain without sciatica, unspecified back pain laterality 04/01/2019     Priority: Medium     CME (cystoid macular edema), right 02/06/2019     Priority: Medium     Seeing [facility]       Essential hypertension 02/05/2019     Priority: Medium     Anxiety state 02/05/2019     Priority: Medium     Allergic rhinitis 02/05/2019     Priority: Medium     Overview:   Dust, mold, pollen       Abscess of lung (H) 02/05/2019     Priority: Medium     Overview:   H/O       Morbid obesity (H) 02/05/2019     Priority: Medium     Factor V Leiden (H) 02/05/2019     Priority: Medium     Lumbar herniated disc 02/05/2019     Priority: Medium     Vitamin D deficiency 04/10/2015     Priority: Medium     Prediabetes 04/25/2013     Priority: Medium     Overview:   4/2013 glucose 103       Lipid screening 01/22/2013     Priority: Medium     Gout, unspecified 02/14/2012     Priority: Medium     TREMAINE (obstructive sleep apnea) 11/05/2009     Priority: Medium        Impression/Plan:    (G47.33) TREMAINE (obstructive sleep apnea)  (primary encounter diagnosis)  Comment: He is having problems with mask leak, gradually worsening over the last year, with the pressure change from 10 cm to 10-13 cm. He just changed his mask cushion a month ago and it is still leaking. He does not appear to clean it very diligently. It looked a little small when I had him try it on.  Plan: Comprehensive DME        Changed pressure back to 10 cm. Consider trying a large cushion.  Clean the mask cushion and your face daily to improve the seal. Also, keep your face shaved. He was encouraged to bring his CPAP either to [facility] or to the clinic to get a download for me to verify the leak is improved and the AHI is still <5/hr. Contact [facility] to try to get his  for remote access. They could get him a download for the DOT as well.     He will follow up with me in about 1 year(s).     34 minutes spent with patient, all of which were spent face-to-face counseling, consulting, coordinating plan of care.      Bennett Goltz, PA-C    CC: No ref. provider found

## 2020-09-20 ENCOUNTER — MYC REFILL (OUTPATIENT)
Dept: FAMILY MEDICINE | Facility: CLINIC | Age: 45
End: 2020-09-20

## 2020-09-20 DIAGNOSIS — I10 ESSENTIAL HYPERTENSION: ICD-10-CM

## 2020-09-20 DIAGNOSIS — F41.1 ANXIETY STATE: ICD-10-CM

## 2020-09-20 DIAGNOSIS — E87.6 HYPOKALEMIA: ICD-10-CM

## 2020-09-21 RX ORDER — AMLODIPINE BESYLATE 10 MG/1
10 TABLET ORAL DAILY
Qty: 90 TABLET | Refills: 1 | OUTPATIENT
Start: 2020-09-21

## 2020-09-21 RX ORDER — HYDROCHLOROTHIAZIDE 25 MG/1
25 TABLET ORAL DAILY
Qty: 90 TABLET | Refills: 1 | OUTPATIENT
Start: 2020-09-21

## 2020-09-21 RX ORDER — CITALOPRAM HYDROBROMIDE 20 MG/1
20 TABLET ORAL DAILY
Qty: 90 TABLET | Refills: 1 | OUTPATIENT
Start: 2020-09-21

## 2020-09-21 RX ORDER — METOPROLOL SUCCINATE 100 MG/1
100 TABLET, EXTENDED RELEASE ORAL DAILY
Qty: 90 TABLET | Refills: 1 | OUTPATIENT
Start: 2020-09-21

## 2020-09-21 RX ORDER — OLMESARTAN MEDOXOMIL 40 MG/1
40 TABLET ORAL DAILY
Qty: 90 TABLET | Refills: 1 | OUTPATIENT
Start: 2020-09-21

## 2020-09-21 RX ORDER — POTASSIUM CHLORIDE 750 MG/1
10 TABLET, EXTENDED RELEASE ORAL DAILY
Qty: 90 TABLET | Refills: 1 | OUTPATIENT
Start: 2020-09-21

## 2020-09-21 RX ORDER — METOPROLOL SUCCINATE 50 MG/1
50 TABLET, EXTENDED RELEASE ORAL DAILY
Qty: 90 TABLET | Refills: 1 | OUTPATIENT
Start: 2020-09-21

## 2020-09-21 NOTE — TELEPHONE ENCOUNTER
Calling pharmacy patient has refills available and some he has requested a refill too soon. Pharmacy will call when prescriptions are ready for .     Mila Snyder RN on 9/21/2020 at 12:04 PM

## 2020-09-22 ENCOUNTER — TELEPHONE (OUTPATIENT)
Dept: FAMILY MEDICINE | Facility: CLINIC | Age: 45
End: 2020-09-22

## 2020-09-22 NOTE — TELEPHONE ENCOUNTER
Reason for call:  Form   Our goal is to have forms completed within 72 hours, however some forms may require a visit or additional information.     Who is the form from? Patient  Where did the form come from? Patient or family brought in     What clinic location was the form placed at? [place]  Where was the form placed? [facility]/Folder  What number is listed as a contact on the form? [phone]    Phone call message - patient request for a letter, form or note:     Date needed: as soon as possible  Please fax to [phone]  Has the patient signed a consent form for release of information? YES    Additional comments:     Type of letter, form or note: medical    Phone number to reach patient:  Cell number on file:    Telephone Information:   Mobile [phone]       Best Time:      Can we leave a detailed message on this number?  YES    Travel screening: Not Applicable

## 2020-10-02 NOTE — TELEPHONE ENCOUNTER
Completed. In my outbox. Please see note - do we only need to comment on citalopram? That's all he'd written down...

## 2020-10-02 NOTE — TELEPHONE ENCOUNTER
Called patient and LM to return call to clinic, see providers note below to clarify.  Barbara Robin MA

## 2020-10-05 NOTE — TELEPHONE ENCOUNTER
Called and spoke with spouse (CTC) on file. She stated that it is only needed for citalopram. Form has been faxed and mailed original copy to patient.   Barbara Robin MA

## 2020-11-16 ENCOUNTER — HEALTH MAINTENANCE LETTER (OUTPATIENT)
Age: 45
End: 2020-11-16

## 2021-01-15 ENCOUNTER — VIRTUAL VISIT (OUTPATIENT)
Dept: FAMILY MEDICINE | Facility: CLINIC | Age: 46
End: 2021-01-15

## 2021-01-15 DIAGNOSIS — E87.6 HYPOKALEMIA: ICD-10-CM

## 2021-01-15 DIAGNOSIS — I10 ESSENTIAL HYPERTENSION: ICD-10-CM

## 2021-01-15 DIAGNOSIS — R09.81 NASAL CONGESTION: Primary | ICD-10-CM

## 2021-01-15 DIAGNOSIS — F41.1 ANXIETY STATE: ICD-10-CM

## 2021-01-15 DIAGNOSIS — R73.03 PREDIABETES: ICD-10-CM

## 2021-01-15 PROCEDURE — 99214 OFFICE O/P EST MOD 30 MIN: CPT | Mod: 95 | Performed by: PHYSICIAN ASSISTANT

## 2021-01-15 RX ORDER — METOPROLOL SUCCINATE 50 MG/1
50 TABLET, EXTENDED RELEASE ORAL DAILY
Qty: 90 TABLET | Refills: 1 | Status: SHIPPED | OUTPATIENT
Start: 2021-01-15 | End: 2021-07-07

## 2021-01-15 RX ORDER — POTASSIUM CHLORIDE 750 MG/1
10 TABLET, EXTENDED RELEASE ORAL DAILY
Qty: 90 TABLET | Refills: 1 | Status: SHIPPED | OUTPATIENT
Start: 2021-01-15 | End: 2021-03-19

## 2021-01-15 RX ORDER — METOPROLOL SUCCINATE 100 MG/1
100 TABLET, EXTENDED RELEASE ORAL DAILY
Qty: 90 TABLET | Refills: 1 | Status: SHIPPED | OUTPATIENT
Start: 2021-01-15 | End: 2021-07-07

## 2021-01-15 RX ORDER — CITALOPRAM HYDROBROMIDE 20 MG/1
20 TABLET ORAL DAILY
Qty: 90 TABLET | Refills: 1 | Status: SHIPPED | OUTPATIENT
Start: 2021-01-15 | End: 2021-08-23

## 2021-01-15 RX ORDER — AMLODIPINE BESYLATE 10 MG/1
10 TABLET ORAL DAILY
Qty: 90 TABLET | Refills: 1 | Status: SHIPPED | OUTPATIENT
Start: 2021-01-15 | End: 2021-09-20

## 2021-01-15 RX ORDER — OLMESARTAN MEDOXOMIL 40 MG/1
40 TABLET ORAL DAILY
Qty: 90 TABLET | Refills: 1 | Status: SHIPPED | OUTPATIENT
Start: 2021-01-15 | End: 2021-09-20

## 2021-01-15 RX ORDER — HYDROCHLOROTHIAZIDE 25 MG/1
25 TABLET ORAL DAILY
Qty: 90 TABLET | Refills: 1 | Status: SHIPPED | OUTPATIENT
Start: 2021-01-15 | End: 2021-09-20

## 2021-01-15 ASSESSMENT — ANXIETY QUESTIONNAIRES
GAD7 TOTAL SCORE: 1
1. FEELING NERVOUS, ANXIOUS, OR ON EDGE: SEVERAL DAYS
5. BEING SO RESTLESS THAT IT IS HARD TO SIT STILL: NOT AT ALL
3. WORRYING TOO MUCH ABOUT DIFFERENT THINGS: NOT AT ALL
2. NOT BEING ABLE TO STOP OR CONTROL WORRYING: NOT AT ALL
IF YOU CHECKED OFF ANY PROBLEMS ON THIS QUESTIONNAIRE, HOW DIFFICULT HAVE THESE PROBLEMS MADE IT FOR YOU TO DO YOUR WORK, TAKE CARE OF THINGS AT HOME, OR GET ALONG WITH OTHER PEOPLE: NOT DIFFICULT AT ALL
6. BECOMING EASILY ANNOYED OR IRRITABLE: NOT AT ALL
7. FEELING AFRAID AS IF SOMETHING AWFUL MIGHT HAPPEN: NOT AT ALL

## 2021-01-15 ASSESSMENT — PATIENT HEALTH QUESTIONNAIRE - PHQ9
SUM OF ALL RESPONSES TO PHQ QUESTIONS 1-9: 2
5. POOR APPETITE OR OVEREATING: NOT AT ALL

## 2021-01-15 NOTE — PROGRESS NOTES
Jonny is a 45 year old who is being evaluated via a billable video visit.      How would you like to obtain your AVS? MyChart  If the video visit is dropped, the invitation should be resent by: Text to cell phone: [phone]  Will anyone else be joining your video visit? No    Video Start Time: 8:46 AM  Assessment & Plan     Anxiety state  He rreports excelletn control. Continue present management.   - citalopram (CELEXA) 20 MG tablet; Take 1 tablet (20 mg) by mouth daily    Essential hypertension  Unknown control. He has not been seen in clinic or had BP check in over a year. He denies any symptoms. For now I will refill and he will start to use his cuff. He'll schedule an annul with labs in the next month or two  - hydrochlorothiazide (HYDRODIURIL) 25 MG tablet; Take 1 tablet (25 mg) by mouth daily  - metoprolol succinate ER (TOPROL-XL) 100 MG 24 hr tablet; Take 1 tablet (100 mg) by mouth daily  - metoprolol succinate ER (TOPROL-XL) 50 MG 24 hr tablet; Take 1 tablet (50 mg) by mouth daily  - olmesartan (BENICAR) 40 MG tablet; Take 1 tablet (40 mg) by mouth daily  - amLODIPine (NORVASC) 10 MG tablet; Take 1 tablet (10 mg) by mouth daily    Hypokalemia  - potassium chloride ER (K-TAB/KLOR-CON) 10 MEQ CR tablet; Take 1 tablet (10 mEq) by mouth daily    Nasal congestion  Trial flonase, then follow up      No follow-ups on file.    Robe Gaston PA-C  [facility]    Debora Fuller is a 45 year old who presents to clinic today for the following health issues  accompanied by his self:    HPI       Hypertension Follow-up      Do you check your blood pressure regularly outside of the clinic? No     Are you following a low salt diet? Yes    Are your blood pressures ever more than 140 on the top number (systolic) OR more   than 90 on the bottom number (diastolic), for example 140/90? No    Jonyn Quinones is a 45 year old male who presents today for multiple med check  He reports he  "is \"hanging in there\"  He feels healthy, dealing with some \"sinus stuff\"  He has NOT checked blood pressure at all  Continues to watch his diet; doing Hello Fresh (low sodium)   Soda consumption still very tough to break  Wearing CPAP  Getting out more, stretching at night but more activity in the woods (not an exercise routine)  He has kept medications in the morning  There is no chest pain or shortness of breath when active    Anxiety Follow-Up    How are you doing with your anxiety since your last visit? Improved     Are you having other symptoms that might be associated with anxiety? No    Have you had a significant life event? No     Are you feeling depressed? No    Do you have any concerns with your use of alcohol or other drugs? No    Social History     Tobacco Use     Smoking status: Never Smoker     Smokeless tobacco: Never Used   Substance Use Topics     Alcohol use: No     Frequency: Never     Drug use: No     JULIUS-7 SCORE 12/23/2019 7/14/2020 1/15/2021   Total Score 0 4 1     PHQ 12/23/2019 7/14/2020 1/15/2021   PHQ-9 Total Score 3 4 2   Q9: Thoughts of better off dead/self-harm past 2 weeks Not at all Not at all Not at all     Last PHQ-9 1/15/2021   1.  Little interest or pleasure in doing things 0   2.  Feeling down, depressed, or hopeless 0   3.  Trouble falling or staying asleep, or sleeping too much 0   4.  Feeling tired or having little energy 1   5.  Poor appetite or overeating 1   6.  Feeling bad about yourself 0   7.  Trouble concentrating 0   8.  Moving slowly or restless 0   Q9: Thoughts of better off dead/self-harm past 2 weeks 0   PHQ-9 Total Score 2   Difficulty at work, home, or with people Not difficult at all     JULIUS-7  1/15/2021   1. Feeling nervous, anxious, or on edge 1   2. Not being able to stop or control worrying 0   3. Worrying too much about different things 0   4. Trouble relaxing 0   5. Being so restless that it is hard to sit still 0   6. Becoming easily annoyed or irritable 0 "   7. Feeling afraid, as if something awful might happen 0   JULIUS-7 Total Score 1   If you checked any problems, how difficult have they made it for you to do your work, take care of things at home, or get along with other people? Not difficult at all       How many servings of fruits and vegetables do you eat daily?  0-1    On average, how many sweetened beverages do you drink each day (Examples: soda, juice, sweet tea, etc.  Do NOT count diet or artificially sweetened beverages)?   2    How many days per week do you exercise enough to make your heart beat faster? 3 or less    How many minutes a day do you exercise enough to make your heart beat faster? 9 or less    How many days per week do you miss taking your medication? 0    Moods remain well controlled  Especially with his increased activity - this seems to open his brain to feeling better    Review of Systems   Constitutional, HEENT, cardiovascular, pulmonary, gi and gu systems are negative, except as otherwise noted.    He does mention 2-3 months of congestion  COVID neg  Hx of nasal polyps      Objective           Vitals:  No vitals were obtained today due to virtual visit.    Physical Exam   GENERAL: Healthy, alert and no distress  EYES: Eyes grossly normal to inspection.  No discharge or erythema, or obvious scleral/conjunctival abnormalities.  RESP: No audible wheeze, cough, or visible cyanosis.  No visible retractions or increased work of breathing.    SKIN: Visible skin clear. No significant rash, abnormal pigmentation or lesions.  NEURO: Cranial nerves grossly intact.  Mentation and speech appropriate for age.  PSYCH: Mentation appears normal, affect normal/bright, judgement and insight intact, normal speech and appearance well-groomed.            Video-Visit Details    Type of service:  Video Visit    Video End Time:9:11 AM    Originating Location (pt. Location): Home    Distant Location (provider location):  [facility]      Platform used for Video Visit: Anthony

## 2021-01-16 ASSESSMENT — ANXIETY QUESTIONNAIRES: GAD7 TOTAL SCORE: 1

## 2021-03-01 DIAGNOSIS — I10 ESSENTIAL HYPERTENSION: ICD-10-CM

## 2021-03-01 DIAGNOSIS — R73.03 PREDIABETES: ICD-10-CM

## 2021-03-01 LAB
ALBUMIN SERPL-MCNC: 3.5 G/DL (ref 3.4–5)
ALP SERPL-CCNC: 60 U/L (ref 40–150)
ALT SERPL W P-5'-P-CCNC: 44 U/L (ref 0–70)
ANION GAP SERPL CALCULATED.3IONS-SCNC: 3 MMOL/L (ref 3–14)
AST SERPL W P-5'-P-CCNC: 18 U/L (ref 0–45)
BILIRUB SERPL-MCNC: 0.7 MG/DL (ref 0.2–1.3)
BUN SERPL-MCNC: 17 MG/DL (ref 7–30)
CALCIUM SERPL-MCNC: 9 MG/DL (ref 8.5–10.1)
CHLORIDE SERPL-SCNC: 108 MMOL/L (ref 94–109)
CHOLEST SERPL-MCNC: 174 MG/DL
CO2 SERPL-SCNC: 29 MMOL/L (ref 20–32)
CREAT SERPL-MCNC: 0.93 MG/DL (ref 0.66–1.25)
GFR SERPL CREATININE-BSD FRML MDRD: >90 ML/MIN/{1.73_M2}
GLUCOSE SERPL-MCNC: 104 MG/DL (ref 70–99)
HBA1C MFR BLD: 5.2 % (ref 0–5.6)
HDLC SERPL-MCNC: 36 MG/DL
LDLC SERPL CALC-MCNC: 105 MG/DL
NONHDLC SERPL-MCNC: 138 MG/DL
POTASSIUM SERPL-SCNC: 3.3 MMOL/L (ref 3.4–5.3)
PROT SERPL-MCNC: 7.2 G/DL (ref 6.8–8.8)
SODIUM SERPL-SCNC: 140 MMOL/L (ref 133–144)
TRIGL SERPL-MCNC: 167 MG/DL

## 2021-03-01 PROCEDURE — 80053 COMPREHEN METABOLIC PANEL: CPT | Performed by: PHYSICIAN ASSISTANT

## 2021-03-01 PROCEDURE — 83036 HEMOGLOBIN GLYCOSYLATED A1C: CPT | Performed by: PHYSICIAN ASSISTANT

## 2021-03-01 PROCEDURE — 36415 COLL VENOUS BLD VENIPUNCTURE: CPT | Performed by: PHYSICIAN ASSISTANT

## 2021-03-01 PROCEDURE — 80061 LIPID PANEL: CPT | Performed by: PHYSICIAN ASSISTANT

## 2021-03-19 ENCOUNTER — OFFICE VISIT (OUTPATIENT)
Dept: FAMILY MEDICINE | Facility: CLINIC | Age: 46
End: 2021-03-19
Payer: COMMERCIAL

## 2021-03-19 VITALS
RESPIRATION RATE: 20 BRPM | BODY MASS INDEX: 40.43 KG/M2 | TEMPERATURE: 97.4 F | HEART RATE: 67 BPM | WEIGHT: 315 LBS | DIASTOLIC BLOOD PRESSURE: 90 MMHG | SYSTOLIC BLOOD PRESSURE: 150 MMHG | HEIGHT: 74 IN | OXYGEN SATURATION: 98 %

## 2021-03-19 DIAGNOSIS — F41.1 ANXIETY STATE: ICD-10-CM

## 2021-03-19 DIAGNOSIS — E66.01 MORBID OBESITY (H): ICD-10-CM

## 2021-03-19 DIAGNOSIS — Z00.00 ROUTINE GENERAL MEDICAL EXAMINATION AT A HEALTH CARE FACILITY: Primary | ICD-10-CM

## 2021-03-19 DIAGNOSIS — D68.51 FACTOR V LEIDEN (H): ICD-10-CM

## 2021-03-19 DIAGNOSIS — E87.6 HYPOKALEMIA: ICD-10-CM

## 2021-03-19 DIAGNOSIS — R73.03 PREDIABETES: ICD-10-CM

## 2021-03-19 DIAGNOSIS — I10 ESSENTIAL HYPERTENSION: ICD-10-CM

## 2021-03-19 DIAGNOSIS — G47.33 OSA (OBSTRUCTIVE SLEEP APNEA): ICD-10-CM

## 2021-03-19 PROCEDURE — 99213 OFFICE O/P EST LOW 20 MIN: CPT | Mod: 25 | Performed by: PHYSICIAN ASSISTANT

## 2021-03-19 PROCEDURE — 82043 UR ALBUMIN QUANTITATIVE: CPT | Performed by: PHYSICIAN ASSISTANT

## 2021-03-19 PROCEDURE — 99396 PREV VISIT EST AGE 40-64: CPT | Performed by: PHYSICIAN ASSISTANT

## 2021-03-19 RX ORDER — POTASSIUM CHLORIDE 1500 MG/1
20 TABLET, EXTENDED RELEASE ORAL DAILY
Qty: 90 TABLET | Refills: 0
Start: 2021-03-19 | End: 2021-10-01

## 2021-03-19 ASSESSMENT — ENCOUNTER SYMPTOMS
SHORTNESS OF BREATH: 0
WEAKNESS: 0
DIZZINESS: 0
NERVOUS/ANXIOUS: 0
FREQUENCY: 0
HEARTBURN: 0
FEVER: 0
ARTHRALGIAS: 0
CHILLS: 0
ABDOMINAL PAIN: 0
PALPITATIONS: 0
SORE THROAT: 0
HEMATURIA: 0
HEADACHES: 0
CONSTIPATION: 0
COUGH: 0
DIARRHEA: 0
PARESTHESIAS: 0
EYE PAIN: 0
JOINT SWELLING: 0
HEMATOCHEZIA: 0
NAUSEA: 0
DYSURIA: 0
MYALGIAS: 0

## 2021-03-19 ASSESSMENT — MIFFLIN-ST. JEOR: SCORE: 2605.36

## 2021-03-19 NOTE — PROGRESS NOTES
SUBJECTIVE:   CC: Jonny Quinones is an 46 year old male who presents for preventative health visit.     Patient has been advised of split billing requirements and indicates understanding: Yes  Healthy Habits:     Getting at least 3 servings of Calcium per day:  NO    Bi-annual eye exam:  Yes    Dental care twice a year:  Yes    Sleep apnea or symptoms of sleep apnea:  Sleep apnea    Diet:  Low salt    Frequency of exercise:  None    Taking medications regularly:  Yes    Medication side effects:  None    PHQ-2 Total Score: 0    Additional concerns today:  No    Jonny Quinones is a 46 year old male who presents today for annual check up  Has quit soda since February; feels more energy - more level  Does continue to wear CPAP  Trying to eat healthier; limiting sweet snacks  Does often have some times when he goes most of the day without eating then eats a lot more at night    Anxiety is under control - even with pandemic  Continues on 20mg celexa    Moves around at work but no exercise program    Not checking blood pressure at home  Did see someone for his eye recently and was normal        Today's PHQ-2 Score:   PHQ-2 ( 1999 Pfizer) 3/19/2021   Q1: Little interest or pleasure in doing things 0   Q2: Feeling down, depressed or hopeless 0   PHQ-2 Score 0   Q1: Little interest or pleasure in doing things Not at all   Q2: Feeling down, depressed or hopeless Not at all   PHQ-2 Score 0       Abuse: Current or Past(Physical, Sexual or Emotional)- No  Do you feel safe in your environment? Yes    Have you ever done Advance Care Planning? (For example, a Health Directive, POLST, or a discussion with a medical provider or your loved ones about your wishes): No, advance care planning information given to patient to review.  Patient declined advance care planning discussion at this time.    Social History     Tobacco Use     Smoking status: Never Smoker     Smokeless tobacco: Never Used   Substance Use Topics     Alcohol  "use: No     Frequency: Never       Alcohol Use 3/19/2021   Prescreen: >3 drinks/day or >7 drinks/week? No     Last PSA: No results found for: PSA    Reviewed orders with patient. Reviewed health maintenance and updated orders accordingly - Yes  Lab work is in process and reviewed    Reviewed and updated as needed this visit by clinical staff  Tobacco  Allergies  Meds  Problems  Med Hx  Surg Hx  Fam Hx  Soc Hx          Reviewed and updated as needed this visit by Provider                    Review of Systems   Constitutional: Negative for chills and fever.   HENT: Negative for congestion, ear pain, hearing loss and sore throat.    Eyes: Negative for pain and visual disturbance.   Respiratory: Negative for cough and shortness of breath.    Cardiovascular: Positive for peripheral edema. Negative for chest pain and palpitations.   Gastrointestinal: Negative for abdominal pain, constipation, diarrhea, heartburn, hematochezia and nausea.   Genitourinary: Negative for discharge, dysuria, frequency, genital sores, hematuria, impotence and urgency.   Musculoskeletal: Negative for arthralgias, joint swelling and myalgias.   Skin: Negative for rash.   Neurological: Negative for dizziness, weakness, headaches and paresthesias.   Psychiatric/Behavioral: Negative for mood changes. The patient is not nervous/anxious.          OBJECTIVE:   BP (!) 150/90 (BP Location: Right arm, Patient Position: Chair, Cuff Size: Adult Large)   Pulse 67   Temp 97.4  F (36.3  C) (Tympanic)   Resp 20   Ht 1.88 m (6' 2\")   Wt (!) 166.6 kg (367 lb 3.2 oz)   SpO2 98%   BMI 47.15 kg/m      Physical Exam  GENERAL: healthy, alert and no distress  EYES: Eyes grossly normal to inspection, PERRL and conjunctivae and sclerae normal  HENT: ear canals and TM's normal, nose and mouth without ulcers or lesions  NECK: no adenopathy, no asymmetry, masses, or scars and thyroid normal to palpation  RESP: lungs clear to auscultation - no rales, rhonchi or " "wheezes  CV: regular rates and rhythm, normal S1 S2, no S3 or S4 and no murmur, click or rub  ABDOMEN: soft, nontender, no hepatosplenomegaly, no masses and bowel sounds normal  MS: No peripheral edema   SKIN: no suspicious lesions or rashes  NEURO: Normal strength and tone, mentation intact and speech normal  PSYCH: mentation appears normal, affect normal/bright    Diagnostic Test Results:  Labs reviewed in Epic  Updating today    ASSESSMENT/PLAN:   1. Routine general medical examination at a health care facility  Reviewed personal and family history. Reviewed age appropriate screenings. Recommended any needed vaccinations.    2. Essential hypertension  Not at goal. Needs to start checking at home. Limited changes still available to regimen - could increase BB or diuretic. Start with BP check at pharmacy and increased exercise  - Albumin Random Urine Quantitative with Creat Ratio    3. Hypokalemia  Increased to 20meq  - potassium chloride ER (K-TAB) 20 MEQ CR tablet; Take 1 tablet (20 mEq) by mouth daily  Dispense: 90 tablet; Refill: 0    4. Morbid obesity (H)  Continue diet and exercise     5. Factor V Leiden (H)  stable    6. TREMAINE (obstructive sleep apnea)  Using cpap    7. Anxiety state  Controlled on celexa    8. Prediabetes  Ok on last a1c check        Patient has been advised of split billing requirements and indicates understanding: Yes  COUNSELING:   Reviewed preventive health counseling, as reflected in patient instructions    Estimated body mass index is 47.15 kg/m  as calculated from the following:    Height as of this encounter: 1.88 m (6' 2\").    Weight as of this encounter: 166.6 kg (367 lb 3.2 oz).     Weight management plan: Discussed healthy diet and exercise guidelines    He reports that he has never smoked. He has never used smokeless tobacco.          Counseling Resources:  ATP IV Guidelines  Pooled Cohorts Equation Calculator  FRAX Risk Assessment  ICSI Preventive Guidelines  Dietary Guidelines " for Americans, 2010  USDA's MyPlate  ASA Prophylaxis  Lung CA Screening    [facility]

## 2021-03-19 NOTE — PATIENT INSTRUCTIONS
Lets make a goal to start some meal planning/list making to bring some food to work; good protein snacks; less eating big meals before bed    Please come back to the pharmacy (free and no appt needed) with your home BP cuff; they will check it and then check with your cuff to ensure accuracy so you can check a bit more at home. Goal <140/<90. Then send in a few numbers to me over [facility] [employer]. We may need to do some medication adjustments (other than the potassium we increased today).       Preventive Health Recommendations  Male Ages 40 to 49    Yearly exam:             See your health care provider every year in order to  o   Review health changes.   o   Discuss preventive care.    o   Review your medicines if your doctor has prescribed any.    You should be tested each year for STDs (sexually transmitted diseases) if you re at risk.     Have a cholesterol test every 5 years.     Have a colonoscopy (test for colon cancer) if someone in your family has had colon cancer or polyps before age 50.     After age 45, have a diabetes test (fasting glucose). If you are at risk for diabetes, you should have this test every 3 years.      Talk with your health care provider about whether or not a prostate cancer screening test (PSA) is right for you.    Shots: Get a flu shot each year. Get a tetanus shot every 10 years.     Nutrition:    Eat at least 5 servings of fruits and vegetables daily.     Eat whole-grain bread, whole-wheat pasta and brown rice instead of white grains and rice.     Get adequate Calcium and Vitamin D.     Lifestyle    Exercise for at least 150 minutes a week (30 minutes a day, 5 days a week). This will help you control your weight and prevent disease.     Limit alcohol to one drink per day.     No smoking.     Wear sunscreen to prevent skin cancer.     See your dentist every six months for an exam and cleaning.

## 2021-03-20 LAB
CREAT UR-MCNC: 133 MG/DL
MICROALBUMIN UR-MCNC: 6 MG/L
MICROALBUMIN/CREAT UR: 4.56 MG/G CR (ref 0–17)

## 2021-07-06 DIAGNOSIS — E87.6 HYPOKALEMIA: ICD-10-CM

## 2021-07-06 DIAGNOSIS — I10 ESSENTIAL HYPERTENSION: ICD-10-CM

## 2021-07-07 RX ORDER — METOPROLOL SUCCINATE 100 MG/1
TABLET, EXTENDED RELEASE ORAL
Qty: 90 TABLET | Refills: 0 | Status: SHIPPED | OUTPATIENT
Start: 2021-07-07 | End: 2021-09-20

## 2021-07-07 RX ORDER — POTASSIUM CHLORIDE 750 MG/1
TABLET, EXTENDED RELEASE ORAL
Qty: 90 TABLET | Refills: 0 | Status: SHIPPED | OUTPATIENT
Start: 2021-07-07 | End: 2021-09-20

## 2021-07-07 RX ORDER — METOPROLOL SUCCINATE 50 MG/1
TABLET, EXTENDED RELEASE ORAL
Qty: 90 TABLET | Refills: 0 | Status: SHIPPED | OUTPATIENT
Start: 2021-07-07 | End: 2021-09-20

## 2021-07-07 NOTE — TELEPHONE ENCOUNTER
Routing refill request to provider for review/approval because:  BP elevated.    Creatinine   Date Value Ref Range Status   03/01/2021 0.93 0.66 - 1.25 mg/dL Final     BP Readings from Last 3 Encounters:   03/19/21 (!) 150/90   12/27/19 136/86   12/23/19 (!) 160/100     Apurva Cardoza RN

## 2021-08-20 DIAGNOSIS — F41.1 ANXIETY STATE: ICD-10-CM

## 2021-08-23 RX ORDER — CITALOPRAM HYDROBROMIDE 20 MG/1
TABLET ORAL
Qty: 90 TABLET | Refills: 0 | Status: SHIPPED | OUTPATIENT
Start: 2021-08-23 | End: 2021-10-03

## 2021-08-23 NOTE — TELEPHONE ENCOUNTER
Routing refill request to provider for review/approval because:  Drug interaction warning    Routing to TC team to assist in scheduling too.        Return in about 6 months (around 9/19/2021) for BP Recheck  W/ [facility].     Gita SAHNI RN

## 2021-09-18 ENCOUNTER — HEALTH MAINTENANCE LETTER (OUTPATIENT)
Age: 46
End: 2021-09-18

## 2021-09-19 DIAGNOSIS — F41.1 ANXIETY STATE: ICD-10-CM

## 2021-09-19 DIAGNOSIS — I10 ESSENTIAL HYPERTENSION: ICD-10-CM

## 2021-09-19 DIAGNOSIS — E87.6 HYPOKALEMIA: ICD-10-CM

## 2021-09-20 RX ORDER — AMLODIPINE BESYLATE 10 MG/1
TABLET ORAL
Qty: 90 TABLET | Refills: 0 | Status: SHIPPED | OUTPATIENT
Start: 2021-09-20 | End: 2021-10-01

## 2021-09-20 RX ORDER — METOPROLOL SUCCINATE 100 MG/1
TABLET, EXTENDED RELEASE ORAL
Qty: 90 TABLET | Refills: 0 | Status: SHIPPED | OUTPATIENT
Start: 2021-09-20 | End: 2021-12-22

## 2021-09-20 RX ORDER — CITALOPRAM HYDROBROMIDE 20 MG/1
TABLET ORAL
Qty: 90 TABLET | Refills: 0 | OUTPATIENT
Start: 2021-09-20

## 2021-09-20 RX ORDER — OLMESARTAN MEDOXOMIL 40 MG/1
TABLET ORAL
Qty: 90 TABLET | Refills: 0 | Status: SHIPPED | OUTPATIENT
Start: 2021-09-20 | End: 2021-10-01

## 2021-09-20 RX ORDER — HYDROCHLOROTHIAZIDE 25 MG/1
TABLET ORAL
Qty: 90 TABLET | Refills: 0 | Status: SHIPPED | OUTPATIENT
Start: 2021-09-20 | End: 2021-11-12

## 2021-09-20 RX ORDER — POTASSIUM CHLORIDE 750 MG/1
TABLET, EXTENDED RELEASE ORAL
Qty: 90 TABLET | Refills: 0 | Status: SHIPPED | OUTPATIENT
Start: 2021-09-20 | End: 2021-10-01

## 2021-09-20 RX ORDER — METOPROLOL SUCCINATE 50 MG/1
TABLET, EXTENDED RELEASE ORAL
Qty: 90 TABLET | Refills: 0 | Status: SHIPPED | OUTPATIENT
Start: 2021-09-20 | End: 2021-12-22

## 2021-09-20 NOTE — TELEPHONE ENCOUNTER
Routing refill request to provider for review/approval because:  Protocol failed- labs, bp, etc    Next 5 appointments (look out 90 days)      Oct 08, 2021  8:40 AM  Sarah Garza with Robe Gaston PA-C  [facility] ([facility] ) [address] [address]  [phone]          Gita SAHNI RN

## 2021-09-20 NOTE — TELEPHONE ENCOUNTER
Routing refill request to provider for review/approval because:  Protocol failed    Next 5 appointments (look out 90 days)      Oct 08, 2021  8:40 AM  Sarah Garza with Robe Gaston PA-C  [facility] ([facility] ) [address] [address] [phone]

## 2021-09-20 NOTE — TELEPHONE ENCOUNTER
Citalopram denied ordered 8/23/21- 90, 0   [facility] for both.     Gita SAHNI RN

## 2021-10-01 ENCOUNTER — OFFICE VISIT (OUTPATIENT)
Dept: FAMILY MEDICINE | Facility: CLINIC | Age: 46
End: 2021-10-01
Payer: COMMERCIAL

## 2021-10-01 VITALS
DIASTOLIC BLOOD PRESSURE: 90 MMHG | TEMPERATURE: 97.9 F | SYSTOLIC BLOOD PRESSURE: 130 MMHG | BODY MASS INDEX: 40.43 KG/M2 | HEART RATE: 60 BPM | OXYGEN SATURATION: 97 % | WEIGHT: 315 LBS | RESPIRATION RATE: 16 BRPM | HEIGHT: 74 IN

## 2021-10-01 DIAGNOSIS — E87.6 HYPOKALEMIA: ICD-10-CM

## 2021-10-01 DIAGNOSIS — G89.29 CHRONIC LOW BACK PAIN WITHOUT SCIATICA, UNSPECIFIED BACK PAIN LATERALITY: ICD-10-CM

## 2021-10-01 DIAGNOSIS — I10 ESSENTIAL HYPERTENSION: Primary | ICD-10-CM

## 2021-10-01 DIAGNOSIS — M54.50 CHRONIC LOW BACK PAIN WITHOUT SCIATICA, UNSPECIFIED BACK PAIN LATERALITY: ICD-10-CM

## 2021-10-01 DIAGNOSIS — F41.1 ANXIETY STATE: ICD-10-CM

## 2021-10-01 PROCEDURE — 96127 BRIEF EMOTIONAL/BEHAV ASSMT: CPT | Performed by: PHYSICIAN ASSISTANT

## 2021-10-01 PROCEDURE — 90471 IMMUNIZATION ADMIN: CPT | Performed by: PHYSICIAN ASSISTANT

## 2021-10-01 PROCEDURE — 90686 IIV4 VACC NO PRSV 0.5 ML IM: CPT | Performed by: PHYSICIAN ASSISTANT

## 2021-10-01 PROCEDURE — 99214 OFFICE O/P EST MOD 30 MIN: CPT | Mod: 25 | Performed by: PHYSICIAN ASSISTANT

## 2021-10-01 RX ORDER — OLMESARTAN MEDOXOMIL 40 MG/1
40 TABLET ORAL DAILY
Qty: 90 TABLET | Refills: 1 | Status: SHIPPED | OUTPATIENT
Start: 2021-10-01 | End: 2022-06-24

## 2021-10-01 RX ORDER — AMLODIPINE BESYLATE 10 MG/1
10 TABLET ORAL DAILY
Qty: 90 TABLET | Refills: 1 | Status: SHIPPED | OUTPATIENT
Start: 2021-10-01 | End: 2022-06-24

## 2021-10-01 RX ORDER — POTASSIUM CHLORIDE 1500 MG/1
20 TABLET, EXTENDED RELEASE ORAL DAILY
Qty: 90 TABLET | Refills: 1 | Status: SHIPPED | OUTPATIENT
Start: 2021-10-01 | End: 2022-04-27

## 2021-10-01 ASSESSMENT — ANXIETY QUESTIONNAIRES
7. FEELING AFRAID AS IF SOMETHING AWFUL MIGHT HAPPEN: NOT AT ALL
2. NOT BEING ABLE TO STOP OR CONTROL WORRYING: NOT AT ALL
GAD7 TOTAL SCORE: 4
3. WORRYING TOO MUCH ABOUT DIFFERENT THINGS: SEVERAL DAYS
6. BECOMING EASILY ANNOYED OR IRRITABLE: SEVERAL DAYS
IF YOU CHECKED OFF ANY PROBLEMS ON THIS QUESTIONNAIRE, HOW DIFFICULT HAVE THESE PROBLEMS MADE IT FOR YOU TO DO YOUR WORK, TAKE CARE OF THINGS AT HOME, OR GET ALONG WITH OTHER PEOPLE: NOT DIFFICULT AT ALL
1. FEELING NERVOUS, ANXIOUS, OR ON EDGE: SEVERAL DAYS
5. BEING SO RESTLESS THAT IT IS HARD TO SIT STILL: NOT AT ALL

## 2021-10-01 ASSESSMENT — MIFFLIN-ST. JEOR: SCORE: 2515.55

## 2021-10-01 ASSESSMENT — PAIN SCALES - GENERAL: PAINLEVEL: NO PAIN (0)

## 2021-10-01 ASSESSMENT — PATIENT HEALTH QUESTIONNAIRE - PHQ9
SUM OF ALL RESPONSES TO PHQ QUESTIONS 1-9: 2
5. POOR APPETITE OR OVEREATING: SEVERAL DAYS

## 2021-10-01 NOTE — PATIENT INSTRUCTIONS
Send me an updated list of BPs over the next few weeks. For now we'll keep medications the same    Start the process of figuring out the back!! It is a GREAT long term solution!!!

## 2021-10-01 NOTE — PROGRESS NOTES
"    Assessment & Plan     Essential hypertension  Intermittent control. There may be some white coat aspect. He now has a home cuff and we'll have him do some checks at home and send in the readings. Room to move up on BB and diuretic if needed. Continue to focus on lifestyle changes  - olmesartan (BENICAR) 40 MG tablet; Take 1 tablet (40 mg) by mouth daily  - amLODIPine (NORVASC) 10 MG tablet; Take 1 tablet (10 mg) by mouth daily    Chronic low back pain without sciatica, unspecified back pain laterality  He needs to follow up with his ortho team. Improving this would no doubt allow him to get more active and improve qol as well     Hypokalemia  Continue present management.   - potassium chloride ER (K-TAB) 20 MEQ CR tablet; Take 1 tablet (20 mEq) by mouth daily    Anxiety state  Well controlled. Continue 20mg citalopram       BMI:   Estimated body mass index is 44.6 kg/m  as calculated from the following:    Height as of this encounter: 1.88 m (6' 2\").    Weight as of this encounter: 157.6 kg (347 lb 6.4 oz).       Return in about 5 months (around 2/28/2022).    Robe Gaston PA-C  [facility] [place] Fuller is a 46 year old who presents for the following health issues  accompanied by his spouse:    HPI     Hypertension Follow-up      Do you check your blood pressure regularly outside of the clinic? Yes     Are you following a low salt diet? Yes    Are your blood pressures ever more than 140 on the top number (systolic) OR more   than 90 on the bottom number (diastolic), for example 140/90? Yes      How many servings of fruits and vegetables do you eat daily?  0-1    On average, how many sweetened beverages do you drink each day (Examples: soda, juice, sweet tea, etc.  Do NOT count diet or artificially sweetened beverages)?   0    How many days per week do you exercise enough to make your heart beat faster? None    How many minutes a day do you exercise enough to make your " "heart beat faster? None    How many days per week do you miss taking your medication? 0      Jonny Quinones is a 46 year old male who presents today for med check  He is down around 20lbs  Doesn't feel much better, unfortunately  Switched to diet Mt [employer]  Trying to eat a bit better; less portions    DOT physical was only 135/90  Eye appt 136/79  Home cuff NOW; used once at home     Dealing with ongoing back pain/sciatica   Has seen [facility] previously; at one point was considering surgery  Continues with pain low left sided    Moods are controlled  Some over thinking but more about things that are needed to get done  Continues on celexa 20mg      Review of Systems   Constitutional, HEENT, cardiovascular, pulmonary, gi and gu systems are negative, except as otherwise noted.      Objective    BP (!) 130/90   Pulse 60   Temp 97.9  F (36.6  C) (Oral)   Resp 16   Ht 1.88 m (6' 2\")   Wt (!) 157.6 kg (347 lb 6.4 oz)   SpO2 97%   BMI 44.60 kg/m    Body mass index is 44.6 kg/m .  Physical Exam   GENERAL: healthy, alert and no distress  EYES: Eyes grossly normal to inspection, PERRL and conjunctivae and sclerae normal  RESP: lungs clear to auscultation - no rales, rhonchi or wheezes  CV: regular rates and rhythm, normal S1 S2, no S3 or S4 and no murmur, click or rub  MS: mild LE edema  PSYCH: mentation appears normal, affect normal/bright    Reviewed labs            "

## 2021-10-01 NOTE — LETTER
[facility]  [address] [address]  Phone: [phone]    October 1, 2021        Jonny Quinones  [address] [address]          To whom it may concern:    RE: Jonny Quinones    Patient was seen and treated today at our clinic. We are continuing to monitor his blood pressure and treatment plans. He is closely monitoring at home. For now he is going to stay on the same medications and lifestyle measures are improved.     Please contact me for questions or concerns.      Sincerely,        Robe Gaston PA-C

## 2021-10-02 ASSESSMENT — ANXIETY QUESTIONNAIRES: GAD7 TOTAL SCORE: 4

## 2021-10-03 RX ORDER — CITALOPRAM HYDROBROMIDE 20 MG/1
20 TABLET ORAL DAILY
Qty: 90 TABLET | Refills: 0 | Status: SHIPPED | OUTPATIENT
Start: 2021-10-03 | End: 2021-11-12

## 2021-11-11 DIAGNOSIS — F41.1 ANXIETY STATE: ICD-10-CM

## 2021-11-11 DIAGNOSIS — I10 ESSENTIAL HYPERTENSION: ICD-10-CM

## 2021-11-12 RX ORDER — HYDROCHLOROTHIAZIDE 25 MG/1
TABLET ORAL
Qty: 90 TABLET | Refills: 0 | Status: SHIPPED | OUTPATIENT
Start: 2021-11-12 | End: 2022-05-16

## 2021-11-12 RX ORDER — CITALOPRAM HYDROBROMIDE 20 MG/1
TABLET ORAL
Qty: 90 TABLET | Refills: 1 | Status: SHIPPED | OUTPATIENT
Start: 2021-11-12 | End: 2022-05-18

## 2021-11-12 NOTE — TELEPHONE ENCOUNTER
Routing refill request to provider for review/approval because:  Labs out of range:  K  Elevated BP    Sara Jackson RN on 11/12/2021 at 12:26 PM

## 2021-11-13 ENCOUNTER — HEALTH MAINTENANCE LETTER (OUTPATIENT)
Age: 46
End: 2021-11-13

## 2021-12-14 ENCOUNTER — TELEPHONE (OUTPATIENT)
Dept: SCHEDULING | Facility: CLINIC | Age: 46
End: 2021-12-14
Payer: COMMERCIAL

## 2021-12-14 NOTE — TELEPHONE ENCOUNTER
Reason for call:  Other   Patient called regarding (reason for call): call back  Additional comments: Patient calling to get a new CPAP. Current CPAP is broken, was prescribed through [facility] 6-7y ago. Last seen with [facility] in 9/2020, has f/u appt scheduled for 1/6/2022 but would like to see if something can be done about the CPAP before then.     Phone number to reach patient:  Cell number on file:    Telephone Information:   Mobile [phone]       Best Time:  Any    Can we leave a detailed message on this number?  YES    Travel screening: Not Applicable

## 2021-12-16 ENCOUNTER — MYC MEDICAL ADVICE (OUTPATIENT)
Dept: SLEEP MEDICINE | Facility: CLINIC | Age: 46
End: 2021-12-16
Payer: COMMERCIAL

## 2021-12-16 DIAGNOSIS — G47.33 OSA (OBSTRUCTIVE SLEEP APNEA): Primary | ICD-10-CM

## 2021-12-16 NOTE — TELEPHONE ENCOUNTER
Order placed for a replacement CPAP with a fixed pressure of 10 cm.  Bennett Goltz, PA-C    asymptomatic

## 2021-12-17 NOTE — TELEPHONE ENCOUNTER
See TE dated 12/16/2021. This has been taken care of and patient has been notified. Amrita Sim, CMA

## 2021-12-19 DIAGNOSIS — I10 ESSENTIAL HYPERTENSION: ICD-10-CM

## 2021-12-20 ENCOUNTER — TELEPHONE (OUTPATIENT)
Dept: SLEEP MEDICINE | Facility: CLINIC | Age: 46
End: 2021-12-20
Payer: COMMERCIAL

## 2021-12-22 RX ORDER — METOPROLOL SUCCINATE 50 MG/1
TABLET, EXTENDED RELEASE ORAL
Qty: 90 TABLET | Refills: 0 | Status: SHIPPED | OUTPATIENT
Start: 2021-12-22 | End: 2022-04-27

## 2021-12-22 RX ORDER — METOPROLOL SUCCINATE 100 MG/1
TABLET, EXTENDED RELEASE ORAL
Qty: 90 TABLET | Refills: 0 | Status: SHIPPED | OUTPATIENT
Start: 2021-12-22 | End: 2022-04-27

## 2021-12-22 RX ORDER — OLMESARTAN MEDOXOMIL 40 MG/1
TABLET ORAL
Qty: 90 TABLET | Refills: 0
Start: 2021-12-22

## 2021-12-22 NOTE — TELEPHONE ENCOUNTER
Routing refill request to provider for review/approval because:  Labs out of range:    Potassium   Date Value Ref Range Status   03/01/2021 3.3 (L) 3.4 - 5.3 mmol/L Final       BP Readings from Last 3 Encounters:   10/01/21 (!) 130/90   03/19/21 (!) 150/90   12/27/19 136/86      Next 5 appointments (look out 90 days)      Jan 28, 2022  9:00 AM  (Arrive by 8:40 AM)  Provider Visit with Robe Gaston PA-C  [facility] ([facility] ) [address] [address] [phone]          Megan Burton RN

## 2022-01-28 ENCOUNTER — ANCILLARY PROCEDURE (OUTPATIENT)
Dept: GENERAL RADIOLOGY | Facility: CLINIC | Age: 47
End: 2022-01-28
Attending: PHYSICIAN ASSISTANT
Payer: COMMERCIAL

## 2022-01-28 ENCOUNTER — OFFICE VISIT (OUTPATIENT)
Dept: FAMILY MEDICINE | Facility: CLINIC | Age: 47
End: 2022-01-28
Payer: COMMERCIAL

## 2022-01-28 VITALS
OXYGEN SATURATION: 96 % | DIASTOLIC BLOOD PRESSURE: 88 MMHG | BODY MASS INDEX: 44.81 KG/M2 | SYSTOLIC BLOOD PRESSURE: 136 MMHG | HEART RATE: 67 BPM | WEIGHT: 315 LBS | TEMPERATURE: 98 F

## 2022-01-28 DIAGNOSIS — M79.672 LEFT FOOT PAIN: ICD-10-CM

## 2022-01-28 DIAGNOSIS — G44.209 TENSION HEADACHE: ICD-10-CM

## 2022-01-28 DIAGNOSIS — E66.01 MORBID OBESITY (H): ICD-10-CM

## 2022-01-28 DIAGNOSIS — M79.672 LEFT FOOT PAIN: Primary | ICD-10-CM

## 2022-01-28 DIAGNOSIS — D68.51 FACTOR V LEIDEN (H): ICD-10-CM

## 2022-01-28 DIAGNOSIS — E65 BUFFALO HUMP: ICD-10-CM

## 2022-01-28 PROBLEM — J85.2 ABSCESS OF LUNG (H): Status: RESOLVED | Noted: 2019-02-05 | Resolved: 2022-01-28

## 2022-01-28 LAB — URATE SERPL-MCNC: 7.1 MG/DL (ref 3.5–7.2)

## 2022-01-28 PROCEDURE — 73630 X-RAY EXAM OF FOOT: CPT | Mod: LT | Performed by: RADIOLOGY

## 2022-01-28 PROCEDURE — 36415 COLL VENOUS BLD VENIPUNCTURE: CPT | Performed by: PHYSICIAN ASSISTANT

## 2022-01-28 PROCEDURE — 84550 ASSAY OF BLOOD/URIC ACID: CPT | Performed by: PHYSICIAN ASSISTANT

## 2022-01-28 PROCEDURE — 99214 OFFICE O/P EST MOD 30 MIN: CPT | Performed by: PHYSICIAN ASSISTANT

## 2022-01-28 ASSESSMENT — PAIN SCALES - GENERAL: PAINLEVEL: MILD PAIN (2)

## 2022-01-28 NOTE — PATIENT INSTRUCTIONS
You can call [phone] to schedule your imaging at [facility].

## 2022-01-28 NOTE — PROGRESS NOTES
"  Assessment & Plan     Left foot pain  Unclear; the patten is not at all one of gout but there is a hx. He has hx of radicular back pain right sided but mentions absolutely no pain radiating down the leg simply it is in the lateral two toes/mtp. The xray is normal on my read. Brisa asked them to consider a tarsal pad and to follow up initially with podiatry  - XR Foot Left G/E 3 Views; Future  - Orthopedic  Referral; Future  - Uric acid; Future  - Uric acid    Bland hump  Tension headache  I would first like to rule out large lipomatous abnormaltiy as there is some encapsulation on palpation. If normal to the study, look at overnight dex suppression testing vs sending to endocrine?  - US Head Neck Soft Tissue; Future      Factor V Leiden (H)  Reassurance. No hx of clotting    Morbid obesity (H)  Continue with at diet/exercise. Likely some effect on ortho concerns as well as general health               BMI:   Estimated body mass index is 44.81 kg/m  as calculated from the following:    Height as of 10/1/21: 1.88 m (6' 2\").    Weight as of this encounter: 158.3 kg (349 lb).       Return in about 3 months (around 4/28/2022).    OWEN Dahl [facility] SHIRLEY Fuller is a 46 year old who presents for the following health issues  accompanied by his spouse.    HPI     Pain History:  When did you first notice your pain? - 1 to 6 weeks   Have you seen any provider previously for this issue? No  How has your pain affected your ability to work? Can work part time without limitations   What type of work do you or did you do? [employer]   Where in your body do you have pain? Musculoskeletal problem/pain  Onset/Duration: 4 month ago. The back of the foot pain comes and goes \"about 6 times a year\"   Description  Location: foot - left  Joint Swelling: no  Redness: no  Pain: YES  Warmth: no  Intensity:  severe  Progression of Symptoms:  worsening  Accompanying signs and " symptoms:   Fevers: no  Numbness/tingling/weakness: YES  History  Trauma to the area: no  Recent illness:  no  Previous similar problem: no  Previous evaluation:  no  Precipitating or alleviating factors:  Aggravating factors include: standing, walking, climbing stairs, lifting and overuse  Therapies tried and outcome: rest/inactivity and immobilization    Jonny Quinones is a 46 year old male who presents today for ongoing left foot symptoms  Around 6 months ago he started having pain in his left 4th and 5th toe  Feels like it swells up; rests for a while and then can continue his day  No injury but anytime he's on his feet for a prolonged period it will act up  Never truly swelling (just feels like it) and never warmth or redness  Pain is sharp; occasionally feel numb   Only thing that seems to help is being off of it (then after around 5-6 hours on the feet it acts up  Has tried adding rubber shop mats, tried changing foot wear  He is adamant that this is not radiating down from the low back      Review of Systems   Constitutional, HEENT, cardiovascular, pulmonary, gi and gu systems are negative, except as otherwise noted.      Objective    /88 (BP Location: Right arm, Patient Position: Sitting, Cuff Size: Adult Large)   Pulse 67   Temp 98  F (36.7  C) (Oral)   Wt (!) 158.3 kg (349 lb)   SpO2 96%   BMI 44.81 kg/m    Body mass index is 44.81 kg/m .  Physical Exam   GENERAL: healthy, alert and no distress  NECK: large soft palpable mass over the posterior neck  MS: there is no swelling erythema or ttp of the left 4th and 5th toes or mtp space. Flexion/extension against resistance wnl. Adequate pedal pulses.     Xray: normal on my read  Labs: uric acid

## 2022-02-08 ENCOUNTER — HOSPITAL ENCOUNTER (OUTPATIENT)
Dept: ULTRASOUND IMAGING | Facility: CLINIC | Age: 47
Discharge: HOME | End: 2022-02-08
Attending: PHYSICIAN ASSISTANT | Admitting: PHYSICIAN ASSISTANT
Payer: COMMERCIAL

## 2022-02-08 ENCOUNTER — OFFICE VISIT (OUTPATIENT)
Dept: PODIATRY | Facility: CLINIC | Age: 47
End: 2022-02-08
Attending: PHYSICIAN ASSISTANT
Payer: COMMERCIAL

## 2022-02-08 VITALS
HEIGHT: 74 IN | DIASTOLIC BLOOD PRESSURE: 70 MMHG | BODY MASS INDEX: 40.43 KG/M2 | WEIGHT: 315 LBS | SYSTOLIC BLOOD PRESSURE: 110 MMHG

## 2022-02-08 DIAGNOSIS — G57.62 LESION OF PLANTAR NERVE, LEFT: Primary | ICD-10-CM

## 2022-02-08 DIAGNOSIS — M79.672 LEFT FOOT PAIN: ICD-10-CM

## 2022-02-08 DIAGNOSIS — E65 BUFFALO HUMP: ICD-10-CM

## 2022-02-08 PROCEDURE — 99243 OFF/OP CNSLTJ NEW/EST LOW 30: CPT | Mod: 25 | Performed by: PODIATRIST

## 2022-02-08 PROCEDURE — 64455 NJX AA&/STRD PLTR COM DG NRV: CPT | Mod: LT | Performed by: PODIATRIST

## 2022-02-08 PROCEDURE — 76536 US EXAM OF HEAD AND NECK: CPT

## 2022-02-08 RX ORDER — LIDOCAINE HYDROCHLORIDE 10 MG/ML
0.5 INJECTION, SOLUTION INFILTRATION; PERINEURAL
Status: SHIPPED | OUTPATIENT
Start: 2022-02-08

## 2022-02-08 RX ORDER — TRIAMCINOLONE ACETONIDE 40 MG/ML
40 INJECTION, SUSPENSION INTRA-ARTICULAR; INTRAMUSCULAR
Status: SHIPPED | OUTPATIENT
Start: 2022-02-08

## 2022-02-08 RX ADMIN — TRIAMCINOLONE ACETONIDE 40 MG: 40 INJECTION, SUSPENSION INTRA-ARTICULAR; INTRAMUSCULAR at 11:18

## 2022-02-08 RX ADMIN — LIDOCAINE HYDROCHLORIDE 0.5 ML: 10 INJECTION, SOLUTION INFILTRATION; PERINEURAL at 11:18

## 2022-02-08 ASSESSMENT — MIFFLIN-ST. JEOR: SCORE: 2522.8

## 2022-02-08 NOTE — PATIENT INSTRUCTIONS
Thank you for choosing [facility] Podiatry / Foot & Ankle Surgery!    DR FRANZ'S CLINIC:  [facility]   [address]   [place] [address]      TRIAGE LINE: [phone] - [address]  APPOINTMENTS: [phone]  RADIOLOGY: [phone]  SET UP SURGERY: [phone]  FAX NUMBER: [phone]  BILLING QUESTIONS: [phone]       Follow up: as needed      BLANCHARD'S NEUROMA   Blanchard's neuroma is an enlargement or thickening of a nerve in the foot. It is also sometimes referred to as an intermetatarsal neuroma, interdigital neuroma, Blanchard's metatarsalgia (pain in the metatarsal head area), isabel-neural fibrosis (scar tissue around a nerve) or entrapment neuropathy (abnormal nerve due to compression). A Blanchard's neuroma most commonly occurs in the third interspace between the third and fourth toes, followed by the second interspace between the second and third toes. Blanchard's neuromas have also occurred in the fourth and first interspaces, but these are rare. If you have a Blanchard's neuroma, there is a 15% chance it will occur bilaterally (on both feet). Blanchard's neuromas occur most commonly in women who are between 30 to 50 years old. The reason they are more common in women is thought to be due to the shoes women wear.   CAUSES: A Blanchard's neuroma is thought to be caused by trauma to the nerve, but scientists are still not sure about the exact cause of the trauma. The trauma may be caused by the metatarsal heads, the deep transverse intermetatarsal ligament (holds the metatarsal heads together) or an intermetatarsal bursa (fluid-filled sac). All of these structures can cause compression/trauma on the nerve which initially causes swelling and injury in the nerve. Over time if the compression/trauma continues, the nerve repairs itself with very fibrous tissue that leads to enlargement and thickening of the nerve. Other causes of trauma to the nerve may include; overpronation (foot rolls  "inward), hypermobility (too much motion), cavo varus (high arch foot) and excessive dorsiflexion (toes bend upward) of the toes. These biomechanical (howthe foot moves) factors may cause trauma to the nerve with every step. If the nerve becomes irritated and enlarged then it takes up more space and gets even more compressed and irritated. It becomes a vicious cycle.   SIGNS & SYMPTOMS  - Pain (sharp, stabbing, throbbing, shooting)   - Numbness   - Tingling or \"pins & needles\"   - Burning   - Cramping   - Feeling that you are stepping on something or that something is in your shoe   - Initially the symptoms may happen once in a while, but as the condition gets worse, the symptoms may happen all of the time   - It usually feels better by taking off your shoe and massaging your foot     DIAGNOSIS: Your podiatrist will ask many questions about your signs and symptoms and will perform a physical exam. Some of the exams may include a web space compression test. This is done by squeezing the metatarsals together with one hand and using the thumb and index finger of the other hand to compress the affected web space to reproduce the pain/symptoms. A palpable click (Holly's click) is usually present. This test may also cause pain to shoot into the toes and that is called a Tinel's sign. Grabiel's test involves squeezing the metatarsals together and moving the toes up and down for 30 seconds. This will usually cause pain or it will bring on your other symptoms. Dickinson's sign is positive when you stand and the affected toes spread apart. A Blanchard's neuroma is usually diagnosed based on the history and physical exam findings, but sometimes other tests such as an x-ray, ultrasound or an MRI are needed.   TREATMENT  1.  Footwear Changes: Wear shoes that are wide and deep in the toe box so they  do not put pressure on your toes and metatarsals. Avoid wearing high heels because they cause increased pressure on the ball of your " foot (forefoot).    2.  Metatarsal Pads: These help to lift and separate the metatarsal heads to take pressure off of the nerve. They are placed just behind where you feel the pain, not on top of the painful spot.   3.  Activity modification: For example, you may try swimming instead of running until your symptoms go away.   4.  Taping   5.  Icing   6.  NSAIDs (anti-inflammatories): aleve, ibuprofen, etc.   7.  Arch Supports or Orthotics: These help to control some of the abnormal motion in your feet. The abnormal motion can lead to extra torque and pressure on the nerve.   8.  Physical Therapy  9.  Cortisone Injection: Helps to decrease the size of the irritated, enlarged nerve.   10.  Sclerosing Alcohol Injection: Helps to destroy the nerve chemically, which causes permanent numbness    SURGERY  If conservative treatment does not help surgery may be needed. Surgery may involve cutting out the nerve or cutting the intermetatarsalligament. Studies have shown surgery has an 80-85% success rate.  Will result in numbness    PREVENTION  -Avoid wearing narrow, pointed toe shoes, or high heels    PRICE THERAPY  Many aches and pains throughout the foot and ankle can be helped with many simple treatments. This is usually described as PRICE Therapy.      P - Protection - often times, inflammation/pain in the lower extremity is not able to improve simply because the areas involved are never allowed to rest. Every step we take can bother the problematic area. Protecting those areas is an important step in the healing process. This may involve a walking cast boot, a special insert/orthotic device, an ankle brace, or simply avoiding barefoot walking.    R - Rest - in addition to protecting the foot/ankle, resting is an important, but often times difficult, treatment option. Getting off your feet when they bother you, and specifically avoiding activities that cause pain/discomfort, are very beneficial to prevent, and treat,  foot/ankle pain.      I - Ice - icing regularly can help to decrease inflammation and swelling in the foot, thus decreasing pain. Using an ice pack or a bag of frozen veggies works very well. Ice for 20 minutes multiple times per day as needed.  Do not place the ice directly on the skin as this can cause tissue damage.    C - Compression - using a compression wrap or an ACE wrap can help to decrease swelling, which can help to decrease pain. Wearing the wraps is generally not needed at night, but they should be worn on a regular basis when you are going to be on your feet for prolonged periods as gravity tends to pull fluids down to your feet/ankles.    E - Elevation - elevating your lower extremities multiple times daily for 15-20 minutes can help to decrease swelling, which works well in decreasing pain levels.    NSAID/Tylenol - Anti-inflammatories like Aleve or ibuprofen, and/or a pain medication, such as Tylenol, can help to improve pain levels and get the issue resolved sooner rather than later. Anyone with liver issues should be careful with Tylenol, and anyone with high blood pressure or heart, stomach or kidney issues should be careful with anti-inflammatories. Please ask if you have questions about these medications, including dosage.    OVER THE COUNTER INSERTS    Most of these can be found at your local [employer], [employer], [employer], sporting [employer], or online:    SuperFeet   Sofsole Fit Spenco   Power Step   Walk-Fit ([employer])  *For heel pain* Arch Cradles  *For heel pain*       ** A good high quality over the counter insert should cost around $40-$50    ** Capsulitis/Metarasalgia - try adding a metatarsal pad on your inserts or find an insert with one built in      Today your received a diagnostic/therapeutic steroid injection.    We would like you to monitor 3 factors after the injection to determine what future treatment modalities would be most appropriate:    1.  Percentage  improvement - while the injection will wear off and your symptoms may return, what was the highest level of improvement.  2. Location - where did you notice improvement, and conversely, where did you not see improvement?  3. Duration - how long did the improvement last for.    You do not need to schedule a follow-up visit.  When the injection wears off, and if they symptoms return to the point of needing further clinic follow up, whether a month or year has passed, schedule an appointment and we'll decide what the next step is.

## 2022-02-08 NOTE — PROGRESS NOTES
"Foot & Ankle Surgery  February 8, 2022    CC: \" Foot pain\"    I was asked to see Jonny Quinones regarding the chief complaint by: Dr. Lit Gaston    HPI:  Pt is a 46 year old male who presents with above complaint.  Multiple month history of left foot pain.  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  Tried rest for treatment.  \"Super bad pain\" in the plantar left forefoot.  He had x-rays on 1/20/2022 that was negative for any acute musculoskeletal pathology.  There is been no injury.  Just 1 day it happened\".  \"Herniated disc my back\", the lumbosacral spine.  \"I was supposed to have surgery on that years ago\".    ROS:   Pos for CC.  The patient denies current nausea, vomiting, chills, fevers, belly pain, calf pain, chest pain or SOB.  Complete remainder of ROS is otherwise neg.    VITALS:    Vitals:    02/08/22 1023   Height: 1.88 m (6' 2\")       PMH:    Past Medical History:   Diagnosis Date     Abscess of lung (H) 2/5/2019    Overview:  H/O     Anxiety      Depressive disorder      Factor V Leiden (H)      Gout      Hypertension      Pre-diabetes      Sleep apnea      Vitamin D deficiency        SXHX:    Past Surgical History:   Procedure Laterality Date     athroereisis  03/27/2009    ID Subtalar     ENT SURGERY Bilateral 1985    tonsilectomy     LAPAROSCOPIC GASTRIC ADJUSTABLE BANDING  11/2009     SINUS SURGERY  2001     sphincterotomy  02/2009    lateral internal        MEDS:    Current Outpatient Medications   Medication     amLODIPine (NORVASC) 10 MG tablet     Cholecalciferol (VITAMIN D3) 50 MCG (2000 UT) CAPS     citalopram (CELEXA) 20 MG tablet     hydrochlorothiazide (HYDRODIURIL) 25 MG tablet     ibuprofen (ADVIL/MOTRIN) 200 MG tablet     metoprolol succinate ER (TOPROL-XL) 100 MG 24 hr tablet     metoprolol succinate ER (TOPROL-XL) 50 MG 24 hr tablet     olmesartan (BENICAR) 40 MG tablet     potassium chloride ER (K-TAB) 20 MEQ CR tablet     " UNABLE TO FIND     No current facility-administered medications for this visit.       ALL:     Allergies   Allergen Reactions     Allopurinol GI Disturbance     Amoxicillin Other (See Comments)     headaches     Bupropion Other (See Comments)     Seasonal Allergies      Dust, mold, pollen       Venlafaxine Other (See Comments)       FMH:    Family History   Problem Relation Age of Onset     Allergies Mother      Blood Disease Mother         Leiden Factor 5     Cerebrovascular Disease Mother      Hypertension Father      Hypertension Maternal Grandmother      Peripheral Vascular Disease Maternal Grandmother      Allergies Brother      Breast Cancer Paternal Aunt      Thrombosis Paternal Grandfather        SocHx:    Social History     Socioeconomic History     Marital status:      Spouse name: Not on file     Number of children: Not on file     Years of education: Not on file     Highest education level: Not on file   Occupational History     Not on file   Tobacco Use     Smoking status: Never Smoker     Smokeless tobacco: Never Used   Vaping Use     Vaping Use: Never used   Substance and Sexual Activity     Alcohol use: No     Drug use: No     Sexual activity: Yes     Partners: Female     Birth control/protection: I.U.D.   Other Topics Concern     Not on file   Social History Narrative     Not on file     Social Determinants of Health     Financial Resource Strain: Not on file   Food Insecurity: Not on file   Transportation Needs: Not on file   Physical Activity: Not on file   Stress: Not on file   Social Connections: Not on file   Intimate Partner Violence: Not on file   Housing Stability: Not on file           EXAMINATION:  Gen:   No apparent distress  Neuro:   A&Ox3, no deficits  Psych:    Answering questions appropriately for age and situation with normal affect  Head:    NCAT  Eye:    Visual scanning without deficit  Ear:    Response to auditory stimuli wnl  Lung:    Non-labored breathing on RA  noted  Abd:    NTND per patient report  Lymph:    Neg for pitting/non-pitting edema BLE  Vasc:    Pulses palpable, CFT minimally delayed  Neuro:    Light touch sensation intact to all sensory nerve distributions without paresthesias  Derm:    Neg for nodules, lesions or ulcerations  MSK: Left forefoot -he is point tender with a positive Holly sign distal third intermetatarsal space without adjacent MPJ or metatarsal pain.  No other acute pathology is noted on exam today.  Calf:    Neg for redness, swelling or tenderness      Imaging: X-rays left foot 1/20/2022 - IMPRESSION: No evidence of acute fracture. Normal joint spacing.  Calcaneal spurring.     Assessment:  46 year old male with left forefoot pain distal third intermetatarsal space consistent with Blanchard's neuroma      Plan:  Discussed etiologies, anatomy and options  1.  Left third intermetatarsal space neuroma  -I personally reviewed and interpreted the patient's lower extremity history pertinent to today's visit, including imaging/labs, in preparation for initiating a treatment program.  -Our neuroma handout was dispensed for patient information  -I advised comfortable accommodative shoe gear  -Our OTC insert handout was dispensed for patient information.  We discussed that while inserts oftentimes can alleviate strain in tissues, this may exacerbate symptoms with respect to his neuroma.  If so, he was advised to discontinue  -RICE/NSAID versus Tylenol.  Based on pain  -We discussed the option for diagnostic/therapeutic injection.  See procedure note for details.  Patient was advised to monitor percent, location and duration of relief.  Secondary treatments, if necessary, will be based on injection results    Small Joint Injection/Arthrocentesis: L third MTP    Date/Time: 2/8/2022 11:18 AM  Performed by: Jonny Thompson DPM  Authorized by: Jonny Thompson DPM   Indications:  Pain and diagnostic evaluation  Needle Size:  25 G  Guidance: landmark      Approach:  Dorsal  Location:  Third toe    Site:  L third MTP                    Medications:  40 mg triamcinolone 40 MG/ML; 0.5 mL lidocaine 1 %                     After obtaining written consent, the skin was prepped with alcohol.  The needle was advance to the underlying left third common digital plantar nerve, aspiration was done with position change.  1 1/2cc mixture of 2:1 kenalog 40:0.25% marcaine plain was injected.  The patient tolerated the procedure without complication.  Risks that were discussed included possible joint/soft tissue damage, neuritis/numbness, infection, pigment change, steroid flare.               Follow up: As needed based on injection results or sooner with acute issues      Patient's medical history was reviewed today      Jonny Thompson DPM FACFAS FACFAOM  Podiatric Foot & Ankle Surgeon  [facility]  [phone]    Disclaimer: This note consists of symbols derived from keyboarding, dictation and/or voice recognition software. As a result, there may be errors in the script that have gone undetected. Please consider this when interpreting information found in this chart.

## 2022-04-25 DIAGNOSIS — E87.6 HYPOKALEMIA: ICD-10-CM

## 2022-04-25 DIAGNOSIS — I10 ESSENTIAL HYPERTENSION: ICD-10-CM

## 2022-04-27 RX ORDER — METOPROLOL SUCCINATE 50 MG/1
TABLET, EXTENDED RELEASE ORAL
Qty: 90 TABLET | Refills: 1 | Status: SHIPPED | OUTPATIENT
Start: 2022-04-27 | End: 2022-10-12

## 2022-04-27 RX ORDER — METOPROLOL SUCCINATE 100 MG/1
TABLET, EXTENDED RELEASE ORAL
Qty: 90 TABLET | Refills: 1 | Status: SHIPPED | OUTPATIENT
Start: 2022-04-27 | End: 2022-10-12

## 2022-04-27 RX ORDER — POTASSIUM CHLORIDE 1500 MG/1
TABLET, EXTENDED RELEASE ORAL
Qty: 90 TABLET | Refills: 1 | Status: SHIPPED | OUTPATIENT
Start: 2022-04-27 | End: 2022-10-12

## 2022-04-27 NOTE — TELEPHONE ENCOUNTER
Routing refill request to provider for review/approval because:  Labs out of range:  potassium  Potassium   Date Value Ref Range Status   03/01/2021 3.3 (L) 3.4 - 5.3 mmol/L Final       Is patient taking both metoprolol doses? Could we update sig to reflect that for clarification if so?     Jean Pierre JARA RN

## 2022-04-30 ENCOUNTER — HEALTH MAINTENANCE LETTER (OUTPATIENT)
Age: 47
End: 2022-04-30

## 2022-05-16 ENCOUNTER — MYC REFILL (OUTPATIENT)
Dept: FAMILY MEDICINE | Facility: CLINIC | Age: 47
End: 2022-05-16
Payer: COMMERCIAL

## 2022-05-16 DIAGNOSIS — I10 ESSENTIAL HYPERTENSION: ICD-10-CM

## 2022-05-16 DIAGNOSIS — F41.1 ANXIETY STATE: ICD-10-CM

## 2022-05-16 NOTE — LETTER
May 26, 2022      Jonny Quinones  [address] [address]        Dear Mr. Jonny Quinones,    We recently received a call from your pharmacy requesting a refill of your medication Citalopram.    We are contacting you today to notify you that you are due for a medication check for further refills.    We have authorized one refill of your medication to allow time for you to schedule your appointment.    This appointment can be in clinic or virtual by either telephone, video.    Please call [phone] to schedule an appointment or if you have MyChart you can schedule with your provider as well.    Taking care of your health is important to us, an ongoing visits with your provider are vital to your care. We look forward to seeing you in the near future.    Thank you for using [facility] for your Medical Needs.          Sincerely,     Robe Gaston PA-C

## 2022-05-18 RX ORDER — CITALOPRAM HYDROBROMIDE 20 MG/1
TABLET ORAL
Qty: 90 TABLET | Refills: 0 | Status: SHIPPED | OUTPATIENT
Start: 2022-05-18 | End: 2022-06-24

## 2022-05-18 NOTE — TELEPHONE ENCOUNTER
Medication is being filled for 1 time refill only due to:  Patient needs to be seen because Return in about 3 months (around 4/28/2022)..  Prescription approved per [facility] Refill Protocol.  Transferred to  for scheduling  Khushbu Zamudio RN, BSN  [facility]

## 2022-05-18 NOTE — TELEPHONE ENCOUNTER
Routing refill request to provider for review/approval because:  Labs out of range:  creatinine, sodium  Labs not current:  potassium    Potassium   Date Value Ref Range Status   03/01/2021 3.3 (L) 3.4 - 5.3 mmol/L Marisa JARA RN

## 2022-05-19 RX ORDER — HYDROCHLOROTHIAZIDE 25 MG/1
25 TABLET ORAL DAILY
Qty: 90 TABLET | Refills: 0 | Status: SHIPPED | OUTPATIENT
Start: 2022-05-19 | End: 2022-06-24

## 2022-06-24 ENCOUNTER — VIRTUAL VISIT (OUTPATIENT)
Dept: FAMILY MEDICINE | Facility: CLINIC | Age: 47
End: 2022-06-24
Payer: COMMERCIAL

## 2022-06-24 DIAGNOSIS — I10 ESSENTIAL HYPERTENSION: ICD-10-CM

## 2022-06-24 DIAGNOSIS — Z12.11 SCREEN FOR COLON CANCER: ICD-10-CM

## 2022-06-24 DIAGNOSIS — F41.1 ANXIETY STATE: ICD-10-CM

## 2022-06-24 DIAGNOSIS — M54.2 NECK PAIN: ICD-10-CM

## 2022-06-24 DIAGNOSIS — G44.209 TENSION HEADACHE: ICD-10-CM

## 2022-06-24 DIAGNOSIS — E65 BUFFALO HUMP: Primary | ICD-10-CM

## 2022-06-24 DIAGNOSIS — E87.6 HYPOKALEMIA: ICD-10-CM

## 2022-06-24 PROCEDURE — 99214 OFFICE O/P EST MOD 30 MIN: CPT | Mod: 95 | Performed by: PHYSICIAN ASSISTANT

## 2022-06-24 RX ORDER — CITALOPRAM HYDROBROMIDE 20 MG/1
20 TABLET ORAL DAILY
Qty: 90 TABLET | Refills: 1 | Status: SHIPPED | OUTPATIENT
Start: 2022-06-24 | End: 2023-02-22

## 2022-06-24 RX ORDER — OLMESARTAN MEDOXOMIL 40 MG/1
40 TABLET ORAL DAILY
Qty: 90 TABLET | Refills: 1 | Status: SHIPPED | OUTPATIENT
Start: 2022-06-24 | End: 2022-12-22

## 2022-06-24 RX ORDER — HYDROCHLOROTHIAZIDE 25 MG/1
25 TABLET ORAL DAILY
Qty: 90 TABLET | Refills: 1 | Status: SHIPPED | OUTPATIENT
Start: 2022-06-24 | End: 2023-02-22

## 2022-06-24 RX ORDER — AMLODIPINE BESYLATE 10 MG/1
10 TABLET ORAL DAILY
Qty: 90 TABLET | Refills: 1 | Status: SHIPPED | OUTPATIENT
Start: 2022-06-24 | End: 2022-12-22

## 2022-06-24 NOTE — PROGRESS NOTES
"Jonny is a 47 year old who is being evaluated via a billable video visit.      How would you like to obtain your AVS? [employer]  If the video visit is dropped, the invitation should be resent by: Text to cell phone: [phone]  Will anyone else be joining your video visit? Yes: wife Nayana. How would they like to receive their invitation? Text to cell phone: on mychart        Assessment & Plan     [place] hump  Neck pain  Tension headache  Ultrasound of the neck did not show lipomatous lesion. At this point consider endocrine to rule out cortisolism vs schedule with plastic/general surgery to consider removal. He did have some work up previously 2/2 elevated urinary metanephrines and that testing was normal. Certainly it is adding to neck pain and very likely some chronic headache and quality of life would be improved with reduction. He is going to consider and let me know    Essential hypertension  Controlled per home readings. Continue present management.   - Albumin Random Urine Quantitative with Creat Ratio; Future  - Basic metabolic panel  (Ca, Cl, CO2, Creat, Gluc, K, Na, BUN); Future  - amLODIPine (NORVASC) 10 MG tablet; Take 1 tablet (10 mg) by mouth daily  - hydrochlorothiazide (HYDRODIURIL) 25 MG tablet; Take 1 tablet (25 mg) by mouth daily  - olmesartan (BENICAR) 40 MG tablet; Take 1 tablet (40 mg) by mouth daily    Hypokalemia  Need to update labs  - Basic metabolic panel  (Ca, Cl, CO2, Creat, Gluc, K, Na, BUN); Future    Anxiety state  Well controlled. Continue present management.   - citalopram (CELEXA) 20 MG tablet; Take 1 tablet (20 mg) by mouth daily    Screen for colon cancer  due  - Colonscopy Screening  Referral; Future  BMI:   Estimated body mass index is 44.81 kg/m  as calculated from the following:    Height as of 2/8/22: 1.88 m (6' 2\").    Weight as of 2/8/22: 158.3 kg (349 lb).     Return in about 1 year (around 6/24/2023) for Physical Exam, Lab Work.    Robe Gaston, " OWEN CALLEJAS [facility] SHIRLEY Fuller is a 47 year old, presenting for the following health issues:  Recheck Medication    Patient is being seen for Refills on Medications  He mentions after the injection into his foot it has been much better; no further pain    He reviewed his ultrasound of the posterior neck hump  There was no large lipoma or other abnormality  The hump continues to press on the neck and pushes his head forward  It is distressing and disrupts sleep    HPI     Hypertension Follow-up      Do you check your blood pressure regularly outside of the clinic? Yes     Are you following a low salt diet? Yes    Are your blood pressures ever more than 140 on the top number (systolic) OR more   than 90 on the bottom number (diastolic), for example 140/90? No      How many servings of fruits and vegetables do you eat daily?  2-3    On average, how many sweetened beverages do you drink each day (Examples: soda, juice, sweet tea, etc.  Do NOT count diet or artificially sweetened beverages)?   0    How many days per week do you exercise enough to make your heart beat faster? 3 or less    How many minutes a day do you exercise enough to make your heart beat faster? 9 or less    How many days per week do you miss taking your medication? 0  BP has been pretty consistent of 130/82  Good pill taker        Anxiety Follow-Up    How are you doing with your anxiety since your last visit? No change    Are you having other symptoms that might be associated with anxiety? No    Have you had a significant life event? No     Are you feeling depressed? No    Do you have any concerns with your use of alcohol or other drugs? No    Social History     Tobacco Use     Smoking status: Never Smoker     Smokeless tobacco: Never Used   Vaping Use     Vaping Use: Never used   Substance Use Topics     Alcohol use: No     Drug use: No     JULIUS-7 SCORE 7/14/2020 1/15/2021 10/1/2021   Total Score 4 1 4     PHQ  7/14/2020 1/15/2021 10/1/2021   PHQ-9 Total Score 4 2 2   Q9: Thoughts of better off dead/self-harm past 2 weeks Not at all Not at all Not at all       Review of Systems   Constitutional, HEENT, cardiovascular, pulmonary, gi and gu systems are negative, except as otherwise noted.      Objective    Vitals - Patient Reported  Systolic (Patient Reported): 132  Diastolic (Patient Reported): 80      Vitals:  No vitals were obtained today due to virtual visit.    Physical Exam   GENERAL: Healthy, alert and no distress  RESP: No audible wheeze, cough, or visible cyanosis.  No visible retractions or increased work of breathing.    PSYCH: Mentation appears normal, affect normal/bright, judgement and insight intact, normal speech and appearance well-groomed.    Tests reviewed            Video-Visit Details    Video Start Time: 1:00 PM    Type of service:  Video Visit    Video End Time:1:37 PM    Originating Location (pt. Location): Home    Distant Location (provider location):  [facility]     Platform used for Video Visit: Pyreg    Krystle Simon

## 2022-09-09 ENCOUNTER — TELEPHONE (OUTPATIENT)
Dept: GASTROENTEROLOGY | Facility: CLINIC | Age: 47
End: 2022-09-09

## 2022-09-09 NOTE — TELEPHONE ENCOUNTER
Screening Questions    BlueKIND OF PREP RedLOCATION [review exclusion criteria] GreenSEDATION TYPE      1. Are you active on mychart? Yes    2. What insurance is in the chart? BCBS     3.   Ordering/Referring Provider: Alek    4. BMI   (If greater than 40 review exclusion criteria [PAC APPT IF [MAC] @ [facility])  45.6  [If yes, BMI OVER 40-EXTENDED PREP]      **(Sedation review/consideration needed)**  Do you have a legal guardian or Medical Power of    and/or are you able to give consent for your medical care?     Yes    5. Have you had a positive covid test in the last 90 days?   N - NA    6.  Are you currently on dialysis?   N [ If yes, G-PREP & HOSPITAL setting ONLY]     7.  Do you have chronic kidney disease?  N [ If yes, G-PREP ]    8.   Do you have a diagnosis of diabetes?   N   [ If yes, G-PREP ]    9.  On a regular basis do you go 3-5 days between bowel movements?   N   [ If yes, EXTENDED PREP]    10.  Are you taking any prescription pain medications on a routine schedule?    N - NA [ If yes, EXTENDED PREP] [If yes, MAC]      11.   Do you have any chemical dependencies such as alcohol, street drugs, or methadone?    N [If yes, MAC]    12.   Do you have any history of post-traumatic stress syndrome, severe anxiety or history of psychosis?    N  [If yes, MAC]    13.  [FEMALES] Are you currently pregnant? NA    If yes, how many weeks?       Respiratory/Heart Screening:  [If yes to any of the following HOSPITAL setting only]     14. Do you have Pulmonary Hypertension [Lungs]?   N       15. Do you have UNCONTROLLED asthma?   N     16.  Do you use daily home oxygen?  N      17. Do you have mod to severe Obstructive Sleep Apnea?         (OKAY @  [facility]  SH  PH  RI  MG - if pt is not on OXYGEN)  Yes-cpap used      18.   Have you had a heart or lung transplant?   N      19.   Have you had a stroke or Transient ischemic attack (TIA - aka  mini stroke ) within 6 months?  (If yes, please review exclusion  criteria)  N     20.   In the past 6 months, have you had any heart related issues including cardiomyopathy or heart attack?   N           If yes, did it require cardiac stenting or other implantable device?   N      21.   Do you have any implantable devices in your body (pacemaker, defib, LVAD)? (If yes, please review exclusion criteria)  N   22.  Do you take the medication Phentermine?  NO        23. Do you take nitroglycerin?   N           If yes, how often? NA  (if yes, HOSPITAL setting ONLY)    24.  Are you currently taking any blood thinners?    [If yes, INFORM patient to follw up w/ ORDERING PROVIDER FOR BRIDGING INSTRUCTIONS]     N    25.   Do you transfer independently?                (If NO, please HOSPITAL setting ONLY)  Yes    26.   Preferred LOCAL Pharmacy for Pre Prescription:        [facility] [id number] [facility], [place] - [address]    Scheduling Details  (Please ask for phone number if not scheduled by patient)      Caller : Charlene  Date of Procedure: 9/29/22  Surgeon: Asim  Location: [facility]        Sedation Type: CS l   Conscious Sedation- Needs  for 6 hours after the procedure  MAC/General-Needs  for 24 hours after procedure    NA :[Pre-op Required] at [facility]  SH  MG and OR for MAC sedation   (advise patient they will need a pre-op WITH IN 30 DAYS of procedure date)     Type of Procedure Scheduled:   Lower Endoscopy [Colonoscopy]    Which Colonoscopy Prep was Sent?:   Extended -     [facility] PATIENTS & [facility] PATIENTS NEEDS EXTENDED PREP       Informed patient they will need an adult  Yes  Cannot take any type of public or medical transportation alone    Pre-Procedure Covid test to be completed at [facility] or Externally: HOME  **INFORMED OF HOME TESTING & LAB OPTION**        Confirmed Nurse will call to complete assessment Yes    Additional comments:

## 2022-09-14 RX ORDER — BISACODYL 5 MG
TABLET, DELAYED RELEASE (ENTERIC COATED) ORAL
Qty: 4 TABLET | Refills: 0 | Status: SHIPPED | OUTPATIENT
Start: 2022-09-14 | End: 2023-04-20

## 2022-09-29 ENCOUNTER — HOSPITAL ENCOUNTER (OUTPATIENT)
Facility: CLINIC | Age: 47
Discharge: HOME | End: 2022-09-29
Attending: INTERNAL MEDICINE | Admitting: INTERNAL MEDICINE
Payer: COMMERCIAL

## 2022-09-29 VITALS
HEIGHT: 74 IN | DIASTOLIC BLOOD PRESSURE: 95 MMHG | RESPIRATION RATE: 16 BRPM | WEIGHT: 315 LBS | TEMPERATURE: 97.6 F | OXYGEN SATURATION: 96 % | HEART RATE: 59 BPM | BODY MASS INDEX: 40.43 KG/M2 | SYSTOLIC BLOOD PRESSURE: 126 MMHG

## 2022-09-29 DIAGNOSIS — Z12.11 SPECIAL SCREENING FOR MALIGNANT NEOPLASMS, COLON: Primary | ICD-10-CM

## 2022-09-29 LAB — COLONOSCOPY: NORMAL

## 2022-09-29 PROCEDURE — 250N000011 HC RX IP 250 OP 636: Performed by: INTERNAL MEDICINE

## 2022-09-29 PROCEDURE — G0500 MOD SEDAT ENDO SERVICE >5YRS: HCPCS | Performed by: INTERNAL MEDICINE

## 2022-09-29 PROCEDURE — 45378 DIAGNOSTIC COLONOSCOPY: CPT | Performed by: INTERNAL MEDICINE

## 2022-09-29 PROCEDURE — G0121 COLON CA SCRN NOT HI RSK IND: HCPCS | Performed by: INTERNAL MEDICINE

## 2022-09-29 RX ORDER — FLUMAZENIL 0.1 MG/ML
0.2 INJECTION, SOLUTION INTRAVENOUS
Status: DISCONTINUED | OUTPATIENT
Start: 2022-09-29 | End: 2022-09-29 | Stop reason: HOSPADM

## 2022-09-29 RX ORDER — ONDANSETRON 2 MG/ML
4 INJECTION INTRAMUSCULAR; INTRAVENOUS EVERY 6 HOURS PRN
Status: DISCONTINUED | OUTPATIENT
Start: 2022-09-29 | End: 2022-09-29 | Stop reason: HOSPADM

## 2022-09-29 RX ORDER — ATROPINE SULFATE 0.1 MG/ML
1 INJECTION INTRAVENOUS
Status: DISCONTINUED | OUTPATIENT
Start: 2022-09-29 | End: 2022-09-29 | Stop reason: HOSPADM

## 2022-09-29 RX ORDER — NALOXONE HYDROCHLORIDE 0.4 MG/ML
0.2 INJECTION, SOLUTION INTRAMUSCULAR; INTRAVENOUS; SUBCUTANEOUS
Status: DISCONTINUED | OUTPATIENT
Start: 2022-09-29 | End: 2022-09-29 | Stop reason: HOSPADM

## 2022-09-29 RX ORDER — ONDANSETRON 2 MG/ML
4 INJECTION INTRAMUSCULAR; INTRAVENOUS
Status: DISCONTINUED | OUTPATIENT
Start: 2022-09-29 | End: 2022-09-29 | Stop reason: HOSPADM

## 2022-09-29 RX ORDER — PROCHLORPERAZINE MALEATE 10 MG
10 TABLET ORAL EVERY 6 HOURS PRN
Status: DISCONTINUED | OUTPATIENT
Start: 2022-09-29 | End: 2022-09-29 | Stop reason: HOSPADM

## 2022-09-29 RX ORDER — FENTANYL CITRATE 0.05 MG/ML
50-100 INJECTION, SOLUTION INTRAMUSCULAR; INTRAVENOUS EVERY 5 MIN PRN
Status: DISCONTINUED | OUTPATIENT
Start: 2022-09-29 | End: 2022-09-29 | Stop reason: HOSPADM

## 2022-09-29 RX ORDER — DIPHENHYDRAMINE HYDROCHLORIDE 50 MG/ML
25-50 INJECTION INTRAMUSCULAR; INTRAVENOUS
Status: DISCONTINUED | OUTPATIENT
Start: 2022-09-29 | End: 2022-09-29 | Stop reason: HOSPADM

## 2022-09-29 RX ORDER — EPINEPHRINE 1 MG/ML
0.1 INJECTION, SOLUTION INTRAMUSCULAR; SUBCUTANEOUS
Status: DISCONTINUED | OUTPATIENT
Start: 2022-09-29 | End: 2022-09-29 | Stop reason: HOSPADM

## 2022-09-29 RX ORDER — NALOXONE HYDROCHLORIDE 0.4 MG/ML
0.4 INJECTION, SOLUTION INTRAMUSCULAR; INTRAVENOUS; SUBCUTANEOUS
Status: DISCONTINUED | OUTPATIENT
Start: 2022-09-29 | End: 2022-09-29 | Stop reason: HOSPADM

## 2022-09-29 RX ORDER — SIMETHICONE 40MG/0.6ML
133 SUSPENSION, DROPS(FINAL DOSAGE FORM)(ML) ORAL
Status: DISCONTINUED | OUTPATIENT
Start: 2022-09-29 | End: 2022-09-29 | Stop reason: HOSPADM

## 2022-09-29 RX ORDER — ONDANSETRON 4 MG/1
4 TABLET, ORALLY DISINTEGRATING ORAL EVERY 6 HOURS PRN
Status: DISCONTINUED | OUTPATIENT
Start: 2022-09-29 | End: 2022-09-29 | Stop reason: HOSPADM

## 2022-09-29 RX ORDER — LIDOCAINE 40 MG/G
CREAM TOPICAL
Status: DISCONTINUED | OUTPATIENT
Start: 2022-09-29 | End: 2022-09-29 | Stop reason: HOSPADM

## 2022-09-29 RX ADMIN — FENTANYL CITRATE 50 MCG: 50 INJECTION INTRAMUSCULAR; INTRAVENOUS at 08:13

## 2022-09-29 RX ADMIN — FENTANYL CITRATE 100 MCG: 50 INJECTION INTRAMUSCULAR; INTRAVENOUS at 08:06

## 2022-09-29 RX ADMIN — MIDAZOLAM HYDROCHLORIDE 2 MG: 1 INJECTION, SOLUTION INTRAMUSCULAR; INTRAVENOUS at 08:06

## 2022-09-29 RX ADMIN — MIDAZOLAM HYDROCHLORIDE 1 MG: 1 INJECTION, SOLUTION INTRAMUSCULAR; INTRAVENOUS at 08:13

## 2022-09-29 NOTE — H&P
Pre-Endoscopy History and Physical     Jonny Quinones MRN# [id number]   YOB: 1975 Age: 47 year old     Date of Procedure: 9/29/2022  Primary care provider: Robe Gaston  Type of Endoscopy: Colonoscopy with possible biopsy, possible polypectomy  Reason for Procedure: screen  Type of Anesthesia Anticipated: Conscious Sedation    HPI:    Jonny is a 47 year old male who will be undergoing the above procedure.      A history and physical has been performed. The patient's medications and allergies have been reviewed. The risks and benefits of the procedure and the sedation options and risks were discussed with the patient.  All questions were answered and informed consent was obtained.      He denies a personal or family history of anesthesia complications or bleeding disorders.     Patient Active Problem List   Diagnosis     Prediabetes     Lipid screening     Gout, unspecified     Essential hypertension     TREMAINE (obstructive sleep apnea)     Vitamin D deficiency     Anxiety state     Allergic rhinitis     Morbid obesity (H)     Factor V Leiden (H)     Lumbar herniated disc     CME (cystoid macular edema), right     Chronic low back pain without sciatica, unspecified back pain laterality        Past Medical History:   Diagnosis Date     Abscess of lung (H) 2/5/2019    Overview:  H/O     Anxiety      Depressive disorder      Factor V Leiden (H)      Gout      Hypertension      Pre-diabetes      Sleep apnea      Vitamin D deficiency         Past Surgical History:   Procedure Laterality Date     athroereisis  03/27/2009    [place] [facility]     ENT SURGERY Bilateral 1985    tonsilectomy     LAPAROSCOPIC GASTRIC ADJUSTABLE BANDING  11/2009     SINUS SURGERY  2001     sphincterotomy  02/2009    lateral internal; anal fissure       Social History     Tobacco Use     Smoking status: Never Smoker     Smokeless tobacco: Never Used   Substance Use Topics     Alcohol use: No       Family History   Problem  Relation Age of Onset     Allergies Mother      Blood Disease Mother         Leiden Factor 5     Cerebrovascular Disease Mother      Hypertension Father      Hypertension Maternal Grandmother      Peripheral Vascular Disease Maternal Grandmother      Thrombosis Paternal Grandfather      Allergies Brother      Breast Cancer Paternal Aunt      Colon Cancer No family hx of        Prior to Admission medications    Medication Sig Start Date End Date Taking? Authorizing Provider   amLODIPine (NORVASC) 10 MG tablet Take 1 tablet (10 mg) by mouth daily 6/24/22  Yes Robe Gaston PA-C   bisacodyl (DULCOLAX) 5 MG EC tablet Take 2 tablets at 3 pm the day before your procedure. If your procedure is before 11 am, take 2 additional tablets at 8 pm. If your procedure is after 11 am, take 2 additional tablets at 6 am. For additional instructions refer to your colonoscopy prep instructions. 9/14/22  Yes Anoop Dailey MD   Cholecalciferol (VITAMIN D3) 50 MCG (2000 UT) CAPS Take 2 capsules by mouth once daily. 9/21/20  Yes Erika Davison Ra, APRN CNP   citalopram (CELEXA) 20 MG tablet Take 1 tablet (20 mg) by mouth daily 6/24/22  Yes Robe Gaston PA-C   hydrochlorothiazide (HYDRODIURIL) 25 MG tablet Take 1 tablet (25 mg) by mouth daily 6/24/22  Yes Robe Gaston PA-C   ibuprofen (ADVIL/MOTRIN) 200 MG tablet Take 200 mg by mouth every 4 hours as needed for mild pain   Yes Reported, Patient   metoprolol succinate ER (TOPROL-XL) 100 MG 24 hr tablet Take 1 tablet by mouth once daily 4/27/22  Yes Robe Gaston PA-C   metoprolol succinate ER (TOPROL-XL) 50 MG 24 hr tablet Take 1 tablet by mouth once daily 4/27/22  Yes Robe Gaston PA-C   olmesartan (BENICAR) 40 MG tablet Take 1 tablet (40 mg) by mouth daily 6/24/22  Yes Robe Gaston PA-C   polyethylene glycol (GOLYTELY) 236 g suspension The night before the exam at 6 pm drink an 8-ounce glass every 15 minutes until the jug is  "half empty. If you arrive before 11 AM: Drink the other half of the Golytely jug at 11 PM night before procedure. If you arrive after 11 AM: Drink the other half of the Golytely jug at 6 AM day of procedure. For additional instructions refer to your colonoscopy prep instructions. 9/27/22  Yes Anoop Dailey MD   polyethylene glycol (GOLYTELY) 236 g suspension The night before the exam at 6 pm drink an 8-ounce glass every 15 minutes until the jug is half empty. If you arrive before 11 AM: Drink the other half of the Golytely jug at 11 PM night before procedure. If you arrive after 11 AM: Drink the other half of the Golytely jug at 6 AM day of procedure. For additional instructions refer to your colonoscopy prep instructions. 9/14/22  Yes Anoop Dailey MD   potassium chloride ER (K-TAB) 20 MEQ CR tablet Take 1 tablet by mouth once daily 4/27/22  Yes Robe Gaston PA-C   UNABLE TO FIND MEDICATION NAME: CPAP machine    Reported, Patient       Allergies   Allergen Reactions     Allopurinol GI Disturbance     Amoxicillin Other (See Comments)     headaches     Bupropion Other (See Comments)     Seasonal Allergies      Dust, mold, pollen       Venlafaxine Other (See Comments)        REVIEW OF SYSTEMS:   5 point ROS negative except as noted above in HPI, including Gen., Resp., CV, GI &  system review.    PHYSICAL EXAM:   BP (!) 161/105   Pulse 62   Temp 97.6  F (36.4  C) (Temporal)   Resp 15   Ht 1.88 m (6' 2\")   Wt (!) 158.3 kg (349 lb)   SpO2 98%   BMI 44.81 kg/m   Estimated body mass index is 44.81 kg/m  as calculated from the following:    Height as of this encounter: 1.88 m (6' 2\").    Weight as of this encounter: 158.3 kg (349 lb).   GENERAL APPEARANCE: alert, and oriented  MENTAL STATUS: alert  AIRWAY EXAM: Mallampatti Class I (visualization of the soft palate, fauces, uvula, anterior and posterior pillars)  RESP: lungs clear to auscultation - no rales, rhonchi or wheezes  CV: regular rates " and rhythm  DIAGNOSTICS:    Not indicated    IMPRESSION   ASA Class 2 - Mild systemic disease    PLAN:   Plan for Colonoscopy with possible biopsy, possible polypectomy. We discussed the risks, benefits and alternatives and the patient wished to proceed.    The above has been forwarded to the consulting provider.      Signed Electronically by: Anoop Dailey MD  September 29, 2022

## 2022-09-30 ENCOUNTER — LAB (OUTPATIENT)
Dept: LAB | Facility: CLINIC | Age: 47
End: 2022-09-30
Payer: COMMERCIAL

## 2022-09-30 DIAGNOSIS — I10 ESSENTIAL HYPERTENSION: ICD-10-CM

## 2022-09-30 DIAGNOSIS — E87.6 HYPOKALEMIA: ICD-10-CM

## 2022-09-30 DIAGNOSIS — Z11.59 NEED FOR HEPATITIS C SCREENING TEST: ICD-10-CM

## 2022-09-30 DIAGNOSIS — Z11.4 SCREENING FOR HIV (HUMAN IMMUNODEFICIENCY VIRUS): ICD-10-CM

## 2022-09-30 LAB
ANION GAP SERPL CALCULATED.3IONS-SCNC: 8 MMOL/L (ref 3–14)
BUN SERPL-MCNC: 18 MG/DL (ref 7–30)
CALCIUM SERPL-MCNC: 9.2 MG/DL (ref 8.5–10.1)
CHLORIDE BLD-SCNC: 107 MMOL/L (ref 94–109)
CO2 SERPL-SCNC: 27 MMOL/L (ref 20–32)
CREAT SERPL-MCNC: 0.94 MG/DL (ref 0.66–1.25)
GFR SERPL CREATININE-BSD FRML MDRD: >90 ML/MIN/1.73M2
GLUCOSE BLD-MCNC: 108 MG/DL (ref 70–99)
POTASSIUM BLD-SCNC: 3.7 MMOL/L (ref 3.4–5.3)
SODIUM SERPL-SCNC: 142 MMOL/L (ref 133–144)

## 2022-09-30 PROCEDURE — 86803 HEPATITIS C AB TEST: CPT

## 2022-09-30 PROCEDURE — 80048 BASIC METABOLIC PNL TOTAL CA: CPT

## 2022-09-30 PROCEDURE — 87389 HIV-1 AG W/HIV-1&-2 AB AG IA: CPT

## 2022-09-30 PROCEDURE — 36415 COLL VENOUS BLD VENIPUNCTURE: CPT

## 2022-10-01 LAB
HCV AB SERPL QL IA: NONREACTIVE
HIV 1+2 AB+HIV1 P24 AG SERPL QL IA: NONREACTIVE

## 2022-10-04 PROCEDURE — 82043 UR ALBUMIN QUANTITATIVE: CPT

## 2022-10-05 LAB
CREAT UR-MCNC: 238 MG/DL
MICROALBUMIN UR-MCNC: 11 MG/L
MICROALBUMIN/CREAT UR: 4.62 MG/G CR (ref 0–17)

## 2022-10-11 DIAGNOSIS — I10 ESSENTIAL HYPERTENSION: ICD-10-CM

## 2022-10-11 DIAGNOSIS — E87.6 HYPOKALEMIA: ICD-10-CM

## 2022-10-12 ENCOUNTER — MYC MEDICAL ADVICE (OUTPATIENT)
Dept: FAMILY MEDICINE | Facility: CLINIC | Age: 47
End: 2022-10-12

## 2022-10-12 RX ORDER — METOPROLOL SUCCINATE 50 MG/1
TABLET, EXTENDED RELEASE ORAL
Qty: 90 TABLET | Refills: 0 | Status: SHIPPED | OUTPATIENT
Start: 2022-10-12 | End: 2023-01-24

## 2022-10-12 RX ORDER — POTASSIUM CHLORIDE 1500 MG/1
TABLET, EXTENDED RELEASE ORAL
Qty: 90 TABLET | Refills: 0 | Status: SHIPPED | OUTPATIENT
Start: 2022-10-12 | End: 2023-01-24

## 2022-10-12 RX ORDER — METOPROLOL SUCCINATE 100 MG/1
TABLET, EXTENDED RELEASE ORAL
Qty: 90 TABLET | Refills: 0 | Status: SHIPPED | OUTPATIENT
Start: 2022-10-12 | End: 2023-01-24

## 2022-10-12 NOTE — TELEPHONE ENCOUNTER
Routing refill request to provider for review/approval because:  Labs out of range:  BP    BP Readings from Last 3 Encounters:   09/29/22 (!) 126/95   02/08/22 110/70   01/28/22 136/88     Jean Pierre JARA RN 10/12/2022 at 3:52 PM

## 2022-11-20 ENCOUNTER — HEALTH MAINTENANCE LETTER (OUTPATIENT)
Age: 47
End: 2022-11-20

## 2022-12-21 DIAGNOSIS — I10 ESSENTIAL HYPERTENSION: ICD-10-CM

## 2022-12-22 RX ORDER — AMLODIPINE BESYLATE 10 MG/1
TABLET ORAL
Qty: 90 TABLET | Refills: 1 | Status: SHIPPED | OUTPATIENT
Start: 2022-12-22 | End: 2023-04-20

## 2022-12-22 RX ORDER — OLMESARTAN MEDOXOMIL 40 MG/1
TABLET ORAL
Qty: 90 TABLET | Refills: 1 | Status: SHIPPED | OUTPATIENT
Start: 2022-12-22 | End: 2023-06-15

## 2022-12-22 NOTE — TELEPHONE ENCOUNTER
Routing refill request to provider for review/approval because:  Elevated BP    BP Readings from Last 3 Encounters:   09/29/22 (!) 126/95   02/08/22 110/70   01/28/22 136/88     Sara Jackson RN on 12/22/2022 at 7:40 AM

## 2023-01-23 DIAGNOSIS — I10 ESSENTIAL HYPERTENSION: ICD-10-CM

## 2023-01-23 DIAGNOSIS — E87.6 HYPOKALEMIA: ICD-10-CM

## 2023-01-24 RX ORDER — POTASSIUM CHLORIDE 1500 MG/1
TABLET, EXTENDED RELEASE ORAL
Qty: 90 TABLET | Refills: 1 | Status: SHIPPED | OUTPATIENT
Start: 2023-01-24 | End: 2023-07-18

## 2023-01-24 RX ORDER — METOPROLOL SUCCINATE 100 MG/1
100 TABLET, EXTENDED RELEASE ORAL DAILY
Qty: 90 TABLET | Refills: 1 | Status: SHIPPED | OUTPATIENT
Start: 2023-01-24 | End: 2023-07-18

## 2023-01-24 RX ORDER — METOPROLOL SUCCINATE 50 MG/1
50 TABLET, EXTENDED RELEASE ORAL DAILY
Qty: 90 TABLET | Refills: 1 | Status: SHIPPED | OUTPATIENT
Start: 2023-01-24 | End: 2023-07-18

## 2023-01-24 NOTE — TELEPHONE ENCOUNTER
Routing refill request to provider for review/approval because:  Potassium Chloride ER : Drug not active on patient's medication list  Elevated BP  Argelia Casey RN, BSN  [facility]

## 2023-02-21 DIAGNOSIS — I10 ESSENTIAL HYPERTENSION: ICD-10-CM

## 2023-02-21 DIAGNOSIS — F41.1 ANXIETY STATE: ICD-10-CM

## 2023-02-22 RX ORDER — CITALOPRAM HYDROBROMIDE 20 MG/1
TABLET ORAL
Qty: 90 TABLET | Refills: 0 | Status: SHIPPED | OUTPATIENT
Start: 2023-02-22 | End: 2023-04-20

## 2023-02-22 RX ORDER — HYDROCHLOROTHIAZIDE 25 MG/1
TABLET ORAL
Qty: 90 TABLET | Refills: 0 | Status: SHIPPED | OUTPATIENT
Start: 2023-02-22 | End: 2023-04-20

## 2023-02-22 NOTE — TELEPHONE ENCOUNTER
Routing refill request to provider for review/approval because:  Labs out of range:  BP    BP Readings from Last 3 Encounters:   09/29/22 (!) 126/95   02/08/22 110/70   01/28/22 136/88     Jean Pierre JARA RN 2/22/2023 at 2:27 PM

## 2023-03-23 ENCOUNTER — E-VISIT (OUTPATIENT)
Dept: URGENT CARE | Facility: CLINIC | Age: 48
End: 2023-03-23
Payer: COMMERCIAL

## 2023-03-23 DIAGNOSIS — J01.90 ACUTE BACTERIAL SINUSITIS: Primary | ICD-10-CM

## 2023-03-23 DIAGNOSIS — B96.89 ACUTE BACTERIAL SINUSITIS: Primary | ICD-10-CM

## 2023-03-23 PROCEDURE — 99421 OL DIG E/M SVC 5-10 MIN: CPT | Performed by: NURSE PRACTITIONER

## 2023-03-23 RX ORDER — DOXYCYCLINE HYCLATE 100 MG
100 TABLET ORAL 2 TIMES DAILY
Qty: 14 TABLET | Refills: 0 | Status: SHIPPED | OUTPATIENT
Start: 2023-03-23 | End: 2023-03-30

## 2023-03-23 NOTE — PATIENT INSTRUCTIONS
Dear Jonny Quinones    After reviewing your responses, I've been able to diagnose you with Acute bacterial sinusitis.      Based on your responses and diagnosis, I have prescribed   Orders Placed This Encounter     doxycycline hyclate (VIBRA-TABS) 100 MG tablet    to treat your symptoms. I have sent this to your pharmacy.?     It is also important to stay well hydrated, get lots of rest and take over-the-counter decongestants,?tylenol?or ibuprofen if you?are able to?take those medications per your primary care provider to help relieve discomfort.?     It is important that you take?all of?your prescribed medication even if your symptoms are improving after a few doses.? Taking?all of?your medicine helps prevent the symptoms from returning.?     If your symptoms worsen, you develop severe headache, vomiting, high fever (>102), or are not improving in 7 days, please contact your primary care provider for an appointment or visit any of our convenient Walk-in Care or Urgent Care Centers to be seen which can be found on our website?here.?     Thanks again for choosing?us?as your health care partner,?   ?  DEYSI Marrero CNP?

## 2023-04-17 PROBLEM — I82.449 ACUTE DEEP VEIN THROMBOSIS (DVT) OF TIBIAL VEIN (H): Status: ACTIVE | Noted: 2023-04-17

## 2023-04-19 ASSESSMENT — ANXIETY QUESTIONNAIRES
7. FEELING AFRAID AS IF SOMETHING AWFUL MIGHT HAPPEN: NOT AT ALL
GAD7 TOTAL SCORE: 0
5. BEING SO RESTLESS THAT IT IS HARD TO SIT STILL: NOT AT ALL
8. IF YOU CHECKED OFF ANY PROBLEMS, HOW DIFFICULT HAVE THESE MADE IT FOR YOU TO DO YOUR WORK, TAKE CARE OF THINGS AT HOME, OR GET ALONG WITH OTHER PEOPLE?: NOT DIFFICULT AT ALL
3. WORRYING TOO MUCH ABOUT DIFFERENT THINGS: NOT AT ALL
GAD7 TOTAL SCORE: 0
1. FEELING NERVOUS, ANXIOUS, OR ON EDGE: NOT AT ALL
6. BECOMING EASILY ANNOYED OR IRRITABLE: NOT AT ALL
2. NOT BEING ABLE TO STOP OR CONTROL WORRYING: NOT AT ALL
GAD7 TOTAL SCORE: 0
4. TROUBLE RELAXING: NOT AT ALL
IF YOU CHECKED OFF ANY PROBLEMS ON THIS QUESTIONNAIRE, HOW DIFFICULT HAVE THESE PROBLEMS MADE IT FOR YOU TO DO YOUR WORK, TAKE CARE OF THINGS AT HOME, OR GET ALONG WITH OTHER PEOPLE: NOT DIFFICULT AT ALL
7. FEELING AFRAID AS IF SOMETHING AWFUL MIGHT HAPPEN: NOT AT ALL

## 2023-04-19 ASSESSMENT — PATIENT HEALTH QUESTIONNAIRE - PHQ9
SUM OF ALL RESPONSES TO PHQ QUESTIONS 1-9: 4
10. IF YOU CHECKED OFF ANY PROBLEMS, HOW DIFFICULT HAVE THESE PROBLEMS MADE IT FOR YOU TO DO YOUR WORK, TAKE CARE OF THINGS AT HOME, OR GET ALONG WITH OTHER PEOPLE: NOT DIFFICULT AT ALL
SUM OF ALL RESPONSES TO PHQ QUESTIONS 1-9: 4

## 2023-04-20 ENCOUNTER — OFFICE VISIT (OUTPATIENT)
Dept: FAMILY MEDICINE | Facility: CLINIC | Age: 48
End: 2023-04-20
Payer: COMMERCIAL

## 2023-04-20 VITALS
HEART RATE: 65 BPM | TEMPERATURE: 97.9 F | WEIGHT: 315 LBS | RESPIRATION RATE: 16 BRPM | SYSTOLIC BLOOD PRESSURE: 132 MMHG | DIASTOLIC BLOOD PRESSURE: 88 MMHG | BODY MASS INDEX: 40.43 KG/M2 | OXYGEN SATURATION: 95 % | HEIGHT: 74 IN

## 2023-04-20 DIAGNOSIS — I82.441 ACUTE DEEP VEIN THROMBOSIS (DVT) OF TIBIAL VEIN OF RIGHT LOWER EXTREMITY (H): Primary | ICD-10-CM

## 2023-04-20 DIAGNOSIS — I10 ESSENTIAL HYPERTENSION: ICD-10-CM

## 2023-04-20 DIAGNOSIS — D68.51 FACTOR V LEIDEN (H): ICD-10-CM

## 2023-04-20 DIAGNOSIS — M54.50 CHRONIC LOW BACK PAIN WITHOUT SCIATICA, UNSPECIFIED BACK PAIN LATERALITY: ICD-10-CM

## 2023-04-20 DIAGNOSIS — G89.29 CHRONIC LOW BACK PAIN WITHOUT SCIATICA, UNSPECIFIED BACK PAIN LATERALITY: ICD-10-CM

## 2023-04-20 DIAGNOSIS — E66.01 MORBID OBESITY (H): ICD-10-CM

## 2023-04-20 DIAGNOSIS — F41.1 ANXIETY STATE: ICD-10-CM

## 2023-04-20 PROCEDURE — 99214 OFFICE O/P EST MOD 30 MIN: CPT | Performed by: PHYSICIAN ASSISTANT

## 2023-04-20 RX ORDER — HYDROCHLOROTHIAZIDE 25 MG/1
25 TABLET ORAL DAILY
Qty: 90 TABLET | Refills: 1 | Status: SHIPPED | OUTPATIENT
Start: 2023-04-20 | End: 2023-12-04

## 2023-04-20 RX ORDER — CITALOPRAM HYDROBROMIDE 20 MG/1
20 TABLET ORAL DAILY
Qty: 90 TABLET | Refills: 2 | Status: SHIPPED | OUTPATIENT
Start: 2023-04-20 | End: 2024-02-18

## 2023-04-20 RX ORDER — AMLODIPINE BESYLATE 10 MG/1
10 TABLET ORAL DAILY
Qty: 90 TABLET | Refills: 1 | Status: SHIPPED | OUTPATIENT
Start: 2023-04-20 | End: 2024-01-02

## 2023-04-20 ASSESSMENT — PATIENT HEALTH QUESTIONNAIRE - PHQ9
SUM OF ALL RESPONSES TO PHQ QUESTIONS 1-9: 4
10. IF YOU CHECKED OFF ANY PROBLEMS, HOW DIFFICULT HAVE THESE PROBLEMS MADE IT FOR YOU TO DO YOUR WORK, TAKE CARE OF THINGS AT HOME, OR GET ALONG WITH OTHER PEOPLE: NOT DIFFICULT AT ALL

## 2023-04-20 ASSESSMENT — PAIN SCALES - GENERAL: PAINLEVEL: NO PAIN (0)

## 2023-04-20 ASSESSMENT — ANXIETY QUESTIONNAIRES: GAD7 TOTAL SCORE: 0

## 2023-04-20 NOTE — PROGRESS NOTES
"  Assessment & Plan     Acute deep vein thrombosis (DVT) of tibial vein of right lower extremity (H)  Provoked after long travel though with hx of Factor V; on and tolerating eliquis. Will have him consult with heme as well; discussed compression stockings and strategies during future long drives.   - Adult Oncology/Hematology  Referral; Future  - apixaban ANTICOAGULANT (ELIQUIS) 5 MG tablet; Take 1 tablet (5 mg) by mouth 2 times daily    Morbid obesity (H)  Slightly up from last check. Adding to risk; continue focus on diet/activity    Factor V Leiden (H)  See above    Essential hypertension  Controlled. Continue present management. Consider screening for PA  - amLODIPine (NORVASC) 10 MG tablet; Take 1 tablet (10 mg) by mouth daily  - hydrochlorothiazide (HYDRODIURIL) 25 MG tablet; Take 1 tablet (25 mg) by mouth daily    Anxiety state  Controlled. Continue present management.   - citalopram (CELEXA) 20 MG tablet; Take 1 tablet (20 mg) by mouth daily    Chronic low back pain without sciatica, unspecified back pain laterality  Add TID tylenol and restart exercises         BMI:   Estimated body mass index is 46.61 kg/m  as calculated from the following:    Height as of this encounter: 1.88 m (6' 2\").    Weight as of this encounter: 164.7 kg (363 lb).         Robe Gaston PA-C  [facility] SHIRLEY Fuller is a 48 year old, presenting for the following health issues:  Anxiety, Depression, and Hypertension         View : No data to display.              History of Present Illness       Mental Health Follow-up:  Patient presents to follow-up on Depression & Anxiety.Patient's depression since last visit has been:  Good  The patient is not having other symptoms associated with depression.  Patient's anxiety since last visit has been:  Good  The patient is not having other symptoms associated with anxiety.  Any significant life events: health concerns  Patient is not feeling " "anxious or having panic attacks.  Patient has no concerns about alcohol or drug use.    Hypertension: He presents for follow up of hypertension.  He does check blood pressure  regularly outside of the clinic. Outside blood pressures have been over 140/90. He follows a low salt diet.     He eats 0-1 servings of fruits and vegetables daily.He consumes 1 sweetened beverage(s) daily.He exercises with enough effort to increase his heart rate 9 or less minutes per day.  He exercises with enough effort to increase his heart rate 3 or less days per week.   He is taking medications regularly.    Today's PHQ-9         PHQ-9 Total Score: 4    PHQ-9 Q9 Thoughts of better off dead/self-harm past 2 weeks :   Not at all    How difficult have these problems made it for you to do your work, take care of things at home, or get along with other people: Not difficult at all  Today's JULIUS-7 Score: 0         Jonny Quinones is a 48 year old male who presents today for medication check  Incidentally recently he was found to have a RIGHT tibial vein DVT  Hx of factor V; no prior symptoms  This followed some heavy long distance travel  Had leg pain that progressively worsened  And noted a swollen right calf    Also mentions some recurrent spring allergies    Mentions having a great winter; was feeling great and then had back lock up on him around one month ago      Review of Systems   Constitutional, HEENT, cardiovascular, pulmonary, gi and gu systems are negative, except as otherwise noted.      Objective    /88 (BP Location: Right arm, Patient Position: Sitting, Cuff Size: Adult Large)   Pulse 65   Temp 97.9  F (36.6  C) (Oral)   Resp 16   Ht 1.88 m (6' 2\")   Wt (!) 164.7 kg (363 lb)   SpO2 95%   BMI 46.61 kg/m    Body mass index is 46.61 kg/m .  Physical Exam   GENERAL: healthy, alert and no distress  EYES: Eyes grossly normal to inspection, PERRL and conjunctivae and sclerae normal  HENT: ear canals and TM's normal, nose " and mouth without ulcers or lesions  RESP: lungs clear to auscultation - no rales, rhonchi or wheezes  CV: regular rate and rhythm, normal S1 S2, no S3 or S4, no murmur, click or rub, no peripheral edema and peripheral pulses strong  MS: the right calf is slightly larger in appearance than left and mildly ttp; there is no erythema or warmth

## 2023-05-03 NOTE — PROGRESS NOTES
[facility]  [address] [place] [id number]  Main: [phone], Fax: [phone]    [facility] seen at: [facility] at [address] [place] [address]    Outpatient Visit Note:    Patient: Jonny Quinones  MRN: [id number]  : 1975  DIO: May 9, 2023    Reason for visit:  Acute right posterior tibial vein thrombosis on 2023.     HPI:  Jonny is a 48 year old male with a history of homozygous factor V Leiden mutation, hypertension, who is found to have a right posterior tibial vein thrombosis on 2023, referred by Robe Gaston PA-C of [facility] for consultation.     Jonny has a family history of factor V Leiden mutation with his mother (who was tested after having a stroke in her 50's) and apparently his father  of a fatal pulmonary embolism in 2015 while he was being treated for plantar fasciitis. After his father's death, he had a routine follow up visit with his primary care provider and because of this family history of Factor V Leiden mutation and venous thromboembolism, factor V Leiden mutation testing was done and he was found to have homozygous factor V Leiden mutation. This is documented in [facility] Everywhere.    From what I can tell, he has had no history of DVT or pulmonary embolism until recently on 2023.     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 In fact back at the end of 2023, this happened. He drove 20+ hours one way to [facility] and back to [place] on 2023. Additionally, he recalls that he was sick with a sinus infection prior to the trip to [facility] and the initial prescribed antibiotic did not work and he had to has a different antibiotic prescribed to him while in [facility].     Then around 2023, he started to experience right calf pain and  swelling. This worsened in the following days.     On 4/16/2023, he presented to the emergency department with a 3-4 days history of right leg pain and swelling.   At the time, a venous ultrasound of the right leg was done and showed acute DVT within the right posterior tibial vein. He was started on anticoagulation with apixaban.     ROS:  Denies any bleeding complications. Specifically, no frequent epistaxis. No issues with oral mucosal bleeding. Denies any hematuria or blood in stools. Denies any shortness of breath. No chest pain. No cough. No fever.    Medications:  Current Outpatient Medications   Medication     amLODIPine (NORVASC) 10 MG tablet     apixaban ANTICOAGULANT (ELIQUIS) 5 MG tablet     Cholecalciferol (VITAMIN D3) 50 MCG (2000 UT) CAPS     citalopram (CELEXA) 20 MG tablet     hydrochlorothiazide (HYDRODIURIL) 25 MG tablet     metoprolol succinate ER (TOPROL XL) 100 MG 24 hr tablet     metoprolol succinate ER (TOPROL XL) 50 MG 24 hr tablet     olmesartan (BENICAR) 40 MG tablet     potassium chloride ER (K-TAB) 20 MEQ CR tablet     UNABLE TO FIND     Current Facility-Administered Medications   Medication     lidocaine 1 % injection 0.5 mL     triamcinolone (KENALOG-40) injection 40 mg     Allergies:  Allergies   Allergen Reactions     Allopurinol GI Disturbance     Amoxicillin Other (See Comments)     headaches     Bupropion Other (See Comments)     Seasonal Allergies      Dust, mold, pollen       Venlafaxine Other (See Comments)     PmHx:  Past Medical History:   Diagnosis Date     Abscess of lung (H) 2/5/2019    Overview:  H/O     Anxiety      Depressive disorder      Factor V Leiden (H)      Gout      Hypertension      Pre-diabetes      Sleep apnea      Vitamin D deficiency        Social History:   Patient is a semi-.     Family History:  As above.     He also has one brother at age 48, he has refused to get tested for factor V Leiden mutation. He has no history of DVT/PE. He has a  daughter.     Jonny has a son at 25 years of age. He is at least an obligated carrier of factor V Leiden mutation (heterozygous mutation), assuming that his mother does not have factor V Leiden mutation. He has no history of venous thromboembolism.     Objective:  Vitals: BP (!) 145/85 (BP Location: Right arm, Patient Position: Sitting, Cuff Size: Adult Large)   Pulse 67   Temp 97.7  F (36.5  C) (Oral)   Wt (!) 163.1 kg (359 lb 9.6 oz)   SpO2 95%   BMI 46.17 kg/m    Exam:   He has some mild swelling over his right lower extremity. There is no obvious signs of skin discoloration or obvious venous stasis changes.     Imaging:  Reviewed and are as described above.     Assessment:  In summary, Jonny is a 48 year old male with a history of homozygous factor V Leiden mutation, hypertension, who is found to have a right posterior tibial vein thrombosis on 4/16/2023, referred by Robe Gaston PA-C of [facility] for consultation.     Jonny's right posterior tibial vein thrombosis on 4/16/2023 was a provoked event. This was provoked by his long distance shae to [facility] and back to [place] a few days prior to the development of his right leg DVT symptoms.     Noting also that Jonny has homozygous factor V Leiden mutation and that his father did have a fatal pulmonary embolic event back in 2015.     Diagnosis:    Provoked right tibial vein thrombosis on 4/16/2023.    Homozygous factor V Leiden mutation.    Family history of factor V Leiden mutation.    Family history of fatal pulmonary embolism with his father in 2015.     Plan:  I have a long discussion with Jonny and his wife in regard to our plan and recommendation.     I spent some time today to educate them both on DVT/PE, provoked vs unprovoked venous thromboembolic event and the general approach in regard to anticoagulation therapy and duration. I also educate them about factor V Leiden mutation.     I explained that Factor V  Leiden mutation is a common genetic thrombophilia, about 5% of the general population carries the mutation. Only about 10% of those with the mutation ever develop a thromboembolic event in their lifetime and it is usually associated with other provoking factors. People with homozygous Factor V Leiden Mutation has an 80 fold increase risk of venous thrombosis.     I explain that Jonny's recent right leg DVT was a provoked event and that the current American Society of Hematology (DOROTA) guidelines recommends 3-6 months of anticoagulation therapy. However, Jonny does have homozygous factor V Leiden mutation and that his father did have a fatal pulmonary embolism in 2015, thus given these considerations, one could also consider keeping Jonny on indefinite anticoagulation therapy for secondary pharmacological DVT/PE prophylaxis.     I discuss the risks and benefits of long term anticoagulation therapy to Jonny and his wife today and also answered all their questions to their satisfaction.     If he choose not to stay on indefinite anticoagulation therapy, one could consider episodic pharmacological DVT/PE prophylaxis at times of increase venous thromboembolic risks (I.e. when he takes another long distance shae route).     At this time my plan is as follow:    Continue apixaban at 5 mg PO BID for at least 3 months.     Repeat venous ultrasound of the right leg on or after 7/16/2023.     Come back to clinic to see me 1-2 weeks after repeat ultrasound in July 2023 and discuss his decision about potentially staying on indefinite anticoagulation therapy (at this time, he is leaning towards staying on indefinite anticoagulation therapy).     Will consider obtaining CBC and CMP at this next visit with me if they are not already done.     Patient is instructed to call our clinic if he should experience any unusual bleeding complications or if he should need any invasive or surgical procedures in the future.       Jeovanny MCINTOSH  Gil WEAVER, MPAS  Physician Assistant  [facility].     62 minutes spent by me on the date of the encounter doing chart review, history and exam, documentation and further activities per the note.    Time IN: 09:05  Time OUT: 09:55

## 2023-05-09 ENCOUNTER — OFFICE VISIT (OUTPATIENT)
Dept: HEMATOLOGY | Facility: CLINIC | Age: 48
End: 2023-05-09
Attending: PHYSICIAN ASSISTANT
Payer: COMMERCIAL

## 2023-05-09 VITALS
WEIGHT: 315 LBS | HEART RATE: 67 BPM | TEMPERATURE: 97.7 F | SYSTOLIC BLOOD PRESSURE: 145 MMHG | OXYGEN SATURATION: 95 % | DIASTOLIC BLOOD PRESSURE: 85 MMHG | BODY MASS INDEX: 46.17 KG/M2

## 2023-05-09 DIAGNOSIS — I82.441 ACUTE DEEP VEIN THROMBOSIS (DVT) OF TIBIAL VEIN OF RIGHT LOWER EXTREMITY (H): ICD-10-CM

## 2023-05-09 DIAGNOSIS — D68.51 HOMOZYGOUS FACTOR V LEIDEN MUTATION (H): Primary | ICD-10-CM

## 2023-05-09 DIAGNOSIS — Z83.2 FAMILY HISTORY OF FACTOR V LEIDEN MUTATION: ICD-10-CM

## 2023-05-09 DIAGNOSIS — Z82.49 FAMILY HISTORY OF DVT: ICD-10-CM

## 2023-05-09 PROCEDURE — 99215 OFFICE O/P EST HI 40 MIN: CPT | Performed by: PHYSICIAN ASSISTANT

## 2023-05-09 PROCEDURE — 99211 OFF/OP EST MAY X REQ PHY/QHP: CPT | Performed by: PHYSICIAN ASSISTANT

## 2023-05-09 NOTE — LETTER
[facility]  [address] [place] [address]  Main: [phone], Fax: [phone]    Patient seen at: [facility] at [address] [place] [address]    Outpatient Visit Note:    Patient: Jonny Quinones  MRN: [id number]  : 1975  DIO: May 9, 2023    Reason for visit:  Acute right posterior tibial vein thrombosis on 2023.     HPI:  Jonny is a 48 year old male with a history of homozygous factor V Leiden mutation, hypertension, who is found to have a right posterior tibial vein thrombosis on 2023, referred by Robe Gaston PA-C of [facility] for consultation.     Jonny has a family history of factor V Leiden mutation with his mother (who was tested after having a stroke in her 50's) and apparently his father  of a fatal pulmonary embolism in 2015 while he was being treated for plantar fasciitis. After his father's death, he had a routine follow up visit with his primary care provider and because of this family history of Factor V Leiden mutation and venous thromboembolism, factor V Leiden mutation testing was done and he was found to have homozygous factor V Leiden mutation. This is documented in [facility] Everywhere.    From what I can tell, he has had no history of DVT or pulmonary embolism until recently on 2023.     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 In fact back at the end of 2023, this happened. He drove 20+ hours one way to [facility] and back to [place] on 2023. Additionally, he recalls that he was sick with a sinus infection prior to the trip to [facility] and the initial prescribed antibiotic did not work and he had to has a different antibiotic prescribed to him while in [facility].     Then around 2023, he started to experience right calf pain  and swelling. This worsened in the following days.     On 4/16/2023, he presented to the emergency department with a 3-4 days history of right leg pain and swelling.   At the time, a venous ultrasound of the right leg was done and showed acute DVT within the right posterior tibial vein. He was started on anticoagulation with apixaban.     ROS:  Denies any bleeding complications. Specifically, no frequent epistaxis. No issues with oral mucosal bleeding. Denies any hematuria or blood in stools. Denies any shortness of breath. No chest pain. No cough. No fever.    Medications:  Current Outpatient Medications   Medication     amLODIPine (NORVASC) 10 MG tablet     apixaban ANTICOAGULANT (ELIQUIS) 5 MG tablet     Cholecalciferol (VITAMIN D3) 50 MCG (2000 UT) CAPS     citalopram (CELEXA) 20 MG tablet     hydrochlorothiazide (HYDRODIURIL) 25 MG tablet     metoprolol succinate ER (TOPROL XL) 100 MG 24 hr tablet     metoprolol succinate ER (TOPROL XL) 50 MG 24 hr tablet     olmesartan (BENICAR) 40 MG tablet     potassium chloride ER (K-TAB) 20 MEQ CR tablet     UNABLE TO FIND     Current Facility-Administered Medications   Medication     lidocaine 1 % injection 0.5 mL     triamcinolone (KENALOG-40) injection 40 mg     Allergies:  Allergies   Allergen Reactions     Allopurinol GI Disturbance     Amoxicillin Other (See Comments)     headaches     Bupropion Other (See Comments)     Seasonal Allergies      Dust, mold, pollen       Venlafaxine Other (See Comments)     PmHx:  Past Medical History:   Diagnosis Date     Abscess of lung (H) 2/5/2019    Overview:  H/O     Anxiety      Depressive disorder      Factor V Leiden (H)      Gout      Hypertension      Pre-diabetes      Sleep apnea      Vitamin D deficiency        Social History:   Patient is a semi-.     Family History:  As above.     He also has one brother at age 48, he has refused to get tested for factor V Leiden mutation. He has no history of DVT/PE. He has a  daughter.     Jonny has a son at 25 years of age. He is at least an obligated carrier of factor V Leiden mutation (heterozygous mutation), assuming that his mother does not have factor V Leiden mutation. He has no history of venous thromboembolism.     Objective:  Vitals: BP (!) 145/85 (BP Location: Right arm, Patient Position: Sitting, Cuff Size: Adult Large)   Pulse 67   Temp 97.7  F (36.5  C) (Oral)   Wt (!) 163.1 kg (359 lb 9.6 oz)   SpO2 95%   BMI 46.17 kg/m    Exam:   He has some mild swelling over his right lower extremity. There is no obvious signs of skin discoloration or obvious venous stasis changes.     Imaging:  Reviewed and are as described above.     Assessment:  In summary, Jonny is a 48 year old male with a history of homozygous factor V Leiden mutation, hypertension, who is found to have a right posterior tibial vein thrombosis on 4/16/2023, referred by Robe Gaston PA-C of [facility] for consultation.     Jonny's right posterior tibial vein thrombosis on 4/16/2023 was a provoked event. This was provoked by his long distance shae to [facility] and back to [place] a few days prior to the development of his right leg DVT symptoms.     Noting also that Jonny has homozygous factor V Leiden mutation and that his father did have a fatal pulmonary embolic event back in 2015.     Diagnosis:    Provoked right tibial vein thrombosis on 4/16/2023.    Homozygous factor V Leiden mutation.    Family history of factor V Leiden mutation.    Family history of fatal pulmonary embolism with his father in 2015.     Plan:  I have a long discussion with Jonny and his wife in regard to our plan and recommendation.     I spent some time today to educate them both on DVT/PE, provoked vs unprovoked venous thromboembolic event and the general approach in regard to anticoagulation therapy and duration. I also educate them about factor V Leiden mutation.     I explained that Factor V  Leiden mutation is a common genetic thrombophilia, about 5% of the general population carries the mutation. Only about 10% of those with the mutation ever develop a thromboembolic event in their lifetime and it is usually associated with other provoking factors. People with homozygous Factor V Leiden Mutation has an 80 fold increase risk of venous thrombosis.     I explain that Jonny's recent right leg DVT was a provoked event and that the current American Society of Hematology (DOROTA) guidelines recommends 3-6 months of anticoagulation therapy. However, Jonny does have homozygous factor V Leiden mutation and that his father did have a fatal pulmonary embolism in 2015, thus given these considerations, one could also consider keeping Jonny on indefinite anticoagulation therapy for secondary pharmacological DVT/PE prophylaxis.     I discuss the risks and benefits of long term anticoagulation therapy to Jonny and his wife today and also answered all their questions to their satisfaction.     If he choose not to stay on indefinite anticoagulation therapy, one could consider episodic pharmacological DVT/PE prophylaxis at times of increase venous thromboembolic risks (I.e. when he takes another long distance shae route).     At this time my plan is as follow:    Continue apixaban at 5 mg PO BID for at least 3 months.     Repeat venous ultrasound of the right leg on or after 7/16/2023.     Come back to clinic to see me 1-2 weeks after repeat ultrasound in July 2023 and discuss his decision about potentially staying on indefinite anticoagulation therapy (at this time, he is leaning towards staying on indefinite anticoagulation therapy).     Will consider obtaining CBC and CMP at this next visit with me if they are not already done.     Patient is instructed to call our clinic if he should experience any unusual bleeding complications or if he should need any invasive or surgical procedures in the future.       Jeovanny MCINTOSH  Gil WEAVER, MPAS  Physician Assistant  [facility].     62 minutes spent by me on the date of the encounter doing chart review, history and exam, documentation and further activities per the note.    Time IN: 09:05  Time OUT: 09:55

## 2023-06-01 ENCOUNTER — HEALTH MAINTENANCE LETTER (OUTPATIENT)
Age: 48
End: 2023-06-01

## 2023-06-13 DIAGNOSIS — I10 ESSENTIAL HYPERTENSION: ICD-10-CM

## 2023-06-15 RX ORDER — OLMESARTAN MEDOXOMIL 40 MG/1
TABLET ORAL
Qty: 90 TABLET | Refills: 0 | Status: SHIPPED | OUTPATIENT
Start: 2023-06-15 | End: 2023-09-28

## 2023-06-15 NOTE — TELEPHONE ENCOUNTER
Routing refill request to provider for review/approval because:  BP Readings from Last 6 Encounters:   05/09/23 (!) 145/85   04/20/23 132/88   09/29/22 (!) 126/95   02/08/22 110/70   01/28/22 136/88   10/01/21 (!) 130/90     Joanne Sheridan RN

## 2023-07-17 ENCOUNTER — HOSPITAL ENCOUNTER (OUTPATIENT)
Dept: ULTRASOUND IMAGING | Facility: CLINIC | Age: 48
Discharge: HOME | End: 2023-07-17
Attending: PHYSICIAN ASSISTANT | Admitting: PHYSICIAN ASSISTANT
Payer: COMMERCIAL

## 2023-07-17 DIAGNOSIS — I82.441 ACUTE DEEP VEIN THROMBOSIS (DVT) OF TIBIAL VEIN OF RIGHT LOWER EXTREMITY (H): ICD-10-CM

## 2023-07-17 DIAGNOSIS — I10 ESSENTIAL HYPERTENSION: ICD-10-CM

## 2023-07-17 DIAGNOSIS — D68.51 HOMOZYGOUS FACTOR V LEIDEN MUTATION (H): ICD-10-CM

## 2023-07-17 DIAGNOSIS — E87.6 HYPOKALEMIA: ICD-10-CM

## 2023-07-17 DIAGNOSIS — Z82.49 FAMILY HISTORY OF DVT: ICD-10-CM

## 2023-07-17 DIAGNOSIS — Z83.2 FAMILY HISTORY OF FACTOR V LEIDEN MUTATION: ICD-10-CM

## 2023-07-17 PROCEDURE — 93971 EXTREMITY STUDY: CPT | Mod: RT

## 2023-07-18 ENCOUNTER — TELEPHONE (OUTPATIENT)
Dept: SLEEP MEDICINE | Facility: CLINIC | Age: 48
End: 2023-07-18
Payer: COMMERCIAL

## 2023-07-18 RX ORDER — METOPROLOL SUCCINATE 50 MG/1
TABLET, EXTENDED RELEASE ORAL
Qty: 90 TABLET | Refills: 0 | Status: SHIPPED | OUTPATIENT
Start: 2023-07-18 | End: 2023-10-20

## 2023-07-18 RX ORDER — METOPROLOL SUCCINATE 100 MG/1
TABLET, EXTENDED RELEASE ORAL
Qty: 90 TABLET | Refills: 0 | Status: SHIPPED | OUTPATIENT
Start: 2023-07-18 | End: 2023-10-20

## 2023-07-18 RX ORDER — POTASSIUM CHLORIDE 1500 MG/1
TABLET, EXTENDED RELEASE ORAL
Qty: 90 TABLET | Refills: 1 | Status: SHIPPED | OUTPATIENT
Start: 2023-07-18 | End: 2024-01-17

## 2023-07-18 NOTE — TELEPHONE ENCOUNTER
Called patient and spoke with his spouse Nayana. Informed her patient will need a follow up visit if still interested in getting a replacement CPAP. Nayana will call the sleep center to schedule.

## 2023-07-18 NOTE — TELEPHONE ENCOUNTER
Routing refill request to provider for review/approval because:  BP Readings from Last 3 Encounters:   05/09/23 (!) 145/85   04/20/23 132/88   09/29/22 (!) 126/95

## 2023-07-21 NOTE — PROGRESS NOTES
[facility]  [address] [place] [id number]  Main: [phone], Fax: [phone]    Patient seen at: [facility] at [address] [place] [address]    Outpatient Visit Note:    Patient: Jonny Quinones  MRN: [id number]  : 1975  DIO: 2023    Reason for visit:  Acute right posterior tibial vein thrombosis on 2023.      Clinical History Summary:  Jonny is a 48 year old male with a history of homozygous factor V Leiden mutation, hypertension, who is found to have a right posterior tibial vein thrombosis on 2023, currently on apixaban at 5 mg PO BID dosing, returns to clinic today for his follow up visit after he was last seen by this writer on 2023.     Thrombosis History Summary:  Jonny has a family history of factor V Leiden mutation with his mother (who was tested after having a stroke in her 50's) and apparently his father  of a fatal pulmonary embolism in 2015 while he was being treated for plantar fasciitis.   After his father's death, he had a routine follow up with his primary care provider and because of his family history, Jonny at the time underwent factor V Leiden mutation testing and he was found to have homozygous factor V Leiden mutation.   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 In addition to the drive, he recalls that he was sick with a sinus infection prior to his trip to [place].   2023, he started to experience some right calf pain and swelling that worsened in the following days.   2023, he presented to the emergency department with a 3-4 days history of right leg pain and swelling. Venous ultrasound of the right leg was done and showed acute DVT within the right posterior tibial  "vein. He was placed on apixaban and discharged.      Interim History:  He established care with this writer on 5/9/2023. Recommended that he should stay on full intensity anticoagulation therapy for at least 3 months and then return to see me to discuss future VTE prevention strategy.   5/10/2023, he presented to the emergency department with epistaxis. With conservative measures, his epistaxis did stop.   7/17/2023, repeat right leg venous ultrasound showed no acute DVT and likely chronic changes of previous DVT in the posterior tibial veins of the calf.   About 3 weeks ago (in early July 2023) he was in [facility] where he had another episode of epistaxis. At the time, he has been using Flonase on a regular basis and also sometimes using it twice daily for allergies symptoms. He recalls that he also has some headaches and GI upset symptoms. After this episode of epistaxis, he has stopped taking his Flonase and has had no further recurrent epistaxis since.     He is currently on apixaban at 5 mg PO BID dosing. Other than his reported episodes of epistaxis, he has no other bleeding issues.     ROS:  Denies any bleeding complications. Specifically, no frequent epistaxis. No issues with oral mucosal bleeding. Denies any hematuria or blood in stools. Denies any shortness of breath. No chest pain. No cough. No fever.    Medications, Allergies and PmHx:  All are reviewed by this writer today via electronic medical records    Social History:   Deferred.    Family History:  Deferred.    Objective:  Vitals: BP (!) 146/100   Pulse 52   Temp 97.6  F (36.4  C) (Oral)   Ht 1.88 m (6' 2\")   Wt (!) 164.6 kg (362 lb 14.4 oz)   SpO2 99%   BMI 46.59 kg/m    Exam:   Complete exam is not performed today.     Labs:  CBC done at [facility] on 5/10/2023:  WBC 7.7; Hgb 15.9;     Imaging:  Reviewed and is as described above.     Assessment:  In summary, Jonny is a 48 year old male with a history of homozygous factor V " Leiden mutation, hypertension, who is found to have a right posterior tibial vein thrombosis on 4/16/2023, referred by Robe Gaston PA-C of [facility] for consultation.      Jonny's right posterior tibial vein thrombosis on 4/16/2023 was a provoked event. This was provoked by his long distance shae to [facility] and back to [place] a few days prior to the development of his right leg DVT symptoms.      Noting also that Jonny has homozygous factor V Leiden mutation and that his father did have a fatal pulmonary embolic event back in 2015.      Diagnosis:  Provoked right tibial vein thrombosis on 4/16/2023.  Homozygous factor V Leiden mutation.  Family history of factor V Leiden mutation.  Family history of fatal pulmonary embolism with his father in 2015.     Plan:  Other than some epistaxis that seems to have been related to his chronic nasal steroid use, he has done well with apixaban for the past several months. His repeat ultrasound of the right leg on 7/17/2023 did show some residual thrombus of the posterior tibial vein. Jonny and his wife are incline to stay on indefinite anticoagulation therapy given his family history and the fact that he has homozygous factor V Leiden mutation. However, he is wanting to switch his apixaban to rivaroxaban for daily dosing regimen, which is very reasonable.     He is instructed to complete his on hand supply of apixaban (about 1 month left) and then switch to rivaroxaban at 20 mg PO Qday. I will keep him on full therapeutic dose for secondary pharmacological DVT/PE prophylaxis as he does have homozygous factor V Leiden mutation. Will consider decreasing his dose to prophylactic intensity of 10 mg PO Qday if he should have any unusual bleeding complications. I have sent in a prescription of rivaroxaban at 20 mg PO Qday dosing with one year refill to our clinic's pharmacy today.     Patient is instructed to call our clinic if he should experience  any unusual bleeding complications or if he should need any invasive or surgical procedures in the future. Otherwise, will plan to see him back in one year for routine follow up.       Jeovanny Cordero PA-C, MPAS  Physician Assistant  [facility].     30 minutes spent by me on the date of the encounter doing chart review, history and exam, documentation and further activities per the note.    Time IN: 10:05  Time OUT: 10:35

## 2023-07-25 ENCOUNTER — OFFICE VISIT (OUTPATIENT)
Dept: HEMATOLOGY | Facility: CLINIC | Age: 48
End: 2023-07-25
Attending: PHYSICIAN ASSISTANT
Payer: COMMERCIAL

## 2023-07-25 VITALS
HEIGHT: 74 IN | WEIGHT: 315 LBS | HEART RATE: 52 BPM | DIASTOLIC BLOOD PRESSURE: 100 MMHG | TEMPERATURE: 97.6 F | BODY MASS INDEX: 40.43 KG/M2 | SYSTOLIC BLOOD PRESSURE: 146 MMHG | OXYGEN SATURATION: 99 %

## 2023-07-25 DIAGNOSIS — I82.441 ACUTE DEEP VEIN THROMBOSIS (DVT) OF TIBIAL VEIN OF RIGHT LOWER EXTREMITY (H): Primary | ICD-10-CM

## 2023-07-25 DIAGNOSIS — Z83.2 FAMILY HISTORY OF FACTOR V LEIDEN MUTATION: ICD-10-CM

## 2023-07-25 DIAGNOSIS — Z82.49 FAMILY HISTORY OF DVT: ICD-10-CM

## 2023-07-25 DIAGNOSIS — D68.51 HOMOZYGOUS FACTOR V LEIDEN MUTATION (H): ICD-10-CM

## 2023-07-25 LAB
ALBUMIN SERPL BCG-MCNC: 4 G/DL (ref 3.5–5.2)
ALP SERPL-CCNC: 68 U/L (ref 40–129)
ALT SERPL W P-5'-P-CCNC: 35 U/L (ref 0–70)
ANION GAP SERPL CALCULATED.3IONS-SCNC: 10 MMOL/L (ref 7–15)
AST SERPL W P-5'-P-CCNC: 25 U/L (ref 0–45)
BILIRUB SERPL-MCNC: 0.5 MG/DL
BUN SERPL-MCNC: 16.4 MG/DL (ref 6–20)
CALCIUM SERPL-MCNC: 9 MG/DL (ref 8.6–10)
CHLORIDE SERPL-SCNC: 104 MMOL/L (ref 98–107)
CREAT SERPL-MCNC: 0.96 MG/DL (ref 0.67–1.17)
DEPRECATED HCO3 PLAS-SCNC: 29 MMOL/L (ref 22–29)
GFR SERPL CREATININE-BSD FRML MDRD: >90 ML/MIN/1.73M2
GLUCOSE SERPL-MCNC: 90 MG/DL (ref 70–99)
POTASSIUM SERPL-SCNC: 3.3 MMOL/L (ref 3.4–5.3)
PROT SERPL-MCNC: 6.9 G/DL (ref 6.4–8.3)
SODIUM SERPL-SCNC: 143 MMOL/L (ref 136–145)

## 2023-07-25 PROCEDURE — 80053 COMPREHEN METABOLIC PANEL: CPT | Performed by: PHYSICIAN ASSISTANT

## 2023-07-25 PROCEDURE — 99213 OFFICE O/P EST LOW 20 MIN: CPT | Performed by: PHYSICIAN ASSISTANT

## 2023-07-25 PROCEDURE — 99214 OFFICE O/P EST MOD 30 MIN: CPT | Performed by: PHYSICIAN ASSISTANT

## 2023-07-25 PROCEDURE — 36415 COLL VENOUS BLD VENIPUNCTURE: CPT | Performed by: PHYSICIAN ASSISTANT

## 2023-07-25 NOTE — PATIENT INSTRUCTIONS
[facility]  [facility] for [facility]ot[facility]  [facility] [address], [place] [address]  Main: [phone], Fax: [phone]    It was a pleasure seeing you today.  Thank you for allowing us to be involved in your care. Please let us know if there is anything else we can do for you, so that we can be sure you are leaving completely satisfied with your care experience.    Labs today.  Our lab is located within our clinic space.  We will communicate these to you by MyChart or phone. With your permission we may leave a detailed voicemail, please see below for our contact information should you have any questions about your results. Also, please note that some lab results may take a week or so to get back. Feel free to call or message if you have not heard back from us within 7-10 days.     Please switch to Xarelto.  Please call us with any bleeding issues that you may have.    Wear compression stockings if you have swelling in your leg. Take them off while you are sleeping. You may need to get new stockings every 3-6 months with regular wear.    Patient Resources:   For additional information, please see the following web links:  www.stoptheclot.org, www.clotconnect.org.    Call the [facility]  at [phone].     -If surgeries or procedures are planned (for holding instructions).     -If off anticoagulation, please call during high risk times (long-distance travel, broken bones or trauma, immobilization, surgery, pregnancy, or taking estrogen).     -Any new symptoms of DVT (deep vein thrombosis) or PE (pulmonary embolism)    -pain     -swelling     -redness    -warmth    -shortness of breath    -chest pain    -coughing up blood    We would like a provider on our team to see you at least annually for optimal care and to allow us to continue to prescribe for you.  Jeovanny Cordero PA-C, Kylie Celestin, MPH, PACatherineC  and Tanesha Fang PA-C are our  physician assistants that are specialized in bleeding and clotting disorders that you may be able to see more readily.    Return to clinic in one year.  Please schedule return appointment with Jeovanny Cordero PA-C .    Your nurse clinician is Rosa M Cardona RN, BSN, PCCN [phone].   If she is unavailable and you have immediate concerns, please call [phone] and ask for a nurse.

## 2023-08-21 ENCOUNTER — MYC MEDICAL ADVICE (OUTPATIENT)
Dept: FAMILY MEDICINE | Facility: CLINIC | Age: 48
End: 2023-08-21
Payer: COMMERCIAL

## 2023-08-21 DIAGNOSIS — M71.30 MYXOID CYST: Primary | ICD-10-CM

## 2023-09-14 ENCOUNTER — ANCILLARY PROCEDURE (OUTPATIENT)
Dept: GENERAL RADIOLOGY | Facility: CLINIC | Age: 48
End: 2023-09-14
Attending: PHYSICIAN ASSISTANT
Payer: COMMERCIAL

## 2023-09-14 ENCOUNTER — TELEPHONE (OUTPATIENT)
Dept: ORTHOPEDICS | Facility: CLINIC | Age: 48
End: 2023-09-14

## 2023-09-14 ENCOUNTER — OFFICE VISIT (OUTPATIENT)
Dept: ORTHOPEDICS | Facility: CLINIC | Age: 48
End: 2023-09-14
Attending: PHYSICIAN ASSISTANT
Payer: COMMERCIAL

## 2023-09-14 VITALS
BODY MASS INDEX: 40.43 KG/M2 | HEIGHT: 74 IN | HEART RATE: 54 BPM | DIASTOLIC BLOOD PRESSURE: 97 MMHG | SYSTOLIC BLOOD PRESSURE: 155 MMHG | WEIGHT: 315 LBS

## 2023-09-14 DIAGNOSIS — M71.30 MYXOID CYST: Primary | ICD-10-CM

## 2023-09-14 DIAGNOSIS — M71.30 MYXOID CYST: ICD-10-CM

## 2023-09-14 PROCEDURE — 73130 X-RAY EXAM OF HAND: CPT | Mod: TC | Performed by: RADIOLOGY

## 2023-09-14 PROCEDURE — 99203 OFFICE O/P NEW LOW 30 MIN: CPT | Performed by: PHYSICIAN ASSISTANT

## 2023-09-14 NOTE — TELEPHONE ENCOUNTER
Patient has been scheduled for surgery. Details are below.    Date of Surgery: 11/17/23    Approximate Arrival Time: 8AM    Surgeon:  Dr. Valdivia     Procedure: Excision Mass Finger Right  Location: [facility] [place]-[address] [address]  Surgery Consult: na  PreOp Physical: na-local  PostOp: 11/28/23  Packet Mailed/MyChart Sent: no, gave patient in person  Added to [place]: yes

## 2023-09-14 NOTE — PATIENT INSTRUCTIONS
We will set up surgical removal.    Please contact me with any questions.    Alexis Rios PA-C  [facility] - Surgery

## 2023-09-14 NOTE — PROGRESS NOTES
HISTORY OF PRESENT ILLNESS:    Jonny Quinones is a 48 year old male who is seen in consultation at the request of Robe Gaston PA-C for a myxoid cyst of the hand/wrist.    Pt presents with wife.  Present symptoms:  Symptoms began  5 month(s) ago.  Patient describes injury as pinching/wedging in a motor.  He reports sharp right middle finger pain that is located dorsal side of distal phalanx of the right middle finger; radiation Absent.  Symptoms are quite bothersome, as they get in the way of work, dressing, using the hand. Pain is 8/10 in maximal severity, and 0/10 currently without movement.  Symptoms are generally worse with/at bumping or using finger; better with/at rest, protection.   Overall the patient feels the condition is worsening. Other treatment so far has consisted of popping it at home with expression of fluid with no long term relief as it returns.  Associated symptoms: gelly/ clear liquid.  He denies history of similar or related problems.  No numbness, locking, catching, weakness, traveling redness or pus.  Of NOTE:  pt is factor V Leiden with recent Leg DVT.  Currently on Eliquis, may transition to Xarelto.  Recent blood glucose have been WNL.  Orthopedic PMH: Gout flares previously at knee and toes.     Past Medical History:   Diagnosis Date    Abscess of lung (H) 2/5/2019    Overview:  H/O    Anxiety     Depressive disorder     Factor V Leiden (H)     Gout     Hypertension     Pre-diabetes     Sleep apnea     Vitamin D deficiency        Past Surgical History:   Procedure Laterality Date    athroereisis  03/27/2009    [place]ar    COLONOSCOPY N/A 9/29/2022    Procedure: COLONOSCOPY;  Surgeon: Anoop Dailey MD;  Location:  GI    ENT SURGERY Bilateral 1985    tonsilectomy    LAPAROSCOPIC GASTRIC ADJUSTABLE BANDING  11/2009    SINUS SURGERY  2001    sphincterotomy  02/2009    lateral internal; anal fissure       Family History   Problem Relation Age of Onset    Allergies Mother     Blood  Disease Mother         Leiden Factor 5    Cerebrovascular Disease Mother     Hypertension Father     Hypertension Maternal Grandmother     Peripheral Vascular Disease Maternal Grandmother     Thrombosis Paternal Grandfather     Allergies Brother     Breast Cancer Paternal Aunt     Colon Cancer No family hx of        Social History     Socioeconomic History    Marital status:      Spouse name: Not on file    Number of children: Not on file    Years of education: Not on file    Highest education level: Not on file   Occupational History    Not on file   Tobacco Use    Smoking status: Never     Passive exposure: Never    Smokeless tobacco: Never   Vaping Use    Vaping Use: Never used   Substance and Sexual Activity    Alcohol use: No    Drug use: No    Sexual activity: Yes     Partners: Female     Birth control/protection: I.U.D.   Other Topics Concern    Parent/sibling w/ CABG, MI or angioplasty before 65F 55M? No   Social History Narrative    Not on file     Social Determinants of Health     Financial Resource Strain: Not on file   Food Insecurity: Not on file   Transportation Needs: Not on file   Physical Activity: Not on file   Stress: Not on file   Social Connections: Not on file   Intimate Partner Violence: Not on file   Housing Stability: Not on file       Current Outpatient Medications   Medication Sig Dispense Refill    amLODIPine (NORVASC) 10 MG tablet Take 1 tablet (10 mg) by mouth daily 90 tablet 1    apixaban ANTICOAGULANT (ELIQUIS) 5 MG tablet Take 1 tablet (5 mg) by mouth 2 times daily 180 tablet 0    Cholecalciferol (VITAMIN D3) 50 MCG (2000 UT) CAPS Take 2 capsules by mouth once daily. 180 capsule 0    citalopram (CELEXA) 20 MG tablet Take 1 tablet (20 mg) by mouth daily 90 tablet 2    hydrochlorothiazide (HYDRODIURIL) 25 MG tablet Take 1 tablet (25 mg) by mouth daily 90 tablet 1    metoprolol succinate ER (TOPROL XL) 100 MG 24 hr tablet TAKE 1 TABLET BY MOUTH ONCE DAILY WITH  A  50MG  TABLET   "FOR  150MG  DAILY  DOSE 90 tablet 0    metoprolol succinate ER (TOPROL XL) 50 MG 24 hr tablet TAKE 1 TABLET BY MOUTH ONCE DAILY WITH  A  100MG  TABLET  FOR  150MG  DAILY  DOSE 90 tablet 0    olmesartan (BENICAR) 40 MG tablet Take 1 tablet by mouth once daily 90 tablet 0    potassium chloride ER (K-TAB) 20 MEQ CR tablet Take 1 tablet by mouth once daily 90 tablet 1    rivaroxaban ANTICOAGULANT (XARELTO) 20 MG TABS tablet Take 1 tablet (20 mg) by mouth daily (with dinner) 90 tablet 3    UNABLE TO FIND MEDICATION NAME: CPAP machine         Allergies   Allergen Reactions    Allopurinol GI Disturbance    Amoxicillin Other (See Comments)     headaches    Bupropion Other (See Comments)    Seasonal Allergies      Dust, mold, pollen      Venlafaxine Other (See Comments)       Patient's past medical, surgical, social and family histories are reviewed today.  Medical history includes: Factor V leiden with DVT 4/2023, on eliquis currently, pre diabetic, TREMAINE, HTN.  REVIEW OF SYSTEMS:  CONSTITUTIONAL:  NEGATIVE for fever, chills, change in weight  INTEGUMENTARY/SKIN:  NEGATIVE for worrisome rashes, moles or lesions  EYES:  NEGATIVE for vision changes or irritation  ENT/MOUTH:  NEGATIVE for ear, mouth and throat problems  RESP:  NEGATIVE for significant cough or SOB  BREAST:  NEGATIVE for masses, tenderness or discharge  CV:  NEGATIVE for chest pain, palpitations or peripheral edema  GI:  NEGATIVE for nausea, abdominal pain, heartburn, or change in bowel habits  :  Negative   MUSCULOSKELETAL:  See HPI above  NEURO:  NEGATIVE for weakness, dizziness or paresthesias  ENDOCRINE:  NEGATIVE for temperature intolerance, skin/hair changes  HEME/ALLERGY/IMMUNE:  NEGATIVE for bleeding problems  PSYCHIATRIC:  NEGATIVE for changes in mood or affect      PHYSICAL EXAM:  BP (!) 155/97   Pulse 54   Ht 1.88 m (6' 2\")   Wt (!) 156.9 kg (346 lb)   BMI 44.42 kg/m    Body mass index is 44.42 kg/m .   GENERAL APPEARANCE: healthy, well " nourished. Morbid Obese.   NEURO: he is alert and oriented x 3, mentation intact and speech normal  POSTURE: Stands with normal weight bearing line.  PSYCH:  mentation appears normal and affect normal/bright    MUSCULOSKELETAL: Examination of Right hand, long finger specific.  GROSS OBSERVATION:  Mass at dorsal long finger DIP at radial side.  No erythema, deformity, finger deviation, lesions.  PALPATION: painful hard nodule.  ROM:  long finger DIP flexion mildly restricted.  LIGAMENTOUS:  intact.  STRENGTH:  wNL.  VASCULATURE:  Good capillary refill bilaterally.  SENSATION: INTACT TO LIGHT TOUCH, Otherwise no gross deficits noted.   COORDINATION:  Able to move joint within above stated ROM with good control.    Imaging Interpretation:   XR right hand 3 views.    3 views of the right hand demonstrate no significant degenerative changes, no acute fractures, gouty tophi or other pathology.  Soft tissue mass visualized at the dorsal DIP without calcification or fluid density.    See also radiology reading.    ASSESSMENT:  1. Myxoid cyst    Symptomatic and progressive, resistant resolution to puncture.    PLAN:    1.  Surgical excision under Local with Dr. Valdivia.  2.  Follow up postop.    After discussion of treatment options including watching, splinting, protecting, surgery excision, patient decided to proceed with surgery.    We discussed that there are risks including numbness, scarring, pain, risk of cyst returning, infection.  We discussed that surgery under local he will feel the injection, and the surgical procedure however it should not be painful.  Discussed he may have sutures that would need to be removed postoperatively as well as a protective splint as we do would not want him to be moving the joint for couple weeks after surgery.  Patient is aware and agrees and would like to schedule with Dr. Valdivia and meet him prior to surgery.  Handoff to surgery scheduler.    Alexis Rios PA-C  [facility] - Surgery    A total of 30 minutes spent with patient on care and care coordinating activities.

## 2023-09-14 NOTE — Clinical Note
"    9/14/2023         RE: Jonny Quinones  [address] [id number]        Dear Colleague,    Thank you for referring your patient, Jonny Quinones, to the [facility] [place]. Please see a copy of my visit note below.    HISTORY OF PRESENT ILLNESS:    Jonny Quinones is a 48 year old male who is seen in consultation at the request of Robe Gaston PA-C for a myxoid cyst of the hand/wrist.    Present symptoms:  Symptoms began  3 month(s) ago.  Patient describes injury as pinching/wedging in a motor.  He reports sharp right middle finger pain that is located dorsal side of distal phalanx of the right middle finger; radiation Absent.  Symptoms are { twinging pains whenever bumped}. Pain is 8/10 in maximal severity, and 0/10 currently.  Symptoms are generally worse with/at ***; better with/at ***.   Overall the patient feels the condition is *** worsening. Other treatment so far has consisted of {RELIEVING FACTORS/MSPAIN:775498} with {minimal/moderate/good/excellent:572975::\"minimal\"} relief.  Associated symptoms: gelly/ clear liquid.  He {has HAD:203581} history of similar or related problems.  ***  Orthopedic PMH: ***   Past Medical History:   Diagnosis Date    Abscess of lung (H) 2/5/2019    Overview:  H/O    Anxiety     Depressive disorder     Factor V Leiden (H)     Gout     Hypertension     Pre-diabetes     Sleep apnea     Vitamin D deficiency        Past Surgical History:   Procedure Laterality Date    athroereisis  03/27/2009    DC Subtalar    COLONOSCOPY N/A 9/29/2022    Procedure: COLONOSCOPY;  Surgeon: Anoop Dailey MD;  Location:  GI    ENT SURGERY Bilateral 1985    tonsilectomy    LAPAROSCOPIC GASTRIC ADJUSTABLE BANDING  11/2009    SINUS SURGERY  2001    sphincterotomy  02/2009    lateral internal; anal fissure       Family History   Problem Relation Age of Onset    Allergies Mother     Blood Disease Mother         Leiden Factor 5    Cerebrovascular " Disease Mother     Hypertension Father     Hypertension Maternal Grandmother     Peripheral Vascular Disease Maternal Grandmother     Thrombosis Paternal Grandfather     Allergies Brother     Breast Cancer Paternal Aunt     Colon Cancer No family hx of        Social History     Socioeconomic History    Marital status:      Spouse name: Not on file    Number of children: Not on file    Years of education: Not on file    Highest education level: Not on file   Occupational History    Not on file   Tobacco Use    Smoking status: Never     Passive exposure: Never    Smokeless tobacco: Never   Vaping Use    Vaping Use: Never used   Substance and Sexual Activity    Alcohol use: No    Drug use: No    Sexual activity: Yes     Partners: Female     Birth control/protection: I.U.D.   Other Topics Concern    Parent/sibling w/ CABG, MI or angioplasty before 65F 55M? No   Social History Narrative    Not on file     Social Determinants of Health     Financial Resource Strain: Not on file   Food Insecurity: Not on file   Transportation Needs: Not on file   Physical Activity: Not on file   Stress: Not on file   Social Connections: Not on file   Intimate Partner Violence: Not on file   Housing Stability: Not on file       Current Outpatient Medications   Medication Sig Dispense Refill    amLODIPine (NORVASC) 10 MG tablet Take 1 tablet (10 mg) by mouth daily 90 tablet 1    apixaban ANTICOAGULANT (ELIQUIS) 5 MG tablet Take 1 tablet (5 mg) by mouth 2 times daily 180 tablet 0    Cholecalciferol (VITAMIN D3) 50 MCG (2000 UT) CAPS Take 2 capsules by mouth once daily. 180 capsule 0    citalopram (CELEXA) 20 MG tablet Take 1 tablet (20 mg) by mouth daily 90 tablet 2    hydrochlorothiazide (HYDRODIURIL) 25 MG tablet Take 1 tablet (25 mg) by mouth daily 90 tablet 1    metoprolol succinate ER (TOPROL XL) 100 MG 24 hr tablet TAKE 1 TABLET BY MOUTH ONCE DAILY WITH  A  50MG  TABLET  FOR  150MG  DAILY  DOSE 90 tablet 0    metoprolol succinate  "ER (TOPROL XL) 50 MG 24 hr tablet TAKE 1 TABLET BY MOUTH ONCE DAILY WITH  A  100MG  TABLET  FOR  150MG  DAILY  DOSE 90 tablet 0    olmesartan (BENICAR) 40 MG tablet Take 1 tablet by mouth once daily 90 tablet 0    potassium chloride ER (K-TAB) 20 MEQ CR tablet Take 1 tablet by mouth once daily 90 tablet 1    rivaroxaban ANTICOAGULANT (XARELTO) 20 MG TABS tablet Take 1 tablet (20 mg) by mouth daily (with dinner) 90 tablet 3    UNABLE TO FIND MEDICATION NAME: CPAP machine         Allergies   Allergen Reactions    Allopurinol GI Disturbance    Amoxicillin Other (See Comments)     headaches    Bupropion Other (See Comments)    Seasonal Allergies      Dust, mold, pollen      Venlafaxine Other (See Comments)       Patient's past medical, surgical, social and family histories are reviewed today.  { :702990::\"There are no significant contributory medical issues.\"}  REVIEW OF SYSTEMS:  CONSTITUTIONAL:  NEGATIVE for fever, chills, change in weight  INTEGUMENTARY/SKIN:  NEGATIVE for worrisome rashes, moles or lesions  EYES:  NEGATIVE for vision changes or irritation  ENT/MOUTH:  NEGATIVE for ear, mouth and throat problems  RESP:  NEGATIVE for significant cough or SOB  BREAST:  NEGATIVE for masses, tenderness or discharge  CV:  NEGATIVE for chest pain, palpitations or peripheral edema  GI:  NEGATIVE for nausea, abdominal pain, heartburn, or change in bowel habits  :  Negative   MUSCULOSKELETAL:  See HPI above  NEURO:  NEGATIVE for weakness, dizziness or paresthesias  ENDOCRINE:  NEGATIVE for temperature intolerance, skin/hair changes  HEME/ALLERGY/IMMUNE:  NEGATIVE for bleeding problems  PSYCHIATRIC:  NEGATIVE for changes in mood or affect      PHYSICAL EXAM:  There were no vitals taken for this visit.  There is no height or weight on file to calculate BMI.   GENERAL APPEARANCE: healthy, well nourished. ***   NEURO: *** alert and oriented x 3, mentation intact and speech normal  POSTURE: Stands *** normal weight bearing " line.  PSYCH:  mentation appears normal and affect normal/bright    MUSCULOSKELETAL: Examination of ***.  GROSS OBSERVATION:  ***  PALPATION: ***  ROM:  Right:  ***.  Left: ***  LIGAMENTOUS:  ***  STRENGTH:  ***  SPECIAL TESTS:  ***    CONTRALATERAL JOINT:  no overlying skin change, observable deformity, or effusion; range of motion as noted above; strength and function are within normal limits; no obvious ligamentous instability. ***    SKIN: ***, no lesions, erythema noted bilaterally.  VASCULATURE:  Good capillary refill bilaterally.  SENSATION: *** Otherwise no gross deficits noted.   COORDINATION:  Able to move joint within above stated ROM with good control.    Imaging Interpretation:   ***     ASSESSMENT:  No diagnosis found.  ***  PLAN:    1.  ***  2.  ***    Follow up in clinic in *** weeks or ***.    Alexis Rios PA-C  [facility] - Surgery      HISTORY OF PRESENT ILLNESS:    Jonny Quinones is a 48 year old male who is seen in consultation at the request of Robe Gaston PA-C for a myxoid cyst of the hand/wrist.    Pt presents with wife.  Present symptoms:  Symptoms began  5 month(s) ago.  Patient describes injury as pinching/wedging in a motor.  He reports sharp right middle finger pain that is located dorsal side of distal phalanx of the right middle finger; radiation Absent.  Symptoms are quite bothersome, as they get in the way of work, dressing, using the hand. Pain is 8/10 in maximal severity, and 0/10 currently without movement.  Symptoms are generally worse with/at bumping or using finger; better with/at rest, protection.   Overall the patient feels the condition is worsening. Other treatment so far has consisted of popping it at home with expression of fluid with no long term relief as it returns.  Associated symptoms: gelly/ clear liquid.  He denies history of similar or related problems.  No numbness, locking, catching, weakness, traveling redness or pus.  Of NOTE:   pt is factor V Leiden with recent Leg DVT.  Currently on Eliquis, may transition to Xarelto.  Recent blood glucose have been WNL.  Orthopedic PMH: Gout flares previously at knee and toes.     Past Medical History:   Diagnosis Date     Abscess of lung (H) 2/5/2019    Overview:  H/O     Anxiety      Depressive disorder      Factor V Leiden (H)      Gout      Hypertension      Pre-diabetes      Sleep apnea      Vitamin D deficiency        Past Surgical History:   Procedure Laterality Date     athroereisis  03/27/2009    HI Subtalar     COLONOSCOPY N/A 9/29/2022    Procedure: COLONOSCOPY;  Surgeon: Anoop Dailey MD;  Location:  GI     ENT SURGERY Bilateral 1985    tonsilectomy     LAPAROSCOPIC GASTRIC ADJUSTABLE BANDING  11/2009     SINUS SURGERY  2001     sphincterotomy  02/2009    lateral internal; anal fissure       Family History   Problem Relation Age of Onset     Allergies Mother      Blood Disease Mother         Leiden Factor 5     Cerebrovascular Disease Mother      Hypertension Father      Hypertension Maternal Grandmother      Peripheral Vascular Disease Maternal Grandmother      Thrombosis Paternal Grandfather      Allergies Brother      Breast Cancer Paternal Aunt      Colon Cancer No family hx of        Social History     Socioeconomic History     Marital status:      Spouse name: Not on file     Number of children: Not on file     Years of education: Not on file     Highest education level: Not on file   Occupational History     Not on file   Tobacco Use     Smoking status: Never     Passive exposure: Never     Smokeless tobacco: Never   Vaping Use     Vaping Use: Never used   Substance and Sexual Activity     Alcohol use: No     Drug use: No     Sexual activity: Yes     Partners: Female     Birth control/protection: I.U.D.   Other Topics Concern     Parent/sibling w/ CABG, MI or angioplasty before 65F 55M? No   Social History Narrative     Not on file     Social Determinants of Health      Financial Resource Strain: Not on file   Food Insecurity: Not on file   Transportation Needs: Not on file   Physical Activity: Not on file   Stress: Not on file   Social Connections: Not on file   Intimate Partner Violence: Not on file   Housing Stability: Not on file       Current Outpatient Medications   Medication Sig Dispense Refill     amLODIPine (NORVASC) 10 MG tablet Take 1 tablet (10 mg) by mouth daily 90 tablet 1     apixaban ANTICOAGULANT (ELIQUIS) 5 MG tablet Take 1 tablet (5 mg) by mouth 2 times daily 180 tablet 0     Cholecalciferol (VITAMIN D3) 50 MCG (2000 UT) CAPS Take 2 capsules by mouth once daily. 180 capsule 0     citalopram (CELEXA) 20 MG tablet Take 1 tablet (20 mg) by mouth daily 90 tablet 2     hydrochlorothiazide (HYDRODIURIL) 25 MG tablet Take 1 tablet (25 mg) by mouth daily 90 tablet 1     metoprolol succinate ER (TOPROL XL) 100 MG 24 hr tablet TAKE 1 TABLET BY MOUTH ONCE DAILY WITH  A  50MG  TABLET  FOR  150MG  DAILY  DOSE 90 tablet 0     metoprolol succinate ER (TOPROL XL) 50 MG 24 hr tablet TAKE 1 TABLET BY MOUTH ONCE DAILY WITH  A  100MG  TABLET  FOR  150MG  DAILY  DOSE 90 tablet 0     olmesartan (BENICAR) 40 MG tablet Take 1 tablet by mouth once daily 90 tablet 0     potassium chloride ER (K-TAB) 20 MEQ CR tablet Take 1 tablet by mouth once daily 90 tablet 1     rivaroxaban ANTICOAGULANT (XARELTO) 20 MG TABS tablet Take 1 tablet (20 mg) by mouth daily (with dinner) 90 tablet 3     UNABLE TO FIND MEDICATION NAME: CPAP machine         Allergies   Allergen Reactions     Allopurinol GI Disturbance     Amoxicillin Other (See Comments)     headaches     Bupropion Other (See Comments)     Seasonal Allergies      Dust, mold, pollen       Venlafaxine Other (See Comments)       Patient's past medical, surgical, social and family histories are reviewed today.  Medical history includes: Factor V leiden with DVT 4/2023, on eliquis currently, pre diabetic, TREMAINE, HTN.  REVIEW OF  "SYSTEMS:  CONSTITUTIONAL:  NEGATIVE for fever, chills, change in weight  INTEGUMENTARY/SKIN:  NEGATIVE for worrisome rashes, moles or lesions  EYES:  NEGATIVE for vision changes or irritation  ENT/MOUTH:  NEGATIVE for ear, mouth and throat problems  RESP:  NEGATIVE for significant cough or SOB  BREAST:  NEGATIVE for masses, tenderness or discharge  CV:  NEGATIVE for chest pain, palpitations or peripheral edema  GI:  NEGATIVE for nausea, abdominal pain, heartburn, or change in bowel habits  :  Negative   MUSCULOSKELETAL:  See HPI above  NEURO:  NEGATIVE for weakness, dizziness or paresthesias  ENDOCRINE:  NEGATIVE for temperature intolerance, skin/hair changes  HEME/ALLERGY/IMMUNE:  NEGATIVE for bleeding problems  PSYCHIATRIC:  NEGATIVE for changes in mood or affect      PHYSICAL EXAM:  BP (!) 155/97   Pulse 54   Ht 1.88 m (6' 2\")   Wt (!) 156.9 kg (346 lb)   BMI 44.42 kg/m    Body mass index is 44.42 kg/m .   GENERAL APPEARANCE: healthy, well nourished. Morbid Obese.   NEURO: he is alert and oriented x 3, mentation intact and speech normal  POSTURE: Stands with normal weight bearing line.  PSYCH:  mentation appears normal and affect normal/bright    MUSCULOSKELETAL: Examination of Right hand, long finger specific.  GROSS OBSERVATION:  Mass at dorsal long finger DIP at radial side.  No erythema, deformity, finger deviation, lesions.  PALPATION: painful hard nodule.  ROM:  long finger DIP flexion mildly restricted.  LIGAMENTOUS:  intact.  STRENGTH:  wNL.  VASCULATURE:  Good capillary refill bilaterally.  SENSATION: INTACT TO LIGHT TOUCH, Otherwise no gross deficits noted.   COORDINATION:  Able to move joint within above stated ROM with good control.    Imaging Interpretation:   XR right hand 3 views.         ASSESSMENT:  1. Myxoid cyst      ***  PLAN:    1.  ***  2.  ***    Follow up in clinic in *** weeks or ***.    Alexis Rios PA-C  [facility] - Surgery        Again, thank you for " allowing me to participate in the care of your patient.        Sincerely,        Alexis Rios PA-C

## 2023-09-28 ENCOUNTER — MYC REFILL (OUTPATIENT)
Dept: FAMILY MEDICINE | Facility: CLINIC | Age: 48
End: 2023-09-28
Payer: COMMERCIAL

## 2023-09-28 DIAGNOSIS — I10 ESSENTIAL HYPERTENSION: ICD-10-CM

## 2023-09-29 RX ORDER — OLMESARTAN MEDOXOMIL 40 MG/1
TABLET ORAL
Qty: 90 TABLET | Refills: 0 | OUTPATIENT
Start: 2023-09-29

## 2023-09-29 RX ORDER — OLMESARTAN MEDOXOMIL 40 MG/1
40 TABLET ORAL DAILY
Qty: 90 TABLET | Refills: 0 | Status: SHIPPED | OUTPATIENT
Start: 2023-09-29 | End: 2024-01-02

## 2023-09-29 NOTE — TELEPHONE ENCOUNTER
Routing refill request to provider for review/approval because:  Labs out of range:  K+  Failing bp    Crystal Glynn RN

## 2023-10-18 DIAGNOSIS — I10 ESSENTIAL HYPERTENSION: ICD-10-CM

## 2023-10-20 RX ORDER — METOPROLOL SUCCINATE 50 MG/1
TABLET, EXTENDED RELEASE ORAL
Qty: 90 TABLET | Refills: 0 | Status: SHIPPED | OUTPATIENT
Start: 2023-10-20 | End: 2024-01-17

## 2023-10-20 RX ORDER — METOPROLOL SUCCINATE 100 MG/1
TABLET, EXTENDED RELEASE ORAL
Qty: 90 TABLET | Refills: 0 | Status: SHIPPED | OUTPATIENT
Start: 2023-10-20 | End: 2024-01-17

## 2023-11-17 ENCOUNTER — HOSPITAL ENCOUNTER (OUTPATIENT)
Facility: AMBULATORY SURGERY CENTER | Age: 48
Discharge: HOME | End: 2023-11-17
Attending: STUDENT IN AN ORGANIZED HEALTH CARE EDUCATION/TRAINING PROGRAM | Admitting: STUDENT IN AN ORGANIZED HEALTH CARE EDUCATION/TRAINING PROGRAM
Payer: COMMERCIAL

## 2023-11-17 VITALS
TEMPERATURE: 97 F | SYSTOLIC BLOOD PRESSURE: 138 MMHG | DIASTOLIC BLOOD PRESSURE: 98 MMHG | RESPIRATION RATE: 18 BRPM | WEIGHT: 315 LBS | HEART RATE: 55 BPM | BODY MASS INDEX: 40.43 KG/M2 | HEIGHT: 74 IN | OXYGEN SATURATION: 95 %

## 2023-11-17 DIAGNOSIS — M71.30 MYXOID CYST: Primary | ICD-10-CM

## 2023-11-17 PROCEDURE — 88304 TISSUE EXAM BY PATHOLOGIST: CPT | Mod: 26 | Performed by: PATHOLOGY

## 2023-11-17 PROCEDURE — 88304 TISSUE EXAM BY PATHOLOGIST: CPT | Mod: TC | Performed by: STUDENT IN AN ORGANIZED HEALTH CARE EDUCATION/TRAINING PROGRAM

## 2023-11-17 PROCEDURE — 26160 REMOVE TENDON SHEATH LESION: CPT | Mod: RT

## 2023-11-17 RX ORDER — OXYCODONE HYDROCHLORIDE 5 MG/1
5 TABLET ORAL EVERY 6 HOURS PRN
Qty: 3 TABLET | Refills: 0 | Status: SHIPPED | OUTPATIENT
Start: 2023-11-17 | End: 2023-11-20

## 2023-11-17 RX ORDER — MAGNESIUM HYDROXIDE 1200 MG/15ML
LIQUID ORAL PRN
Status: DISCONTINUED | OUTPATIENT
Start: 2023-11-17 | End: 2023-11-17 | Stop reason: HOSPADM

## 2023-11-17 RX ORDER — BUPIVACAINE HYDROCHLORIDE 5 MG/ML
5 INJECTION, SOLUTION EPIDURAL; INTRACAUDAL ONCE
Status: DISCONTINUED | OUTPATIENT
Start: 2023-11-17 | End: 2023-11-18 | Stop reason: HOSPADM

## 2023-11-17 RX ORDER — LIDOCAINE HYDROCHLORIDE 10 MG/ML
5 INJECTION, SOLUTION EPIDURAL; INFILTRATION; INTRACAUDAL; PERINEURAL ONCE
Status: DISCONTINUED | OUTPATIENT
Start: 2023-11-17 | End: 2023-11-18 | Stop reason: HOSPADM

## 2023-11-17 NOTE — OP NOTE
Patient: Jonny Quinones  : 1975  MRN: [id number]    DATE OF OPERATION: 2023      OPERATIVE REPORT       PREOPERATIVE DIAGNOSIS:  Digital mucous cyst right middle finger     POSTOPERATIVE DIAGNOSIS:  Digital mucous cyst right middle finger     PROCEDURE:  Right middle finger mucous cyst excision    SURGEON:  Deep Valdivia MD     ASSISTANT(S):  OWEN Suárez's assistance was required as there were no qualified residents available.     ANESTHESIA:  Local (1:1 mixture 0.5% bupivacaine and 1% lidocaine)     IMPLANTS:   None    ESTIMATED BLOOD LOSS:  1cc    DVT PROPHYLAXIS:  None    TOURNIQUET TIME:  12 minutes     SPECIMENS REMOVED:  Right middle finger mass    INTRAOPERATIVE FINDINGS:  Fibrous mass dorsal radial aspect of the distal interphalangeal joint right middle finger    COMPLICATIONS:  None    DISPOSITION:  Stable to PACU.     INDICATIONS:  The patient is a 48-year-old male who developed a mass over the dorsal radial aspect of the right middle finger distal interphalangeal joint.  Arose approximately 7 months ago.  It has become increasingly symptomatic.  He had tried popping it at home with expression of clear gelatinous fluid.  He wishes to have it excised.    Indications for surgery were discussed with the patient in detail. After discussing risks, benefits and alternatives to procedure, the patient elected to proceed with surgical intervention.     The patient understood that risks include, but are not limited to: Bleeding, infection, damage to surrounding structures (such as nerve, vessel or tendon), recurrence, need for additional procedures, pain, stiffness, need for therapy, and anesthetic complications. The patient expressed understanding and agreement and wished to proceed.     Consent was obtained for the procedure.     DESCRIPTION OF PROCEDURE IN DETAIL:  The patient was seen in the preoperative care unit. Patient identity, consent, procedure to be performed,  and operative site were verified with the patient. The right upper extremity was marked.  The skin at the base of the finger was cleansed with alcohol and 10 cc of the above local anesthetic mixture was infiltrated at the base of the finger for digital block.     The patient was brought to the operating room and placed supine on the operating room table. The right upper extremity was placed on an armboard.      The right upper extremity was prepped and draped in the usual sterile fashion. A timeout was performed confirming the correct patient, procedure, and operative site. All were in agreement.     A finger tourniquet was applied to the base of the finger.  A transverse incision was made over the distal interphalangeal joint crease and continued in longitudinal fashion along the radial mid-axis of the distal phalanx.  An L-shaped skin flap was carefully elevated off of the underlying mass using a [place] blade.  The mass was found to be densely adherent to the overlying skin.  It was carefully dissected free of all surrounding tissues and elevated off of the extensor tendon.  It appeared to emanate from the extensor tendon and capsule. It did not appear to come from the joint itself.  It was excised in full and sent for pathology.  The base of the cyst was then cauterized with bipolar.  No arthritis or dorsal osteophyte was appreciated on radiographs.  Therefore a capsulotomy was not indicated.  The wound was copiously irrigated.  The finger tourniquet was removed.  Hemostasis was assured.  The skin was then closed with interrupted horizontal mattress 4-0 nylon sutures.  Bacitracin, Adaptic, and a sterile gauze and Coban dressing were applied.    All needle and sponge counts were correct at the end of the procedure. There were no complications.     The patient was taken to the postoperative recovery unit in stable condition.     I was present and scrubbed for the entire procedure.     POSTOPERATIVE PLAN:  The  patient will be discharged home.  He will be nonweightbearing.  I instructed the patient to keep the dressing on for minimum of a week.  The incision should be kept dry for a week.  If the dressing does fall off and the wound is dry, he may gently wash his finger with soap and water and reapply bandage or Band-Aid.  He will return in 10 to 14 days for suture removal.    Deep Valdivia MD     Hand, Upper Extremity & Microvascular Surgery  Department of Orthopedic Surgery  [facility]

## 2023-11-17 NOTE — DISCHARGE INSTRUCTIONS
Procedure Performed: Right middle finger digital mucous cyst excision  Attending Surgeon: Deep Valdivia MD  Date: 11/17/2023    DIAGNOSIS  1. Myxoid cyst        MEDICATIONS   Resume all home medications as directed unless otherwise instructed during this hospitalization. If there is any question, double check with your primary care provider.  Start new discharge medications as directed.    Take 1 tablet of 650 mg Tylenol (acetaminophen) Arthritis Strength (extended release) and 1 tablet of Aleve (naproxen) 220 mg in the morning with breakfast and in the evening with dinner.     For breakthrough pain use narcotic pain medication as prescribed.    Do not drive or operate machinery while taking narcotic pain medications.   If you are taking other Tylenol containing medicines at home, be sure NOT to exceed 4 gram's (4000 milligrams) of Tylenol per day.   If you are taking pain medications, be sure to take Colace (docusate sodium) as well to prevent constipation. If constipated, try adding another cathartic or enema.  If nausea and vomiting, call the hospital or seek medical attention.    ACTIVITY   Weight bearing: Non-weight bearing to arm.    DIET  Resume same diet prior to your hospital admission.    WOUND   Leave dressing on until you are seen in clinic for your follow up visit.   Watch for signs and symptoms of infection of your wounds including; pain, redness, swelling, drainage or fever.  If you notice any of these symptoms please call or seek medical attention.    Keep wound clean, dry, and intact.  Do not submerge wounds in water until they are healed. No baths, soaking, swimming, or prolonged water exposure for 4 weeks after surgery.    RETURN   Follow-up with Orthopedic Clinic as directed.     Future Appointments   Date Time Provider Department Center   11/28/2023  8:30 AM Deep Valdivia MD [facility]   12/5/2023  9:30 AM Robe Gaston PA-C [facility]   7/25/2024 11:30 AM Jeovanny Cordero,  OWEN DEJESUS Salyer       Call the [facility] at [phone] during business hours for any symptoms such as:    * Fevers with Temperature greater than 101.5 degrees.   * Pus drainage from wound site.   * Severe pain, not controlled by medication.   * Persistent nausea, vomiting and inablility to tolerate fluids.    If you are receiving care in [place], you may call the Orthopedic clinic at [phone].    FOR URGENT PROBLEMS ONLY, after hours or on weekends call the hospital  at [phone] and ask to speak with the orthopedic resident on call.    [facility]  Home Care Following Your Procedure  Call a doctor if you have signs of infection (fever, growing tenderness at the surgery site, a large amount of drainage or bleeding, severe pain, foul-smelling drainage, redness, swelling).             Tylenol/Acetaminophen Consumption    If you feel your pain relief is insufficient, you may take Tylenol/Acetaminophen in addition to your narcotic pain medication.   Be careful not to exceed 4,000 mg of Tylenol/Acetaminophen in a 24 hour period from all sources.  If you are taking extra strength Tylenol/acetaminophen (500 mg), the maximum dose is 8 tablets in 24 hours.  If you are taking regular strength acetaminophen (325 mg), the maximum dose is 12 tablets in 24 hours.      Your doctor is:       Dr. Deep Valdivia, Orthopaedics: [phone]             Or dial [phone] and ask for the resident on call for:  Orthopaedics  For emergency care, call the:  [facility]: [phone] (TTY for hearing impaired: [phone])

## 2023-11-21 LAB
PATH REPORT.COMMENTS IMP SPEC: NORMAL
PATH REPORT.COMMENTS IMP SPEC: NORMAL
PATH REPORT.FINAL DX SPEC: NORMAL
PATH REPORT.GROSS SPEC: NORMAL
PATH REPORT.MICROSCOPIC SPEC OTHER STN: NORMAL
PATH REPORT.RELEVANT HX SPEC: NORMAL
PHOTO IMAGE: NORMAL

## 2023-11-27 NOTE — PROGRESS NOTES
Orthopaedic Surgery Hand Clinic Progress Note    Patient Name: Jonny Quinones  MRN: [id number]  : 1975  Date: 2023    Date of Surgery: 23    Surgery Performed: Right middle finger mucous cyst excision     Subjective:  Mr. Jonny Quinones is a 48 year old male who returns 11 days s/p above procedure. Since that time patient reports minor soreness in the finger with some tinging along the radial aspect of the finger, but overall is doing well.     Objective:  There were no vitals taken for this visit.    General: alert, appropriate, NAD  HEENT: NC/AT  CV: RRR by pulse  Pulm: Unlabored on RA  MSK:  Incision is clean, dry, intact  Central thin area of skin with scabbing where the cyst was most prominent  Nylon sutures in place  Intact FDP, FDS, EDC  Sensation intact to light touch median, radial, ulnar nerve distributions  Well-perfused, capillary fill less than 2 seconds    Assessment/Plan:  48-year-old male status post right middle finger mucous cyst excision 2023.    Sutures removed today.  I advised the patient to begin washing his hands with gentle soap and water.  Avoid soaking, prolonged water exposure x 4 weeks postop    He should apply bacitracin, Neosporin, or petroleum based ointment to the wound and cover with a Band-Aid.  This will help with reepithelialization.    Okay to use hand for gentle activities of daily living.  Avoid heavy lifting, twisting, gripping.  Patient may return to full activity once wound is fully healed.    All questions answered.  The patient voiced understanding and agreement.  Return precautions discussed.  Follow-up with me as needed.    Deep Valdivia MD    Hand & Upper Extremity Surgery  Department of Orthopedic Surgery  [facility]

## 2023-11-28 ENCOUNTER — OFFICE VISIT (OUTPATIENT)
Dept: ORTHOPEDICS | Facility: CLINIC | Age: 48
End: 2023-11-28
Payer: COMMERCIAL

## 2023-11-28 VITALS
SYSTOLIC BLOOD PRESSURE: 138 MMHG | DIASTOLIC BLOOD PRESSURE: 95 MMHG | HEIGHT: 74 IN | BODY MASS INDEX: 40.43 KG/M2 | WEIGHT: 315 LBS

## 2023-11-28 DIAGNOSIS — M71.30 MYXOID CYST: Primary | ICD-10-CM

## 2023-11-28 PROCEDURE — 99024 POSTOP FOLLOW-UP VISIT: CPT | Performed by: STUDENT IN AN ORGANIZED HEALTH CARE EDUCATION/TRAINING PROGRAM

## 2023-12-02 ASSESSMENT — ENCOUNTER SYMPTOMS
SORE THROAT: 0
DIARRHEA: 0
FEVER: 0
CONSTIPATION: 0
MYALGIAS: 0
FREQUENCY: 0
WEAKNESS: 0
JOINT SWELLING: 0
SHORTNESS OF BREATH: 0
PALPITATIONS: 0
ABDOMINAL PAIN: 0
NERVOUS/ANXIOUS: 0
PARESTHESIAS: 0
HEMATOCHEZIA: 0
HEARTBURN: 0
HEMATURIA: 0
ARTHRALGIAS: 0
DYSURIA: 0
COUGH: 0
NAUSEA: 0
HEADACHES: 0
DIZZINESS: 0
CHILLS: 0
EYE PAIN: 0

## 2023-12-03 DIAGNOSIS — I10 ESSENTIAL HYPERTENSION: ICD-10-CM

## 2023-12-04 RX ORDER — HYDROCHLOROTHIAZIDE 25 MG/1
25 TABLET ORAL DAILY
Qty: 90 TABLET | Refills: 0 | Status: SHIPPED | OUTPATIENT
Start: 2023-12-04 | End: 2024-02-18

## 2023-12-05 ENCOUNTER — OFFICE VISIT (OUTPATIENT)
Dept: FAMILY MEDICINE | Facility: CLINIC | Age: 48
End: 2023-12-05
Attending: PHYSICIAN ASSISTANT
Payer: COMMERCIAL

## 2023-12-05 VITALS
RESPIRATION RATE: 20 BRPM | DIASTOLIC BLOOD PRESSURE: 100 MMHG | WEIGHT: 315 LBS | HEART RATE: 64 BPM | OXYGEN SATURATION: 97 % | HEIGHT: 74 IN | SYSTOLIC BLOOD PRESSURE: 150 MMHG | BODY MASS INDEX: 40.43 KG/M2 | TEMPERATURE: 97.9 F

## 2023-12-05 DIAGNOSIS — G47.33 OSA (OBSTRUCTIVE SLEEP APNEA): ICD-10-CM

## 2023-12-05 DIAGNOSIS — E66.01 MORBID OBESITY (H): ICD-10-CM

## 2023-12-05 DIAGNOSIS — Z00.00 ROUTINE GENERAL MEDICAL EXAMINATION AT A HEALTH CARE FACILITY: Primary | ICD-10-CM

## 2023-12-05 DIAGNOSIS — M1A.9XX0 CHRONIC GOUT INVOLVING TOE OF LEFT FOOT WITHOUT TOPHUS, UNSPECIFIED CAUSE: ICD-10-CM

## 2023-12-05 DIAGNOSIS — G89.29 CHRONIC LOW BACK PAIN WITHOUT SCIATICA, UNSPECIFIED BACK PAIN LATERALITY: ICD-10-CM

## 2023-12-05 DIAGNOSIS — M54.50 CHRONIC LOW BACK PAIN WITHOUT SCIATICA, UNSPECIFIED BACK PAIN LATERALITY: ICD-10-CM

## 2023-12-05 DIAGNOSIS — I10 ESSENTIAL HYPERTENSION: ICD-10-CM

## 2023-12-05 DIAGNOSIS — R73.03 PREDIABETES: ICD-10-CM

## 2023-12-05 LAB
ALBUMIN SERPL BCG-MCNC: 4.1 G/DL (ref 3.5–5.2)
ALP SERPL-CCNC: 68 U/L (ref 40–150)
ALT SERPL W P-5'-P-CCNC: 42 U/L (ref 0–70)
ANION GAP SERPL CALCULATED.3IONS-SCNC: 10 MMOL/L (ref 7–15)
AST SERPL W P-5'-P-CCNC: 34 U/L (ref 0–45)
BILIRUB SERPL-MCNC: 0.5 MG/DL
BUN SERPL-MCNC: 13.5 MG/DL (ref 6–20)
CALCIUM SERPL-MCNC: 9.4 MG/DL (ref 8.6–10)
CHLORIDE SERPL-SCNC: 103 MMOL/L (ref 98–107)
CHOLEST SERPL-MCNC: 212 MG/DL
CREAT SERPL-MCNC: 0.92 MG/DL (ref 0.67–1.17)
DEPRECATED HCO3 PLAS-SCNC: 27 MMOL/L (ref 22–29)
EGFRCR SERPLBLD CKD-EPI 2021: >90 ML/MIN/1.73M2
GLUCOSE SERPL-MCNC: 93 MG/DL (ref 70–99)
HBA1C MFR BLD: 5.5 % (ref 0–5.6)
HDLC SERPL-MCNC: 37 MG/DL
LDLC SERPL CALC-MCNC: 124 MG/DL
NONHDLC SERPL-MCNC: 175 MG/DL
POTASSIUM SERPL-SCNC: 3.7 MMOL/L (ref 3.4–5.3)
PROT SERPL-MCNC: 7.5 G/DL (ref 6.4–8.3)
SODIUM SERPL-SCNC: 140 MMOL/L (ref 135–145)
TRIGL SERPL-MCNC: 254 MG/DL
URATE SERPL-MCNC: 7.4 MG/DL (ref 3.4–7)

## 2023-12-05 PROCEDURE — 99396 PREV VISIT EST AGE 40-64: CPT | Mod: 25 | Performed by: PHYSICIAN ASSISTANT

## 2023-12-05 PROCEDURE — 90471 IMMUNIZATION ADMIN: CPT | Performed by: PHYSICIAN ASSISTANT

## 2023-12-05 PROCEDURE — 84550 ASSAY OF BLOOD/URIC ACID: CPT | Performed by: PHYSICIAN ASSISTANT

## 2023-12-05 PROCEDURE — 83036 HEMOGLOBIN GLYCOSYLATED A1C: CPT | Performed by: PHYSICIAN ASSISTANT

## 2023-12-05 PROCEDURE — 82088 ASSAY OF ALDOSTERONE: CPT | Performed by: PHYSICIAN ASSISTANT

## 2023-12-05 PROCEDURE — 99000 SPECIMEN HANDLING OFFICE-LAB: CPT | Performed by: PHYSICIAN ASSISTANT

## 2023-12-05 PROCEDURE — 36415 COLL VENOUS BLD VENIPUNCTURE: CPT | Performed by: PHYSICIAN ASSISTANT

## 2023-12-05 PROCEDURE — 90686 IIV4 VACC NO PRSV 0.5 ML IM: CPT | Performed by: PHYSICIAN ASSISTANT

## 2023-12-05 PROCEDURE — 90715 TDAP VACCINE 7 YRS/> IM: CPT | Performed by: PHYSICIAN ASSISTANT

## 2023-12-05 PROCEDURE — 99214 OFFICE O/P EST MOD 30 MIN: CPT | Mod: 24 | Performed by: PHYSICIAN ASSISTANT

## 2023-12-05 PROCEDURE — 84244 ASSAY OF RENIN: CPT | Mod: 90 | Performed by: PHYSICIAN ASSISTANT

## 2023-12-05 PROCEDURE — 90472 IMMUNIZATION ADMIN EACH ADD: CPT | Performed by: PHYSICIAN ASSISTANT

## 2023-12-05 PROCEDURE — 80053 COMPREHEN METABOLIC PANEL: CPT | Performed by: PHYSICIAN ASSISTANT

## 2023-12-05 PROCEDURE — 80061 LIPID PANEL: CPT | Performed by: PHYSICIAN ASSISTANT

## 2023-12-05 ASSESSMENT — ENCOUNTER SYMPTOMS
DIZZINESS: 0
FEVER: 0
MYALGIAS: 0
NERVOUS/ANXIOUS: 0
FREQUENCY: 0
HEMATURIA: 0
HEADACHES: 0
PALPITATIONS: 0
HEMATOCHEZIA: 0
SORE THROAT: 0
HEARTBURN: 0
DYSURIA: 0
DIARRHEA: 0
COUGH: 0
ARTHRALGIAS: 0
ABDOMINAL PAIN: 0
CHILLS: 0
NAUSEA: 0
CONSTIPATION: 0
WEAKNESS: 0
PARESTHESIAS: 0
JOINT SWELLING: 0
EYE PAIN: 0
SHORTNESS OF BREATH: 0

## 2023-12-05 ASSESSMENT — ANXIETY QUESTIONNAIRES
7. FEELING AFRAID AS IF SOMETHING AWFUL MIGHT HAPPEN: NOT AT ALL
IF YOU CHECKED OFF ANY PROBLEMS ON THIS QUESTIONNAIRE, HOW DIFFICULT HAVE THESE PROBLEMS MADE IT FOR YOU TO DO YOUR WORK, TAKE CARE OF THINGS AT HOME, OR GET ALONG WITH OTHER PEOPLE: SOMEWHAT DIFFICULT
2. NOT BEING ABLE TO STOP OR CONTROL WORRYING: NOT AT ALL
1. FEELING NERVOUS, ANXIOUS, OR ON EDGE: NOT AT ALL
GAD7 TOTAL SCORE: 3
GAD7 TOTAL SCORE: 3
6. BECOMING EASILY ANNOYED OR IRRITABLE: SEVERAL DAYS
5. BEING SO RESTLESS THAT IT IS HARD TO SIT STILL: SEVERAL DAYS
3. WORRYING TOO MUCH ABOUT DIFFERENT THINGS: NOT AT ALL
4. TROUBLE RELAXING: SEVERAL DAYS

## 2023-12-05 ASSESSMENT — PAIN SCALES - GENERAL: PAINLEVEL: MODERATE PAIN (4)

## 2023-12-05 NOTE — PATIENT INSTRUCTIONS
GET YOUR SHINGLES SHOT at pharmacy!!      Preventive Health Recommendations  Male Ages 50 - 64    Yearly exam:             See your health care provider every year in order to  o   Review health changes.   o   Discuss preventive care.    o   Review your medicines if your doctor has prescribed any.   Have a cholesterol test every 5 years, or more frequently if you are at risk for high cholesterol/heart disease.   Have a diabetes test (fasting glucose) every three years. If you are at risk for diabetes, you should have this test more often.   Have a colonoscopy at age 45, or have a yearly FIT test (stool test). These exams will check for colon cancer.    Talk with your health care provider about whether or not a prostate cancer screening test (PSA) is right for you.  You should be tested each year for STDs (sexually transmitted diseases), if you re at risk.     Shots: Get a flu shot each year. Get a tetanus shot every 10 years.     Nutrition:  Eat at least 5 servings of fruits and vegetables daily.   Eat whole-grain bread, whole-wheat pasta and brown rice instead of white grains and rice.   Get adequate Calcium and Vitamin D.     Lifestyle  Exercise for at least 150 minutes a week (30 minutes a day, 5 days a week). This will help you control your weight and prevent disease.   Limit alcohol to one drink per day.   No smoking.   Wear sunscreen to prevent skin cancer.   See your dentist every six months for an exam and cleaning.   See your eye doctor every 1 to 2 years.

## 2023-12-05 NOTE — PROGRESS NOTES
"SUBJECTIVE:   Jonny is a 48 year old, presenting for the following:  Physical        12/5/2023     9:24 AM   Additional Questions   Roomed by Ankur CHAPMAN   Accompanied by Wife ( Nayana)         12/5/2023     9:24 AM   Patient Reported Additional Medications   Patient reports taking the following new medications none       Healthy Habits:     Getting at least 3 servings of Calcium per day:  Yes    Bi-annual eye exam:  Yes    Dental care twice a year:  Yes    Sleep apnea or symptoms of sleep apnea:  Sleep apnea    Diet:  Low salt    Frequency of exercise:  1 day/week    Duration of exercise:  30-45 minutes    Taking medications regularly:  Yes    Medication side effects:  Not applicable    Additional concerns today:  No    Jonny Quinones is a 48 year old male who presents today for annual check up  Overall doing well - did have myxoid cyst removed recently and well well  Has noted progressively worsening back symptoms   Weight gain; diet not as good lately      Social History     Tobacco Use    Smoking status: Never     Passive exposure: Never    Smokeless tobacco: Never   Substance Use Topics    Alcohol use: No             12/2/2023    11:02 AM   Alcohol Use   Prescreen: >3 drinks/day or >7 drinks/week? Not Applicable       Last PSA: No results found for: \"PSA\"    Reviewed orders with patient. Reviewed health maintenance and updated orders accordingly - Yes  Lab work is in process  Labs reviewed in EPIC    Reviewed and updated as needed this visit by clinical staff   Tobacco  Allergies  Meds              Reviewed and updated as needed this visit by Provider                   Review of Systems   Constitutional:  Negative for chills and fever.   HENT:  Negative for congestion, ear pain, hearing loss and sore throat.    Eyes:  Negative for pain and visual disturbance.   Respiratory:  Negative for cough and shortness of breath.    Cardiovascular:  Negative for chest pain, palpitations and peripheral edema. " "  Gastrointestinal:  Negative for abdominal pain, constipation, diarrhea, heartburn, hematochezia and nausea.   Genitourinary:  Negative for dysuria, frequency, genital sores, hematuria, impotence, penile discharge and urgency.   Musculoskeletal:  Negative for arthralgias, joint swelling and myalgias.   Skin:  Negative for rash.   Neurological:  Negative for dizziness, weakness, headaches and paresthesias.   Psychiatric/Behavioral:  Negative for mood changes. The patient is not nervous/anxious.        OBJECTIVE:   BP (!) 157/102 (BP Location: Right arm, Patient Position: Sitting, Cuff Size: Adult Large)   Pulse 64   Temp 97.9  F (36.6  C) (Oral)   Resp 20   Ht 1.88 m (6' 2\")   Wt (!) 167.3 kg (368 lb 12.8 oz)   SpO2 97%   BMI 47.35 kg/m      Physical Exam  GENERAL: healthy, alert and no distress  EYES: Eyes grossly normal to inspection, PERRL and conjunctivae and sclerae normal  HENT: ear canals and TM's normal, nose and mouth without ulcers or lesions  RESP: lungs clear to auscultation - no rales, rhonchi or wheezes  CV: regular rate and rhythm, normal S1 S2, no S3 or S4, no murmur, click or rub, no peripheral edema and peripheral pulses strong  ABDOMEN: morbidly obese, soft, nontender, without hepatosplenomegaly or masses and bowel sounds normal  MS: No peripheral edema;  BACK: no CVA tenderness, no paralumbar tenderness  PSYCH: mentation appears normal, affect normal/bright    Diagnostic Test Results:  Labs reviewed in Epic  Updating today    ASSESSMENT/PLAN:   1. Routine general medical examination at a health care facility  Reviewed personal and family history. Reviewed age appropriate screenings. Recommended any needed vaccinations.  - INFLUENZA VACCINE >6 MONTHS (AFLURIA/FLUZONE)    2. Chronic low back pain without sciatica, unspecified back pain laterality  He is planning to follow up with [facility]    3. Essential hypertension  Uncontrolled. Screening below but may add MAC regardless.   - Aldosterone; " "Future  - Renin activity; Future  - Aldosterone Renin Ratio; Future  - Comprehensive metabolic panel (BMP + Alb, Alk Phos, ALT, AST, Total. Bili, TP); Future  - Aldosterone Renin Ratio  - Comprehensive metabolic panel (BMP + Alb, Alk Phos, ALT, AST, Total. Bili, TP)    4. Morbid obesity (H)  Contributing to a lot of issues including back pain, HTN, TREMAINE, etc. Discussed medications today be he is in favor with first starting meal/dietary changes  - Lipid panel reflex to direct LDL Non-fasting; Future  - Comprehensive metabolic panel (BMP + Alb, Alk Phos, ALT, AST, Total. Bili, TP); Future  - Lipid panel reflex to direct LDL Non-fasting  - Comprehensive metabolic panel (BMP + Alb, Alk Phos, ALT, AST, Total. Bili, TP)    5. Prediabetes  Updating labs. Diet/exercise best  - Hemoglobin A1c; Future  - Hemoglobin A1c    6. TREMAINE (obstructive sleep apnea)  Recommend he get updated settings  - Adult Sleep Eval & Management  Referral; Future    7. Chronic gout involving toe of left foot without tophus, unspecified cause  Trending the uric acid  - Uric acid; Future  - Uric acid        COUNSELING:   Reviewed preventive health counseling, as reflected in patient instructions      BMI:   Estimated body mass index is 47.35 kg/m  as calculated from the following:    Height as of this encounter: 1.88 m (6' 2\").    Weight as of this encounter: 167.3 kg (368 lb 12.8 oz).         He reports that he has never smoked. He has never been exposed to tobacco smoke. He has never used smokeless tobacco.            Robe Gaston PA-C  [facility] [place]  Answers submitted by the patient for this visit:  JULIUS-7 (Submitted on 12/5/2023)  JULIUS 7 TOTAL SCORE: 3    "

## 2023-12-05 NOTE — PROGRESS NOTES
Prior to immunization administration, verified patients identity using patient s name and date of birth. Please see Immunization Activity for additional information.     Screening Questionnaire for Adult Immunization    Are you sick today?   No   Do you have allergies to medications, food, a vaccine component or latex?   No   Have you ever had a serious reaction after receiving a vaccination?   No   Do you have a long-term health problem with heart, lung, kidney, or metabolic disease (e.g., diabetes), asthma, a blood disorder, no spleen, complement component deficiency, a cochlear implant, or a spinal fluid leak?  Are you on long-term aspirin therapy?   No   Do you have cancer, leukemia, HIV/AIDS, or any other immune system problem?   No   Do you have a parent, brother, or sister with an immune system problem?   No   In the past 3 months, have you taken medications that affect  your immune system, such as prednisone, other steroids, or anticancer drugs; drugs for the treatment of rheumatoid arthritis, Crohn s disease, or psoriasis; or have you had radiation treatments?   No   Have you had a seizure, or a brain or other nervous system problem?   No   During the past year, have you received a transfusion of blood or blood    products, or been given immune (gamma) globulin or antiviral drug?   No   For women: Are you pregnant or is there a chance you could become       pregnant during the next month?   No   Have you received any vaccinations in the past 4 weeks?   No     Immunization questionnaire answers were all negative.      Patient instructed to remain in clinic for 15 minutes afterwards, and to report any adverse reactions.     Screening performed by Ankur Marc MA on 12/5/2023 at 10:22 AM.

## 2023-12-08 LAB — ALDOST SERPL-MCNC: 21 NG/DL (ref 0–31)

## 2023-12-09 LAB — RENIN PLAS-CCNC: 2.2 NG/ML/HR

## 2023-12-11 LAB — ALDOST/RENIN PLAS-RTO: 9.5 {RATIO} (ref 0–25)

## 2023-12-30 DIAGNOSIS — I10 ESSENTIAL HYPERTENSION: ICD-10-CM

## 2024-01-02 RX ORDER — AMLODIPINE BESYLATE 10 MG/1
10 TABLET ORAL DAILY
Qty: 90 TABLET | Refills: 1 | Status: SHIPPED | OUTPATIENT
Start: 2024-01-02 | End: 2024-06-28

## 2024-01-02 RX ORDER — OLMESARTAN MEDOXOMIL 40 MG/1
40 TABLET ORAL DAILY
Qty: 90 TABLET | Refills: 1 | Status: SHIPPED | OUTPATIENT
Start: 2024-01-02 | End: 2024-06-28

## 2024-01-05 NOTE — LETTER
2023         RE: Jonny Quinones  [address] [address]        Dear Colleague,    Thank you for referring your patient, Jonny Quinones, to the [facility] [place]. Please see a copy of my visit note below.    Orthopaedic Surgery Hand Clinic Progress Note    Patient Name: Jonny Quinones  MRN: [id number]  : 1975  Date: 2023    Date of Surgery: 23    Surgery Performed: Right middle finger mucous cyst excision     Subjective:  Mr. Jonny Quinones is a 48 year old male who returns 11 days s/p above procedure. Since that time patient reports minor soreness in the finger with some tinging along the radial aspect of the finger, but overall is doing well.     Objective:  There were no vitals taken for this visit.    General: alert, appropriate, NAD  HEENT: NC/AT  CV: RRR by pulse  Pulm: Unlabored on RA  MSK:  Incision is clean, dry, intact  Central thin area of skin with scabbing where the cyst was most prominent  Nylon sutures in place  Intact FDP, FDS, EDC  Sensation intact to light touch median, radial, ulnar nerve distributions  Well-perfused, capillary fill less than 2 seconds    Assessment/Plan:  48-year-old male status post right middle finger mucous cyst excision 2023.    Sutures removed today.  I advised the patient to begin washing his hands with gentle soap and water.  Avoid soaking, prolonged water exposure x 4 weeks postop    He should apply bacitracin, Neosporin, or petroleum based ointment to the wound and cover with a Band-Aid.  This will help with reepithelialization.    Okay to use hand for gentle activities of daily living.  Avoid heavy lifting, twisting, gripping.  Patient may return to full activity once wound is fully healed.    All questions answered.  The patient voiced understanding and agreement.  Return precautions discussed.  Follow-up with me as needed.    Deep Valdivia MD    Hand &  Upper Extremity Surgery  Department of Orthopedic Surgery  [facility]      Again, thank you for allowing me to participate in the care of your patient.        Sincerely,        Deep Valdivia MD   [id number]: previous_biopsy_has_been_previously_biopsied

## 2024-01-17 ENCOUNTER — MYC REFILL (OUTPATIENT)
Dept: FAMILY MEDICINE | Facility: CLINIC | Age: 49
End: 2024-01-17
Payer: COMMERCIAL

## 2024-01-17 DIAGNOSIS — E87.6 HYPOKALEMIA: ICD-10-CM

## 2024-01-17 DIAGNOSIS — I10 ESSENTIAL HYPERTENSION: ICD-10-CM

## 2024-01-18 RX ORDER — METOPROLOL SUCCINATE 50 MG/1
50 TABLET, EXTENDED RELEASE ORAL DAILY
Qty: 90 TABLET | Refills: 1 | Status: SHIPPED | OUTPATIENT
Start: 2024-01-18 | End: 2024-08-08

## 2024-01-18 RX ORDER — METOPROLOL SUCCINATE 100 MG/1
100 TABLET, EXTENDED RELEASE ORAL DAILY
Qty: 90 TABLET | Refills: 1 | Status: SHIPPED | OUTPATIENT
Start: 2024-01-18 | End: 2024-07-19

## 2024-01-18 RX ORDER — POTASSIUM CHLORIDE 1500 MG/1
20 TABLET, EXTENDED RELEASE ORAL DAILY
Qty: 90 TABLET | Refills: 3 | Status: SHIPPED | OUTPATIENT
Start: 2024-01-18 | End: 2024-08-27

## 2024-02-18 ENCOUNTER — MYC REFILL (OUTPATIENT)
Dept: FAMILY MEDICINE | Facility: CLINIC | Age: 49
End: 2024-02-18
Payer: COMMERCIAL

## 2024-02-18 DIAGNOSIS — I10 ESSENTIAL HYPERTENSION: ICD-10-CM

## 2024-02-18 DIAGNOSIS — F41.1 ANXIETY STATE: ICD-10-CM

## 2024-02-19 RX ORDER — HYDROCHLOROTHIAZIDE 25 MG/1
25 TABLET ORAL DAILY
Qty: 90 TABLET | Refills: 1 | Status: SHIPPED | OUTPATIENT
Start: 2024-02-19 | End: 2024-08-27

## 2024-02-19 RX ORDER — CITALOPRAM HYDROBROMIDE 20 MG/1
20 TABLET ORAL DAILY
Qty: 90 TABLET | Refills: 0 | Status: SHIPPED | OUTPATIENT
Start: 2024-02-19 | End: 2024-06-11

## 2024-03-28 ENCOUNTER — MYC REFILL (OUTPATIENT)
Dept: FAMILY MEDICINE | Facility: CLINIC | Age: 49
End: 2024-03-28
Payer: COMMERCIAL

## 2024-03-28 DIAGNOSIS — I10 ESSENTIAL HYPERTENSION: ICD-10-CM

## 2024-03-29 RX ORDER — AMLODIPINE BESYLATE 10 MG/1
10 TABLET ORAL DAILY
Qty: 90 TABLET | Refills: 1 | OUTPATIENT
Start: 2024-03-29

## 2024-03-29 RX ORDER — OLMESARTAN MEDOXOMIL 40 MG/1
40 TABLET ORAL DAILY
Qty: 90 TABLET | Refills: 1 | OUTPATIENT
Start: 2024-03-29

## 2024-05-17 ENCOUNTER — VIRTUAL VISIT (OUTPATIENT)
Dept: URGENT CARE | Facility: CLINIC | Age: 49
End: 2024-05-17
Payer: COMMERCIAL

## 2024-05-17 ENCOUNTER — MYC MEDICAL ADVICE (OUTPATIENT)
Dept: FAMILY MEDICINE | Facility: CLINIC | Age: 49
End: 2024-05-17
Payer: COMMERCIAL

## 2024-05-17 DIAGNOSIS — M10.071 ACUTE IDIOPATHIC GOUT OF RIGHT FOOT: Primary | ICD-10-CM

## 2024-05-17 PROCEDURE — 99213 OFFICE O/P EST LOW 20 MIN: CPT | Mod: 95

## 2024-05-17 RX ORDER — PREDNISONE 20 MG/1
40 TABLET ORAL DAILY
Qty: 10 TABLET | Refills: 0 | Status: SHIPPED | OUTPATIENT
Start: 2024-05-17 | End: 2024-05-22

## 2024-05-17 NOTE — PROGRESS NOTES
Jonny is a 49 year old who is being evaluated via a billable video visit.    How would you like to obtain your AVS? MyChart  If the video visit is dropped, the invitation should be resent by: Text to cell phone: [phone]  Will anyone else be joining your video visit? No      Assessment & Plan     (M10.071) Acute idiopathic gout of right foot  (primary encounter diagnosis)    Plan: predniSONE (DELTASONE) 20 MG tablet  DEYSI Marrero CNP      Subjective   Jonny is a 49 year old, presenting for the following health issues:    HPI     GOUT    Pain right side of right foot X 2 weeks worsening  No injury   Thought maybe from shoes  Has hx gout   Taking ibuprofen      Objective           Vitals:  No vitals were obtained today due to virtual visit.    Physical Exam   GENERAL: alert and no distress  PSYCH: Appropriate affect, tone, and pace of words  FOOT: right foot outside pain redness swelling      Video-Visit Details  Time started: 6:30 pm  Time ended: 6:45 pm  Type of service:  Video Visit   Originating Location (pt. Location): Home    Distant Location (provider location):  Off-site  Platform used for Video Visit: Anthony  Signed Electronically by: [facility]

## 2024-05-17 NOTE — TELEPHONE ENCOUNTER
Yes please have them submit gout attack with details of when started, recent diet, pain location, etc

## 2024-06-09 DIAGNOSIS — F41.1 ANXIETY STATE: ICD-10-CM

## 2024-06-11 RX ORDER — CITALOPRAM HYDROBROMIDE 20 MG/1
20 TABLET ORAL DAILY
Qty: 90 TABLET | Refills: 0 | Status: SHIPPED | OUTPATIENT
Start: 2024-06-11 | End: 2024-08-27

## 2024-06-28 DIAGNOSIS — I10 ESSENTIAL HYPERTENSION: ICD-10-CM

## 2024-06-28 RX ORDER — AMLODIPINE BESYLATE 10 MG/1
10 TABLET ORAL DAILY
Qty: 30 TABLET | Refills: 0 | Status: SHIPPED | OUTPATIENT
Start: 2024-06-28 | End: 2024-07-31

## 2024-06-28 RX ORDER — OLMESARTAN MEDOXOMIL 40 MG/1
40 TABLET ORAL DAILY
Qty: 30 TABLET | Refills: 0 | Status: SHIPPED | OUTPATIENT
Start: 2024-06-28 | End: 2024-07-31

## 2024-06-28 NOTE — TELEPHONE ENCOUNTER
FRANCISCAM requesting a call back for an appt. Two more attempts will be made.    Henna Paul  Lead   [facility]

## 2024-06-28 NOTE — TELEPHONE ENCOUNTER
Sent [employer] message requesting a call back for an appt. One more attempt will be made.     Henna Paul  Lead   [facility]

## 2024-06-28 NOTE — LETTER
June 28, 2024      Jonny Quinones  [address] [address]        Jazmine Fuller,     We recently received a call from your pharmacy requesting a refill request for your medication. We left a message at [phone] to notify you that you are due for an OFFICE VISIT for further refills.      We have authorized a one time refill of your medication to allow time for you to schedule your appointment.      Please call [phone] to schedule an appointment or if you have MyChart you can schedule with your provider as well.      Taking care of your health is important to us, and ongoing visits with your provider are vital to your care. We look forward to seeing you in the near future.      Thank you for using [facility] for your medical needs.      Sincerely,        Robe Gaston PA-C

## 2024-07-12 DIAGNOSIS — Z83.2 FAMILY HISTORY OF FACTOR V LEIDEN MUTATION: ICD-10-CM

## 2024-07-12 DIAGNOSIS — D68.51 HOMOZYGOUS FACTOR V LEIDEN MUTATION (H): ICD-10-CM

## 2024-07-12 DIAGNOSIS — I82.441 ACUTE DEEP VEIN THROMBOSIS (DVT) OF TIBIAL VEIN OF RIGHT LOWER EXTREMITY (H): ICD-10-CM

## 2024-07-12 DIAGNOSIS — Z82.49 FAMILY HISTORY OF DVT: ICD-10-CM

## 2024-07-12 RX ORDER — RIVAROXABAN 20 MG/1
20 TABLET, FILM COATED ORAL
Qty: 90 TABLET | Refills: 0 | Status: SHIPPED | OUTPATIENT
Start: 2024-07-12 | End: 2024-09-30

## 2024-07-12 NOTE — TELEPHONE ENCOUNTER
Pt scheduled for annual follow up 7/25/24.    Risa Galindo  BSN, RN, PHN   [facility]   Office: [phone]  Fax: [phone]

## 2024-07-19 DIAGNOSIS — I10 ESSENTIAL HYPERTENSION: ICD-10-CM

## 2024-07-19 RX ORDER — METOPROLOL SUCCINATE 100 MG/1
TABLET, EXTENDED RELEASE ORAL
Qty: 90 TABLET | Refills: 0 | Status: SHIPPED | OUTPATIENT
Start: 2024-07-19 | End: 2024-08-27

## 2024-07-31 DIAGNOSIS — I10 ESSENTIAL HYPERTENSION: ICD-10-CM

## 2024-07-31 RX ORDER — OLMESARTAN MEDOXOMIL 40 MG/1
40 TABLET ORAL DAILY
Qty: 30 TABLET | Refills: 0 | Status: SHIPPED | OUTPATIENT
Start: 2024-07-31 | End: 2024-08-26

## 2024-07-31 RX ORDER — AMLODIPINE BESYLATE 10 MG/1
10 TABLET ORAL DAILY
Qty: 30 TABLET | Refills: 0 | Status: SHIPPED | OUTPATIENT
Start: 2024-07-31 | End: 2024-08-26

## 2024-08-08 DIAGNOSIS — I10 ESSENTIAL HYPERTENSION: ICD-10-CM

## 2024-08-08 RX ORDER — METOPROLOL SUCCINATE 50 MG/1
TABLET, EXTENDED RELEASE ORAL
Qty: 90 TABLET | Refills: 0 | Status: SHIPPED | OUTPATIENT
Start: 2024-08-08 | End: 2024-08-27

## 2024-08-26 DIAGNOSIS — I10 ESSENTIAL HYPERTENSION: ICD-10-CM

## 2024-08-26 RX ORDER — AMLODIPINE BESYLATE 10 MG/1
10 TABLET ORAL DAILY
Qty: 90 TABLET | Refills: 0 | Status: SHIPPED | OUTPATIENT
Start: 2024-08-26 | End: 2024-08-27

## 2024-08-26 RX ORDER — OLMESARTAN MEDOXOMIL 40 MG/1
40 TABLET ORAL DAILY
Qty: 90 TABLET | Refills: 0 | Status: SHIPPED | OUTPATIENT
Start: 2024-08-26 | End: 2024-08-27

## 2024-08-27 ENCOUNTER — OFFICE VISIT (OUTPATIENT)
Dept: FAMILY MEDICINE | Facility: CLINIC | Age: 49
End: 2024-08-27
Payer: COMMERCIAL

## 2024-08-27 VITALS
BODY MASS INDEX: 40.43 KG/M2 | WEIGHT: 315 LBS | RESPIRATION RATE: 28 BRPM | HEART RATE: 66 BPM | SYSTOLIC BLOOD PRESSURE: 134 MMHG | TEMPERATURE: 97.8 F | OXYGEN SATURATION: 97 % | DIASTOLIC BLOOD PRESSURE: 88 MMHG | HEIGHT: 74 IN

## 2024-08-27 DIAGNOSIS — M54.9 CHRONIC MIDLINE BACK PAIN, UNSPECIFIED BACK LOCATION: ICD-10-CM

## 2024-08-27 DIAGNOSIS — E87.6 HYPOKALEMIA: ICD-10-CM

## 2024-08-27 DIAGNOSIS — F41.1 ANXIETY STATE: ICD-10-CM

## 2024-08-27 DIAGNOSIS — I82.441 ACUTE DEEP VEIN THROMBOSIS (DVT) OF TIBIAL VEIN OF RIGHT LOWER EXTREMITY (H): ICD-10-CM

## 2024-08-27 DIAGNOSIS — G47.33 OSA (OBSTRUCTIVE SLEEP APNEA): ICD-10-CM

## 2024-08-27 DIAGNOSIS — G89.29 CHRONIC FOOT PAIN, UNSPECIFIED LATERALITY: ICD-10-CM

## 2024-08-27 DIAGNOSIS — E66.01 MORBID OBESITY (H): ICD-10-CM

## 2024-08-27 DIAGNOSIS — I10 ESSENTIAL HYPERTENSION: ICD-10-CM

## 2024-08-27 DIAGNOSIS — G89.29 CHRONIC MIDLINE BACK PAIN, UNSPECIFIED BACK LOCATION: ICD-10-CM

## 2024-08-27 DIAGNOSIS — D68.51 HOMOZYGOUS FACTOR V LEIDEN MUTATION (H): ICD-10-CM

## 2024-08-27 DIAGNOSIS — R53.83 OTHER FATIGUE: Primary | ICD-10-CM

## 2024-08-27 DIAGNOSIS — M79.673 CHRONIC FOOT PAIN, UNSPECIFIED LATERALITY: ICD-10-CM

## 2024-08-27 LAB
ERYTHROCYTE [DISTWIDTH] IN BLOOD BY AUTOMATED COUNT: 12.9 % (ref 10–15)
ERYTHROCYTE [SEDIMENTATION RATE] IN BLOOD BY WESTERGREN METHOD: 8 MM/HR (ref 0–20)
HCT VFR BLD AUTO: 46.6 % (ref 40–53)
HGB BLD-MCNC: 16.3 G/DL (ref 13.3–17.7)
MCH RBC QN AUTO: 29.4 PG (ref 26.5–33)
MCHC RBC AUTO-ENTMCNC: 35 G/DL (ref 31.5–36.5)
MCV RBC AUTO: 84 FL (ref 78–100)
PLATELET # BLD AUTO: 295 10E3/UL (ref 150–450)
RBC # BLD AUTO: 5.54 10E6/UL (ref 4.4–5.9)
WBC # BLD AUTO: 11.1 10E3/UL (ref 4–11)

## 2024-08-27 PROCEDURE — 36415 COLL VENOUS BLD VENIPUNCTURE: CPT | Performed by: PHYSICIAN ASSISTANT

## 2024-08-27 PROCEDURE — 86140 C-REACTIVE PROTEIN: CPT | Performed by: PHYSICIAN ASSISTANT

## 2024-08-27 PROCEDURE — 84443 ASSAY THYROID STIM HORMONE: CPT | Performed by: PHYSICIAN ASSISTANT

## 2024-08-27 PROCEDURE — 99214 OFFICE O/P EST MOD 30 MIN: CPT | Performed by: PHYSICIAN ASSISTANT

## 2024-08-27 PROCEDURE — 82728 ASSAY OF FERRITIN: CPT | Performed by: PHYSICIAN ASSISTANT

## 2024-08-27 PROCEDURE — 83540 ASSAY OF IRON: CPT | Performed by: PHYSICIAN ASSISTANT

## 2024-08-27 PROCEDURE — 85652 RBC SED RATE AUTOMATED: CPT | Performed by: PHYSICIAN ASSISTANT

## 2024-08-27 PROCEDURE — 85027 COMPLETE CBC AUTOMATED: CPT | Performed by: PHYSICIAN ASSISTANT

## 2024-08-27 PROCEDURE — 82306 VITAMIN D 25 HYDROXY: CPT | Performed by: PHYSICIAN ASSISTANT

## 2024-08-27 PROCEDURE — 83550 IRON BINDING TEST: CPT | Performed by: PHYSICIAN ASSISTANT

## 2024-08-27 RX ORDER — METOPROLOL SUCCINATE 100 MG/1
100 TABLET, EXTENDED RELEASE ORAL DAILY
Qty: 90 TABLET | Refills: 0 | Status: SHIPPED | OUTPATIENT
Start: 2024-08-27

## 2024-08-27 RX ORDER — POTASSIUM CHLORIDE 1500 MG/1
20 TABLET, EXTENDED RELEASE ORAL DAILY
Qty: 90 TABLET | Refills: 3 | Status: SHIPPED | OUTPATIENT
Start: 2024-08-27

## 2024-08-27 RX ORDER — AMLODIPINE BESYLATE 10 MG/1
10 TABLET ORAL DAILY
Qty: 90 TABLET | Refills: 0 | Status: SHIPPED | OUTPATIENT
Start: 2024-08-27

## 2024-08-27 RX ORDER — HYDROCHLOROTHIAZIDE 25 MG/1
25 TABLET ORAL DAILY
Qty: 90 TABLET | Refills: 1 | Status: SHIPPED | OUTPATIENT
Start: 2024-08-27

## 2024-08-27 RX ORDER — OLMESARTAN MEDOXOMIL 40 MG/1
40 TABLET ORAL DAILY
Qty: 90 TABLET | Refills: 0 | Status: SHIPPED | OUTPATIENT
Start: 2024-08-27

## 2024-08-27 RX ORDER — CITALOPRAM HYDROBROMIDE 20 MG/1
20 TABLET ORAL DAILY
Qty: 90 TABLET | Refills: 0 | Status: SHIPPED | OUTPATIENT
Start: 2024-08-27

## 2024-08-27 RX ORDER — METOPROLOL SUCCINATE 50 MG/1
50 TABLET, EXTENDED RELEASE ORAL DAILY
Qty: 90 TABLET | Refills: 0 | Status: SHIPPED | OUTPATIENT
Start: 2024-08-27

## 2024-08-27 ASSESSMENT — ANXIETY QUESTIONNAIRES
GAD7 TOTAL SCORE: 4
6. BECOMING EASILY ANNOYED OR IRRITABLE: SEVERAL DAYS
7. FEELING AFRAID AS IF SOMETHING AWFUL MIGHT HAPPEN: NOT AT ALL
1. FEELING NERVOUS, ANXIOUS, OR ON EDGE: NOT AT ALL
2. NOT BEING ABLE TO STOP OR CONTROL WORRYING: SEVERAL DAYS
5. BEING SO RESTLESS THAT IT IS HARD TO SIT STILL: NOT AT ALL
8. IF YOU CHECKED OFF ANY PROBLEMS, HOW DIFFICULT HAVE THESE MADE IT FOR YOU TO DO YOUR WORK, TAKE CARE OF THINGS AT HOME, OR GET ALONG WITH OTHER PEOPLE?: SOMEWHAT DIFFICULT
4. TROUBLE RELAXING: SEVERAL DAYS
7. FEELING AFRAID AS IF SOMETHING AWFUL MIGHT HAPPEN: NOT AT ALL
IF YOU CHECKED OFF ANY PROBLEMS ON THIS QUESTIONNAIRE, HOW DIFFICULT HAVE THESE PROBLEMS MADE IT FOR YOU TO DO YOUR WORK, TAKE CARE OF THINGS AT HOME, OR GET ALONG WITH OTHER PEOPLE: SOMEWHAT DIFFICULT
GAD7 TOTAL SCORE: 4
GAD7 TOTAL SCORE: 4
3. WORRYING TOO MUCH ABOUT DIFFERENT THINGS: SEVERAL DAYS

## 2024-08-27 ASSESSMENT — PAIN SCALES - GENERAL: PAINLEVEL: MODERATE PAIN (4)

## 2024-08-27 NOTE — PROGRESS NOTES
Assessment & Plan     Other fatigue  THis is likely multifactorial but we'll broaden ddx below. Suspect weight gain, limited TREMAINE treatment, chronic pain all additional contributors  - Vitamin D Deficiency; Future  - TSH with free T4 reflex; Future  - CBC with platelets; Future  - Iron & Iron Binding Capacity; Future  - Ferritin; Future  - ESR: Erythrocyte sedimentation rate; Future  - CRP, inflammation; Future  - Vitamin D Deficiency  - TSH with free T4 reflex  - CBC with platelets  - Iron & Iron Binding Capacity  - Ferritin  - ESR: Erythrocyte sedimentation rate  - CRP, inflammation    Essential hypertension  Controlled. Continue present management.   - amLODIPine (NORVASC) 10 MG tablet; Take 1 tablet (10 mg) by mouth daily.  - hydrochlorothiazide (HYDRODIURIL) 25 MG tablet; Take 1 tablet (25 mg) by mouth daily.  - metoprolol succinate ER (TOPROL XL) 100 MG 24 hr tablet; Take 1 tablet (100 mg) by mouth daily.  - metoprolol succinate ER (TOPROL XL) 50 MG 24 hr tablet; Take 1 tablet (50 mg) by mouth daily.  - olmesartan (BENICAR) 40 MG tablet; Take 1 tablet (40 mg) by mouth daily.    Anxiety state  Overall ok; some increased rumination about chronic conditions  - citalopram (CELEXA) 20 MG tablet; Take 1 tablet (20 mg) by mouth daily.    Hypokalemia  - potassium chloride ER (K-TAB) 20 MEQ CR tablet; Take 1 tablet (20 mEq) by mouth daily.    TREMAINE (obstructive sleep apnea)  Has appt early 2025 but not currently effective    Morbid obesity (H)  Discussed possible trial of medications. He will think about this vs referral back to weight loss clinic    Chronic foot pain, unspecified laterality  No exam today. This has been chronic and I will recommend return to podiatry  - Physical Therapy  Referral; Future  - Orthopedic  Referral; Future    Chronic midline back pain, unspecified back location  Strongly suggest a daily therapy initiation in addition to physical therapy sessions  - Physical Therapy  " Referral; Future    Acute deep vein thrombosis (DVT) of tibial vein of right lower extremity (H)  Homozygous Factor V Leiden mutation (H24)  He follows with our hematology group          BMI  Estimated body mass index is 47.22 kg/m  as calculated from the following:    Height as of this encounter: 1.88 m (6' 2.02\").    Weight as of this encounter: 166.9 kg (367 lb 14.4 oz).             Debora Fuller is a 49 year old, presenting for the following health issues:  Recheck Medication        8/27/2024     3:49 PM   Additional Questions   Roomed by EVELYN GREENE   Accompanied by Nayana Spouse         8/27/2024     3:49 PM   Patient Reported Additional Medications   Patient reports taking the following new medications None     History of Present Illness       Reason for visit:  Med Check for Refills   He is taking medications regularly.       Medication Followup of ALL  Taking Medication as prescribed: yes  Side Effects:  None  Medication Helping Symptoms:  yes    Jonny Quinones is a 49 year old male who presents today for medication check and discussion of multiple complaints.  Most specifically he has some mid back and chronic foot pain  Together these are significantly impacting his qol  He does not feel well overall - still with chronic foot pain and chronic back pain  Has seen podiatry previously and had benefit from injection before  Have tried orthotics, new shoes, getting pads   -it is severely limiting his ability to move/exercise  Pain is causing emotional concerns - now stress eating  Seeing chiropractor for back   -helps 2-3 days then symptoms return.   Stress eating causing weight gain  Not using cpap; needs new one but has to wait for appt; feeling fatigued      Review of Systems  Constitutional, HEENT, cardiovascular, pulmonary, gi and gu systems are negative, except as otherwise noted.      Objective    /88 (BP Location: Right arm, Patient Position: Sitting, Cuff Size: Adult Large)   Pulse " "66   Temp 97.8  F (36.6  C) (Oral)   Resp 28   Ht 1.88 m (6' 2.02\")   Wt (!) 166.9 kg (367 lb 14.4 oz)   SpO2 97%   BMI 47.22 kg/m    Body mass index is 47.22 kg/m .  Physical Exam   GENERAL: alert and no distress  RESP: lungs clear to auscultation - no rales, rhonchi or wheezes  CV: regular rates and rhythm  MS: feet not examined today  PSYCH: mentation appears normal, affect normal/bright    Diagnostics: see a/p        Signed Electronically by: Robe Gaston PA-C    "

## 2024-08-28 LAB
CRP SERPL-MCNC: 10.3 MG/L
FERRITIN SERPL-MCNC: 387 NG/ML (ref 31–409)
IRON BINDING CAPACITY (ROCHE): 287 UG/DL (ref 240–430)
IRON SATN MFR SERPL: 21 % (ref 15–46)
IRON SERPL-MCNC: 61 UG/DL (ref 61–157)
TSH SERPL DL<=0.005 MIU/L-ACNC: 3.72 UIU/ML (ref 0.3–4.2)
VIT D+METAB SERPL-MCNC: 64 NG/ML (ref 20–50)

## 2024-08-29 ENCOUNTER — THERAPY VISIT (OUTPATIENT)
Dept: PHYSICAL THERAPY | Facility: CLINIC | Age: 49
End: 2024-08-29
Attending: PHYSICIAN ASSISTANT
Payer: COMMERCIAL

## 2024-08-29 DIAGNOSIS — G89.29 CHRONIC FOOT PAIN, UNSPECIFIED LATERALITY: ICD-10-CM

## 2024-08-29 DIAGNOSIS — G89.29 CHRONIC MIDLINE BACK PAIN, UNSPECIFIED BACK LOCATION: ICD-10-CM

## 2024-08-29 DIAGNOSIS — M54.9 CHRONIC MIDLINE BACK PAIN, UNSPECIFIED BACK LOCATION: ICD-10-CM

## 2024-08-29 DIAGNOSIS — M79.673 CHRONIC FOOT PAIN, UNSPECIFIED LATERALITY: ICD-10-CM

## 2024-08-29 PROCEDURE — 97161 PT EVAL LOW COMPLEX 20 MIN: CPT | Mod: GP | Performed by: PHYSICAL THERAPIST

## 2024-08-29 PROCEDURE — 97110 THERAPEUTIC EXERCISES: CPT | Mod: GP | Performed by: PHYSICAL THERAPIST

## 2024-08-29 ASSESSMENT — ACTIVITIES OF DAILY LIVING (ADL)
PUTTING_ON_YOUR_SHOES_OR_SOCKS: A LITTLE BIT OF DIFFICULTY
WALKING_A_MILE: EXTREME DIFFICULTY OR UNABLE TO PERFORM ACTIVITY
SHOPPING: EXTREME DIFFICULTY OR UNABLE TO PERFORM ACTIVITY
PERFORMING_HEAVY_ACTIVITIES_AROUND_YOUR_HOME: QUITE A BIT OF DIFFICULTY
GOING_UP_OR_DOWN_10_STAIRS: A LITTLE BIT OF DIFFICULTY
ANY_OF_YOUR_USUAL_WORK,_HOUSEWORK_OR_SCHOOL_ACTIVITIES: MODERATE DIFFICULTY
RUNNING_ON_EVEN_GROUND: EXTREME DIFFICULTY OR UNABLE TO PERFORM ACTIVITY
LIFTING_AN_OBJECT,_LIKE_A_BAG_OF_GROCERIES_FROM_THE_FLOOR: MODERATE DIFFICULTY
STANDING_FOR_1_HOUR: QUITE A BIT OF DIFFICULTY
MAKING_SHARP_TURNS_WHILE_RUNNING_FAST: EXTREME DIFFICULTY OR UNABLE TO PERFORM ACTIVITY
PLEASE_INDICATE_YOR_PRIMARY_REASON_FOR_REFERRAL_TO_THERAPY:: FOOT AND/OR ANKLE
SQUATTING: A LITTLE BIT OF DIFFICULTY
GETTING_INTO_OR_OUT_OF_A_CAR: MODERATE DIFFICULTY
RUNNING_ON_UNEVEN_GROUND: EXTREME DIFFICULTY OR UNABLE TO PERFORM ACTIVITY
ROLLING_OVER_IN_BED: MODERATE DIFFICULTY
SITTING_FOR_1_HOUR: QUITE A BIT OF DIFFICULTY
LEFS_SCORE(%): 0
LEFS_RAW_SCORE: 0
YOUR_USUAL_HOBBIES,_RECREATIONAL_OR_SPORTING_ACTIVITIES: MODERATE DIFFICULTY
PERFORMING_LIGHT_ACTIVITIES_AROUND_YOUR_HOME: MODERATE DIFFICULTY
WALKING_BETWEEN_ROOMS: A LITTLE BIT OF DIFFICULTY
WALKING_2_BLOCKS: EXTREME DIFFICULTY OR UNABLE TO PERFORM ACTIVITY
GETTING_INTO_AND_OUT_OF_A_BATH: NO DIFFICULTY

## 2024-08-29 NOTE — PROGRESS NOTES
PHYSICAL THERAPY EVALUATION  Type of Visit: Evaluation              Subjective       Presenting condition or subjective complaint:    Date of onset:      Relevant medical history: Depression; DVT (blood clot); High blood pressure; Implanted device; Overweight; Sleep disorder like apnea; Vision problems   Dates & types of surgery:      Prior diagnostic imaging/testing results: Other none   Prior therapy history for the same diagnosis, illness or injury: No      Prior Level of Function  Transfers: Independent  Ambulation: Independent  ADL: Independent      Living Environment  Social support: With a significant other or spouse   Type of home: Multi-level   Stairs to enter the home:         Ramp: No   Stairs inside the home: Yes 10 Is there a railing: Yes     Help at home: None  Equipment owned:       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  Reports he also has back pain and tightness in the backs of his thighs.  Has history of of HNP in his lower back x 5 years.  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  Denies numbness and tingling or weakness in his legs.  Worse: prolonged standing, walking, stairs, sitting;  Better: rest, lying down (for a period of time).  Positive for sneeze/cough for back pain.  Previous therapy for low back which was helpful.       Objective   ANKLE:    Standing Posture: toe out, cavus position bilat,    Gait: decr HS/PO, toe out    SL Balance:   L: <15 sec (R hip drop)  R: <15 sec (L hip drop)    ROM/Strength: (* indicates patient's pain)   AROM L AROM R MMT L MMT R   Dorsiflexion 0 0     Plantarflexion wnl wnl SL heel raises 10/10 SL heel raises 10/10   Inversion (prone) wnl wnl 4/5 4/5   Eversion (prone)  12 12 5/5 5/5   G Toe Ext wnl wnl 5/5 5/5   G Toe Flex 20 20 4/5 4/5     Other Tests:  -Closed Chain Dorsiflexion:       R= cm ; L= cm    Edema:    General: wnl  Figure 8: L:  ; R:     Palpation: tenderness along bilat Gastroc, medial arches    Ankle/Foot Mobilizations (hyper vs hypo):   - Talocrual: hypo  - Subtalar: hypo  - Talonavicular: hypo  - Calcaneocuboid: wnl  - Cubonavicular-cuniform: wnl    Special Tests:   L R   Anterior Drawer     Posterior Drawer     Valgus Stress     Varus Stress     External Rotation     Alejandro's (Achilles)     Calcaneal tap     Squeeze test     Windlass Test     Joao's (DVT)           Assessment & Plan   CLINICAL IMPRESSIONS  Medical Diagnosis:      Treatment Diagnosis:     Impression/Assessment: Patient is a 49 year old male with bilat foot complaints.  The following significant findings have been identified: Pain, Decreased ROM/flexibility, Decreased joint mobility, Decreased strength, Impaired gait, Impaired muscle performance, and Decreased activity tolerance. These impairments interfere with their ability to perform work tasks, recreational activities, and community mobility as compared to previous level of function.     Clinical Decision Making (Complexity):  Clinical Presentation: Stable/Uncomplicated  Clinical Presentation Rationale: based on medical and personal factors listed in PT evaluation  Clinical Decision Making (Complexity): Low complexity    PLAN OF CARE  Treatment Interventions:  Interventions: Manual Therapy, Neuromuscular Re-education, Therapeutic Activity, Therapeutic Exercise    Long Term Goals            Frequency of Treatment:    Duration of Treatment:        Recommended Referrals to Other Professionals:   Education Assessment:        Risks and benefits of evaluation/treatment have been explained.   Patient/Family/caregiver agrees with Plan of Care.     Evaluation Time:             Signing Clinician: Aster Hillman, PT

## 2024-09-05 ENCOUNTER — OFFICE VISIT (OUTPATIENT)
Dept: PODIATRY | Facility: CLINIC | Age: 49
End: 2024-09-05
Attending: PHYSICIAN ASSISTANT
Payer: COMMERCIAL

## 2024-09-05 ENCOUNTER — THERAPY VISIT (OUTPATIENT)
Dept: PHYSICAL THERAPY | Facility: CLINIC | Age: 49
End: 2024-09-05
Attending: PHYSICIAN ASSISTANT
Payer: COMMERCIAL

## 2024-09-05 VITALS — WEIGHT: 315 LBS | HEIGHT: 74 IN | OXYGEN SATURATION: 95 % | HEART RATE: 66 BPM | BODY MASS INDEX: 40.43 KG/M2

## 2024-09-05 DIAGNOSIS — M62.40 CONTRACTED, TENDON: ICD-10-CM

## 2024-09-05 DIAGNOSIS — G57.63 MORTON'S NEUROMA OF BOTH FEET: Primary | ICD-10-CM

## 2024-09-05 DIAGNOSIS — G89.29 CHRONIC FOOT PAIN, UNSPECIFIED LATERALITY: Primary | ICD-10-CM

## 2024-09-05 DIAGNOSIS — M54.9 CHRONIC MIDLINE BACK PAIN, UNSPECIFIED BACK LOCATION: ICD-10-CM

## 2024-09-05 DIAGNOSIS — M79.673 CHRONIC FOOT PAIN, UNSPECIFIED LATERALITY: Primary | ICD-10-CM

## 2024-09-05 DIAGNOSIS — G89.29 CHRONIC MIDLINE BACK PAIN, UNSPECIFIED BACK LOCATION: ICD-10-CM

## 2024-09-05 DIAGNOSIS — M21.6X9 ACQUIRED CAVUS FOOT DEFORMITY: ICD-10-CM

## 2024-09-05 PROCEDURE — 99213 OFFICE O/P EST LOW 20 MIN: CPT | Mod: 25 | Performed by: PODIATRIST

## 2024-09-05 PROCEDURE — 64450 NJX AA&/STRD OTHER PN/BRANCH: CPT | Mod: T7 | Performed by: PODIATRIST

## 2024-09-05 PROCEDURE — 97530 THERAPEUTIC ACTIVITIES: CPT | Mod: GP | Performed by: PHYSICAL THERAPIST

## 2024-09-05 PROCEDURE — 97110 THERAPEUTIC EXERCISES: CPT | Mod: GP | Performed by: PHYSICAL THERAPIST

## 2024-09-05 RX ORDER — LIDOCAINE HYDROCHLORIDE 20 MG/ML
0.5 INJECTION, SOLUTION INFILTRATION; PERINEURAL ONCE
Status: COMPLETED | OUTPATIENT
Start: 2024-09-05 | End: 2024-09-05

## 2024-09-05 RX ORDER — TRIAMCINOLONE ACETONIDE 40 MG/ML
20 INJECTION, SUSPENSION INTRA-ARTICULAR; INTRAMUSCULAR ONCE
Status: COMPLETED | OUTPATIENT
Start: 2024-09-05 | End: 2024-09-05

## 2024-09-05 RX ADMIN — TRIAMCINOLONE ACETONIDE 20 MG: 40 INJECTION, SUSPENSION INTRA-ARTICULAR; INTRAMUSCULAR at 14:20

## 2024-09-05 RX ADMIN — LIDOCAINE HYDROCHLORIDE 0.5 ML: 20 INJECTION, SOLUTION INFILTRATION; PERINEURAL at 14:20

## 2024-09-05 ASSESSMENT — PAIN SCALES - GENERAL: PAINLEVEL: MODERATE PAIN (5)

## 2024-09-05 NOTE — Clinical Note
9/5/2024      Jonny Quinones  [address] [address]      Dear Colleague,    Thank you for referring your patient, Jonny Quinones, to the [facility]. Please see a copy of my visit note below.    No notes on file    Again, thank you for allowing me to participate in the care of your patient.        Sincerely,        Deni Steiner DPM

## 2024-09-05 NOTE — PROGRESS NOTES
FOOT AND ANKLE SURGERY/PODIATRY Progress Note        ASSESSMENT:   Blanchard's neuroma bilateral feet  Cavus foot deformity bilaterally  Contracted Achilles tendon bilaterally    HPI: Jonny Quinones was seen again today complaining of moderate to severe bilateral foot pain.  The patient indicated that he has had pain involving both feet for at least 2 years.  He has been treated in the past for neuroma on the left foot.  He was given a cortisone injection 2 years ago.  He stated that that did give him some relief.  He continues to have pain on the ball of both feet.  It is an aching type pain which is aggravated with prolonged weightbearing and ambulation.  The pain is only relieved with nonweightbearing.  He does not recall any trauma to his feet.  He has not had any associated redness or swelling.    Past Medical History:   Diagnosis Date    Abscess of lung (H) 2/5/2019    Overview:  H/O    Anxiety     Depressive disorder     Factor V Leiden (H24)     Gout     Hypertension     Pre-diabetes     Sleep apnea     Vitamin D deficiency         Past Surgical History:   Procedure Laterality Date    athroereisis  03/27/2009    MI Subtalar    COLONOSCOPY N/A 9/29/2022    Procedure: COLONOSCOPY;  Surgeon: Anoop Dailey MD;  Location:  GI    ENT SURGERY Bilateral 1985    tonsilectomy    EXCISE MASS FINGER Right 11/17/2023    Procedure: EXCISION, MASS, RIGHT LONG FINGER;  Surgeon: Deep Valdivia MD;  [place] [facility] [place]    LAPAROSCOPIC GASTRIC ADJUSTABLE BANDING  11/2009    SINUS SURGERY  2001    sphincterotomy  02/2009    lateral internal; anal fissure       Allergies   Allergen Reactions    Allopurinol GI Disturbance    Amoxicillin Other (See Comments)     headaches    Bupropion Other (See Comments)    Seasonal Allergies      Dust, mold, pollen      Venlafaxine Other (See Comments)         Current Outpatient Medications:     amLODIPine (NORVASC) 10 MG tablet, Take 1 tablet (10 mg) by mouth daily., Disp: 90 tablet, Rfl: 0     Cholecalciferol (VITAMIN D3) 50 MCG (2000 UT) CAPS, Take 2 capsules by mouth once daily., Disp: 180 capsule, Rfl: 0    citalopram (CELEXA) 20 MG tablet, Take 1 tablet (20 mg) by mouth daily., Disp: 90 tablet, Rfl: 0    hydrochlorothiazide (HYDRODIURIL) 25 MG tablet, Take 1 tablet (25 mg) by mouth daily., Disp: 90 tablet, Rfl: 1    metoprolol succinate ER (TOPROL XL) 100 MG 24 hr tablet, Take 1 tablet (100 mg) by mouth daily., Disp: 90 tablet, Rfl: 0    metoprolol succinate ER (TOPROL XL) 50 MG 24 hr tablet, Take 1 tablet (50 mg) by mouth daily., Disp: 90 tablet, Rfl: 0    olmesartan (BENICAR) 40 MG tablet, Take 1 tablet (40 mg) by mouth daily., Disp: 90 tablet, Rfl: 0    potassium chloride ER (K-TAB) 20 MEQ CR tablet, Take 1 tablet (20 mEq) by mouth daily., Disp: 90 tablet, Rfl: 3    rivaroxaban ANTICOAGULANT (XARELTO ANTICOAGULANT) 20 MG TABS tablet, TAKE 1 TABLET (20 MG) BY MOUTH DAILY (WITH DINNER), Disp: 90 tablet, Rfl: 0    UNABLE TO FIND, MEDICATION NAME: CPAP machine, Disp: , Rfl:     Current Facility-Administered Medications:     lidocaine 1 % injection 0.5 mL, 0.5 mL, , , Jonny Thompson, ARY, 0.5 mL at 02/08/22 1118    triamcinolone (KENALOG-40) injection 40 mg, 40 mg, , , Jonny Thompson DPM, 40 mg at 02/08/22 1118    Family History   Problem Relation Age of Onset    Allergies Mother     Blood Disease Mother         Leiden Factor 5    Cerebrovascular Disease Mother     Hypertension Father     Hypertension Maternal Grandmother     Peripheral Vascular Disease Maternal Grandmother     Thrombosis Paternal Grandfather     Allergies Brother     Breast Cancer Paternal Aunt     Colon Cancer No family hx of        Social History     Socioeconomic History    Marital status:      Spouse name: Not on file    Number of children: Not on file    Years of education: Not on file    Highest education level: Not on file   Occupational History    Not on file   Tobacco Use    Smoking status: Never     Passive  "exposure: Never    Smokeless tobacco: Never   Vaping Use    Vaping status: Never Used   Substance and Sexual Activity    Alcohol use: No    Drug use: No    Sexual activity: Yes     Partners: Female     Birth control/protection: I.U.D.   Other Topics Concern    Parent/sibling w/ CABG, MI or angioplasty before 65F 55M? No   Social History Narrative    Not on file     Social Determinants of Health     Financial Resource Strain: Low Risk  (12/2/2023)    Financial Resource Strain     Within the past 12 months, have you or your family members you live with been unable to get utilities (heat, electricity) when it was really needed?: No   Food Insecurity: Low Risk  (12/2/2023)    Food Insecurity     Within the past 12 months, did you worry that your food would run out before you got money to buy more?: No     Within the past 12 months, did the food you bought just not last and you didn t have money to get more?: No   Transportation Needs: Low Risk  (12/2/2023)    Transportation Needs     Within the past 12 months, has lack of transportation kept you from medical appointments, getting your medicines, non-medical meetings or appointments, work, or from getting things that you need?: No   Physical Activity: Not on file   Stress: Not on file   Social Connections: Unknown (12/28/2021)    [facility]    Social Connections     Frequency of Communication with Friends and Family: Not on file   Interpersonal Safety: Not on file   Housing Stability: Low Risk  (12/2/2023)    Housing Stability     Do you have housing? : Yes     Are you worried about losing your housing?: No       10 point Review of Systems is negative .     Pulse 66   Ht 1.88 m (6' 2.02\")   Wt (!) 166.5 kg (367 lb)   SpO2 95%   BMI 47.09 kg/m      BMI= Body mass index is 47.09 kg/m .    OBJECTIVE:  General appearance: Patient is alert and fully cooperative with history & " exam.  No sign of distress is noted during the visit.  Vascular: Dorsalis pedis and posterior tibial pulses are palpable. There is pedal hair growth bilaterally.  CFT < 3 sec from anterior tibial surface to distal digits bilaterally. There is no appreciable edema noted.  Dermatologic: Turgor and texture are within normal limits. No coloration or temperature changes. No primary or secondary lesions noted.  Neurologic: All epicritic and proprioceptive sensations are grossly intact bilaterally.  There is a very pronounced positive Holly sign noted third intermetatarsal space right foot.  Musculoskeletal: All active and passive ankle, subtalar, midtarsal, and 1st MPJ range of motion are grossly intact. Manual muscle strength is within normal limits bilaterally. All dorsiflexors, plantarflexors, invertors, evertors are intact bilaterally. Tenderness present to third intermetatarsal space bilaterally on palpation.  Mild tenderness to bilateral feet with range of motion.  Increased height of the medial longitudinal arch noted bilaterally.  There is significant decrease in amount of dorsiflexion available at bilateral ankles when the feet are maximally dorsiflexed while the legs are extended.  Calf is soft/non-tender without warmth/induration    Imaging:         No results found.         TREATMENT:  The patient was given a cortisone injection at the level of the common digital nerve at the third intermetatarsal space right foot.  The injection consisted of 1/4 cc of Kenalog and 1/4 cc of 2% lidocaine plain.  I have also recommended orthotics.  After wearing his orthotics consistently for 2 to 3 months if his pain persist I have asked the patient to return to clinic for follow-up visit.  I told the patient that if his symptoms do not improve or they progress I would recommend a gastrocnemius recession of both feet along with excision of the Blanchard's neuroma.        Deni Steiner; ARY  [facility] Foot [facility] Ankle  Surgery/Podiatry

## 2024-09-05 NOTE — PATIENT INSTRUCTIONS
What are Prescription Custom Orthotics?  Custom orthotics are specially-made devices designed to support and comfort your feet. Prescription orthotics are crafted for you and no one else. They match the contours of your feet precisely and are designed for the way you move. Orthotics are only manufactured after a podiatrist has conducted a complete evaluation of your feet, ankles, and legs, so the orthotic can accommodate your unique foot structure and pathology.  Prescription orthotics are divided into two categories:  Functional orthotics are designed to control abnormal motion. They may be used to treat foot pain caused by abnormal motion; they can also be used to treat injuries such as shin splints or tendinitis. Functional orthotics are usually crafted of a semi-rigid material such as plastic or graphite.  Accommodative orthotics are softer and meant to provide additional cushioning and support. They can be used to treat diabetic foot ulcers, painful calluses on the bottom of the foot, and other uncomfortable conditions.  Podiatrists use orthotics to treat foot problems such as plantar fasciitis, bursitis, tendinitis, diabetic foot ulcers, and foot, ankle, and heel pain. Clinical research studies have shown that podiatrist-prescribed foot orthotics decrease foot pain and improve function.  Orthotics typically cost more than shoe inserts purchased in a retail store, but the additional cost is usually well worth it. Unlike shoe inserts, orthotics are molded to fit each individual foot, so you can be sure that your orthotics fit and do what they're supposed to do. Prescription orthotics are also made of top-notch materials and last many years when cared for properly. Insurance often helps pay for prescription orthotics.  What are Shoe Inserts?   You've seen them at the grocery store and at the mall. You've probably even seen them on TV and online. Shoe inserts are any kind of non-prescription foot support designed  to be worn inside a shoe. Pre-packaged, mass produced, arch supports are shoe inserts. So are the  custom-made  insoles and foot supports that you can order online or at retail stores. Unless the device has been prescribed by a doctor and crafted for your specific foot, it's a shoe insert, not a custom orthotic device--despite what the ads might say.  Shoe inserts can be very helpful for a variety of foot ailments, including flat arches and foot and leg pain. They can cushion your feet, provide comfort, and support your arches, but they can't correct biomechanical foot problems or cure long-standing foot issues.  The most common types of shoe inserts are:  Arch supports: Some people have high arches. Others have low arches or flat feet. Arch supports generally have a  bumped-up  appearance and are designed to support the foot's natural arch.   Insoles: Insoles slip into your shoe to provide extra cushioning and support. Insoles are often made of gel, foam, or plastic.   Heel liners: Heel liners, sometimes called heel pads or heel cups, provide extra cushioning in the heel region. They may be especially useful for patients who have foot pain caused by age-related thinning of the heels' natural fat pads.   Foot cushions: Do your shoes rub against your heel or your toes? Foot cushions come in many different shapes and sizes and can be used as a barrier between you and your shoe.  Choosing an Over-the-Counter Shoe Insert  Selecting a shoe insert from the wide variety of devices on the market can be overwhelming. Here are some podiatrist-tested tips to help you find the insert that best meets your needs:  Consider your health. Do you have diabetes? Problems with circulation? An over-the-counter insert may not be your best bet. Diabetes and poor circulation increase your risk of foot ulcers and infections, so schedule an appointment with a podiatrist. He or she can help you select a solution that won't cause additional  health problems.   Think about the purpose. Are you planning to run a marathon, or do you just need a little arch support in your work shoes? Look for a product that fits your planned level of activity.   Bring your shoes. For the insert to be effective, it has to fit into your shoes. So bring your sneakers, dress shoes, or work boots--whatever you plan to wear with your insert. Look for an insert that will fit the contours of your shoe.   Try them on. If all possible, slip the insert into your shoe and try it out. Walk around a little. How does it feel? Don't assume that feelings of pressure will go away with continued wear. (If you can't try the inserts at the store, ask about the store's return policy and hold on to your receipt.)    Please call one of the [place] locations below to schedule an appointment. If you received a prescription please bring it with you to your appointment. Some locations are limited to what they carry.    Office Locations    [facility]  [address] [place] [address]  Home Medical Equipment, [address]   Phone: [phone]   Orthotics and Prosthetics, [address]   Phone: [phone]    [facility]   Home Medical Equipment   [address], [place] [address]  Phone :[phone]  Orthotics and Prosthetics   [address], [facility] [address]  Phone:[phone]          [facility] [place]  [address]  [address]   [place] [address]   Phone: [phone]    [facility]  [address]  [place] [address]  Phone: [phone]    [facility]  [address]  [place] [address]  Phone: [phone]  Fax: [phone]    [facility]   [address]  [place] [address]  Phone:  [phone]    [facility]  [address]  [place] [address]  Phone: [phone]    [facility]  [address]  [place] [address]  Phone: [phone]    [place]   [address] [place] [address]   Phone: [phone]    WEARING YOUR CUSTOM FOOT ORTHOTICS   Most insurance plans cover one pair of orthotics per year. You must check with your   insurance plan to see what your payment responsibility will be. Please call your   insurance company by calling the number on the back of your insurance card.   Orthotic's are non-refundable and non-returnable.   Orthotics are made of various designs. Some orthotics are covered with material that extends beyond your toes. If your orthotic is of this design, you will likely need to trim the toe end to get a proper fit. The insole from your shoe can be used as a template. Simply overlay the shoe insert on top of the custom orthotic. Align the heel end while tracing the length of the insert onto the custom orthotic. Use a large scissor to trim the toe end until you get a proper fit in the shoe.   The orthotic needs to be pushed as far back in the shoe as possible. The heel portion should not ride forward so as not to irritate your heel.   Orthotics are designed to work with socks. Excessive perspiration will shorten the life span of the orthotics. Remove the orthotic from the shoe frequently for proper drying.   The break-in period lasts for weeks. People new to orthotics will likely experience new aches and pains. The orthotic is forcing your foot into a new position. Arch, foot and leg muscle aches and fatigue are common during these weeks. Minor discomfort can be considered normal break in phenomenon. Start wearing your orthotic around your home your first day. Limited activity for one to two hours is recommended. You can increase one or two additional hours each  day provided the aches and pains are subsiding. The degree of discomfort, fatigue and problems will dictate the speed of break in. You may require multiple weeks to work up to full time use.   Do not continue wearing your orthotics if they are creating problems such as blisters or sores. Do not hesitate to call the clinic to speak with a nurse regarding orthotic   break in, fit, trimming, etc. You may also need to see the doctor if the orthotics are   simply not working out. Adjustments are sometimes made to improve orthotic   function.     Orthotics will only work in certain styles and types of shoes. Orthotics rarely work in dress shoes. Slip-ons, clogs, sandals and heels are particularly troublesome. Specially designed orthotics may be necessary for these types of shoes. Your custom orthotic was designed for activities that require appropriate walking or running shoes. Lace up athletic shoes, walking shoes or work boots should work appropriately. You may need a wider or longer shoe. Shoes with a removable  or insert work best. In general, you want to remove an insert from the shoe before placing the orthotic into the shoe. Shoes without a removable liner may not work as well.     When purchasing new shoes, bring your orthotics along to get a proper fit. Shop at stores that are familiar with orthotics.   Frequent washing of the orthotic may shorten the life span of the top cover. The top cover can be replaced but will generally last one to five years depending on use and foot perspiration.

## 2024-09-07 ENCOUNTER — MYC REFILL (OUTPATIENT)
Dept: FAMILY MEDICINE | Facility: CLINIC | Age: 49
End: 2024-09-07
Payer: COMMERCIAL

## 2024-09-07 DIAGNOSIS — I10 ESSENTIAL HYPERTENSION: ICD-10-CM

## 2024-09-07 DIAGNOSIS — F41.1 ANXIETY STATE: ICD-10-CM

## 2024-09-09 ENCOUNTER — MYC MEDICAL ADVICE (OUTPATIENT)
Dept: FAMILY MEDICINE | Facility: CLINIC | Age: 49
End: 2024-09-09
Payer: COMMERCIAL

## 2024-09-09 RX ORDER — CITALOPRAM HYDROBROMIDE 20 MG/1
20 TABLET ORAL DAILY
Qty: 90 TABLET | Refills: 0 | OUTPATIENT
Start: 2024-09-09

## 2024-09-09 RX ORDER — HYDROCHLOROTHIAZIDE 25 MG/1
25 TABLET ORAL DAILY
Qty: 90 TABLET | Refills: 1 | OUTPATIENT
Start: 2024-09-09

## 2024-09-18 NOTE — PROGRESS NOTES
[facility]  [address] [place] [address]  Main: [phone], Fax: [phone]    Patient seen at: [facility] at [address] [place] [address]    Outpatient Visit Note:    Patient: Jonny Quinones  MRN: [id number]  : 1975  DIO: 2024  Location of this writer at the time of this clinic visit was conducted: [facility].  Location of the patient at the time of this clinic visit was conducted: [facility].     Reason for visit:  Right posterior tibial vein thrombosis on 2023.      Clinical History Summary:  Jonny is a 48 year old male with a history of homozygous factor V Leiden mutation, hypertension, who is found to have a right posterior tibial vein thrombosis on 2023, currently on rivaroxaban at 20 mg PO Qday dosing, returns to clinic today for his annual follow up visit. He was last seen by this writer back on 2023.     Thrombosis History Summary:  Jonny has a family history of factor V Leiden mutation with his mother (who was tested after having a stroke in her 50's) and apparently his father  of a fatal pulmonary embolism in 2015 while he was being treated for plantar fasciitis.   After his father's death, he had a routine follow up with his primary care provider and because of his family history, Jonny at the time underwent factor V Leiden mutation testing and he was found to have homozygous factor V Leiden mutation.   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 In addition to the drive, he recalls that he was sick with a sinus infection prior to his trip to [place].   2023, he  started to experience some right calf pain and swelling that worsened in the following days.   4/16/2023, he presented to the emergency department with a 3-4 days history of right leg pain and swelling. Venous ultrasound of the right leg was done and showed acute DVT within the right posterior tibial vein. He was placed on apixaban and discharged.   He established care with this writer on 5/9/2023. Recommended that he should stay on full intensity anticoagulation therapy for at least 3 months and then return to see me to discuss future VTE prevention strategy.   5/10/2023, he presented to the emergency department with epistaxis. With conservative measures, his epistaxis did stop.   7/17/2023, repeat right leg venous ultrasound showed no acute DVT and likely chronic changes of previous DVT in the posterior tibial veins of the calf.     Interim History:  9/14/2023, he was seen by Dr. Rios of orthopedics in regard to a myxoid cyst of the finger.   11/17/2023, he underwent his cyst excision surgery. Was told to hold his rivaroxaban for 48 hours prior to the procedure. No bleeding complications.   9/5/2024, he was given a cortisone injection at the level of the common digital nerve at the third intermetatarsal space right foot for Blanchard's neuroma by [facility].     Currently she is on rivaroxaban at 20 mg PO Qday dosing. He denies any bleeding complications. He reports that he is not anticipating any invasive or surgical procedures in this upcoming year.       ROS:  Denies any bleeding complications. Specifically, no frequent epistaxis. No issues with oral mucosal bleeding. Denies any hematuria or blood in stools. Denies any shortness of breath. No chest pain. No cough. No fever.      Medications, Allergies and PmHx:  All are reviewed by this writer today via electronic medical records    Social History:   Deferred.    Family History:  Deferred.    Objective:  Vitals: BP (!) 138/97 (BP  "Location: Right arm, Patient Position: Sitting, Cuff Size: Adult Large)   Pulse 66   Temp 98.1  F (36.7  C) (Oral)   Ht 1.88 m (6' 2.02\")   Wt (!) 166 kg (366 lb)   SpO2 97%   BMI 46.97 kg/m    Exam:   Complete exam is not performed today.       Labs:  Component      Latest Ref Rng 12/5/2023  10:31 AM 8/27/2024  4:35 PM   Sodium      135 - 145 mmol/L 140     Potassium      3.4 - 5.3 mmol/L 3.7     Carbon Dioxide (CO2)      22 - 29 mmol/L 27     Anion Gap      7 - 15 mmol/L 10     Urea Nitrogen      6.0 - 20.0 mg/dL 13.5     Creatinine      0.67 - 1.17 mg/dL 0.92     GFR Estimate      >60 mL/min/1.73m2 >90     Calcium      8.6 - 10.0 mg/dL 9.4     Chloride      98 - 107 mmol/L 103     Glucose      70 - 99 mg/dL 93     Alkaline Phosphatase      40 - 150 U/L 68     AST      0 - 45 U/L 34     ALT      0 - 70 U/L 42     Protein Total      6.4 - 8.3 g/dL 7.5     Albumin      3.5 - 5.2 g/dL 4.1     Bilirubin Total      <=1.2 mg/dL 0.5     WBC      4.0 - 11.0 10e3/uL  11.1 (H)    RBC Count      4.40 - 5.90 10e6/uL  5.54    Hemoglobin      13.3 - 17.7 g/dL  16.3    Hematocrit      40.0 - 53.0 %  46.6    MCV      78 - 100 fL  84    MCH      26.5 - 33.0 pg  29.4    MCHC      31.5 - 36.5 g/dL  35.0    RDW      10.0 - 15.0 %  12.9    Platelet Count      150 - 450 10e3/uL  295       Assessment:  In summary, Jonny is a 48 year old male with a history of homozygous factor V Leiden mutation, hypertension, who is found to have a right posterior tibial vein thrombosis on 4/16/2023, currently on rivaroxaban at 20 mg PO Qday dosing, returns to clinic today for his annual follow up visit     Jonny's right posterior tibial vein thrombosis on 4/16/2023 was a provoked event. This was provoked by his long distance shae to [facility] and back to [place] a few days prior to the development of his right leg DVT symptoms.      Note also that Jonny has homozygous factor V Leiden mutation and that his father did have a fatal " pulmonary embolic event back in 2015.      Diagnosis:  Provoked right tibial vein thrombosis on 4/16/2023.  Homozygous factor V Leiden mutation.  Family history of factor V Leiden mutation.  Family history of fatal pulmonary embolism with his father in 2015.     Plan:  No change in regard to his anticoagulation therapy. He remains a good candidate to stay on indefinite anticoagulation therapy with rivaroxaban at 20 mg PO Qday dosing. I have renewed his rivaroxaban prescription today with a one year refill.    He has some questions about rivaroxaban today which I have answered them all to his satisfaction.     Patient is instructed to call our clinic if he should experience any unusual bleeding complications or if he should need any invasive or surgical procedures in the future. Otherwise, will plan to see him back in one year for routine follow up. Virtual or in-person visit is fine.         Jeovanny Cordero PA-C, MPAS  Physician Assistant  [facility].     The longitudinal plan of care for these conditions below were addressed during this visit. Due to the added complexity in care, I will continue to support Jonny Quinones  in the subsequent management of these conditions and with the ongoing continuity of care of these conditions.    Problem List Items Addressed This Visit as of 2/19/2024   Provoked right tibial vein thrombosis on 4/16/2023.  Homozygous factor V Leiden mutation.  Family history of factor V Leiden mutation.  Family history of fatal pulmonary embolism with his father in 2015.       20 minutes spent by me on the date of the encounter doing chart review, history and exam, documentation and further activities per the note.    Time IN: 14:00  Time OUT: 14:15

## 2024-09-30 ENCOUNTER — OFFICE VISIT (OUTPATIENT)
Dept: HEMATOLOGY | Facility: CLINIC | Age: 49
End: 2024-09-30
Attending: PHYSICIAN ASSISTANT
Payer: COMMERCIAL

## 2024-09-30 VITALS
WEIGHT: 315 LBS | DIASTOLIC BLOOD PRESSURE: 97 MMHG | HEIGHT: 74 IN | HEART RATE: 66 BPM | OXYGEN SATURATION: 97 % | TEMPERATURE: 98.1 F | BODY MASS INDEX: 40.43 KG/M2 | SYSTOLIC BLOOD PRESSURE: 138 MMHG

## 2024-09-30 DIAGNOSIS — Z82.49 FAMILY HISTORY OF DVT: ICD-10-CM

## 2024-09-30 DIAGNOSIS — I82.441 ACUTE DEEP VEIN THROMBOSIS (DVT) OF TIBIAL VEIN OF RIGHT LOWER EXTREMITY (H): ICD-10-CM

## 2024-09-30 DIAGNOSIS — D68.51 HOMOZYGOUS FACTOR V LEIDEN MUTATION (H): Primary | ICD-10-CM

## 2024-09-30 DIAGNOSIS — Z86.718 PERSONAL HISTORY OF DVT (DEEP VEIN THROMBOSIS): ICD-10-CM

## 2024-09-30 DIAGNOSIS — Z83.2 FAMILY HISTORY OF FACTOR V LEIDEN MUTATION: ICD-10-CM

## 2024-09-30 PROCEDURE — 99213 OFFICE O/P EST LOW 20 MIN: CPT | Performed by: PHYSICIAN ASSISTANT

## 2024-09-30 PROCEDURE — G2211 COMPLEX E/M VISIT ADD ON: HCPCS | Performed by: PHYSICIAN ASSISTANT

## 2024-10-16 ENCOUNTER — TELEPHONE (OUTPATIENT)
Dept: HEMATOLOGY | Facility: CLINIC | Age: 49
End: 2024-10-16
Payer: COMMERCIAL

## 2024-10-16 NOTE — CONFIDENTIAL NOTE
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 She reports that Jonny woke up this morning at 5:30 am and felt something \"pop\" in his nose. He then experienced a very heavy nose bleed from his right nostril. They tried conservative measures (holding pressure, afrin etc.). They were able to stop the bleeding after about 30/40 minutes.     This has happened to him before and they went to the emergency room. He was in the emergency room for about 6 hours while they worked to get it to stop. They tried the rhino rocket, nose clips, ice etc. They were finally able to get it to stop after applying \"acid on a stick\". They did not cauterize any vessels at that time.    She is calling now because his nose is re-bleeding. Per Jonny's son a blood clot fell out that was the size of \"a jalapeno\". It has been ongoing for 40 minutes. They were told to go to the emergency room only if the bleeding lasts greater than 1 hr and 30 minutes. She is wondering if they should come into clinic instead.     RN did let them know that in clinic we would attempt all of the same measures they have done at home. We do not have access to a rhino rocket or silver nitrate. RN will discuss thoughts with Jeovanny Cordero PA-C and Nayana can be called back with guidance.     Keeley LOGAN, RN, PHN  [facility]  Office: [phone]  Fax: [phone]    Addendum     Per Jeovanny Cordero PA-C okay to hold Xarelto for a few days (2-3) while waiting for nose to heal. He should follow ER recommendations of seeking care in ED if nosebleed is greater than 1.5 hours. If nosebleeds persists over the next few days can consider ENT referral to see if cauterization necessary. RN communicated this to Nayana. She reports bleeding was stopped after 50 danny minutes. They will use " the above guidelines to dictate their care. We will call them tomorrow morning to check in to see how the rest of today went.     Keeley Waters  MSN, RN, PHN  [facility]  Office: [phone]  Fax: [phone]    Addendum  Nayana called back letting us know his nosebleed got worse. They did go into the ER. He has a rhino rocket in place and a urgent ENT referral placed. Jonny will hold his Xarelto for 2 days to promote healing and resume Saturday. They have our contact information for any further questions, comments or concerns.     Keeley LOGAN, RN, PHN  [facility]  Office: [phone]  Fax: [phone]

## 2024-10-25 ENCOUNTER — TELEPHONE (OUTPATIENT)
Dept: HEMATOLOGY | Facility: CLINIC | Age: 49
End: 2024-10-25
Payer: COMMERCIAL

## 2024-10-25 NOTE — CONFIDENTIAL NOTE
[id number]  Jonny Quinones  49 year old male  [facility] Diagnosis: Homozygous FVL, posterior tibial vein thrombosis  [facility] Provider: Jeovanny Cordero PA-C    Follow up on epistaxis. Nayana called in to let us know that Jonny had his nose cauterized and he hasn't had any bleeding since then. They are wondering when it is okay to resume his Xarelto. His cautery was Tuesday 10/22/24. Per Jeovanny Cordero PA-C okay to resume. This was communicated to Nayana. They will restart his Xarelto and call with any further questions, comments, or concerns.     Keeley Waters  MSN, RN, PHN  [facility]  Office: [phone]  Fax: [phone]

## 2024-11-05 ENCOUNTER — PATIENT OUTREACH (OUTPATIENT)
Dept: CARE COORDINATION | Facility: CLINIC | Age: 49
End: 2024-11-05
Payer: COMMERCIAL

## 2024-11-19 ENCOUNTER — PATIENT OUTREACH (OUTPATIENT)
Dept: CARE COORDINATION | Facility: CLINIC | Age: 49
End: 2024-11-19

## 2024-11-19 ENCOUNTER — OFFICE VISIT (OUTPATIENT)
Dept: SLEEP MEDICINE | Facility: CLINIC | Age: 49
End: 2024-11-19
Attending: PHYSICIAN ASSISTANT
Payer: COMMERCIAL

## 2024-11-19 VITALS
SYSTOLIC BLOOD PRESSURE: 154 MMHG | DIASTOLIC BLOOD PRESSURE: 101 MMHG | HEART RATE: 70 BPM | RESPIRATION RATE: 16 BRPM | WEIGHT: 315 LBS | OXYGEN SATURATION: 95 % | HEIGHT: 74 IN | BODY MASS INDEX: 40.43 KG/M2

## 2024-11-19 DIAGNOSIS — F51.04 PSYCHOPHYSIOLOGICAL INSOMNIA: Primary | ICD-10-CM

## 2024-11-19 DIAGNOSIS — G47.33 OSA (OBSTRUCTIVE SLEEP APNEA): ICD-10-CM

## 2024-11-19 PROCEDURE — 99204 OFFICE O/P NEW MOD 45 MIN: CPT | Performed by: INTERNAL MEDICINE

## 2024-11-19 RX ORDER — PREGABALIN 75 MG/1
75 CAPSULE ORAL AT BEDTIME
Qty: 90 CAPSULE | Refills: 0 | Status: SHIPPED | OUTPATIENT
Start: 2024-11-19 | End: 2025-02-17

## 2024-11-19 ASSESSMENT — SLEEP AND FATIGUE QUESTIONNAIRES
HOW LIKELY ARE YOU TO NOD OFF OR FALL ASLEEP WHILE LYING DOWN TO REST IN THE AFTERNOON WHEN CIRCUMSTANCES PERMIT: WOULD NEVER DOZE
HOW LIKELY ARE YOU TO NOD OFF OR FALL ASLEEP WHILE SITTING AND READING: WOULD NEVER DOZE
HOW LIKELY ARE YOU TO NOD OFF OR FALL ASLEEP WHILE WATCHING TV: SLIGHT CHANCE OF DOZING
HOW LIKELY ARE YOU TO NOD OFF OR FALL ASLEEP WHEN YOU ARE A PASSENGER IN A CAR FOR AN HOUR WITHOUT A BREAK: WOULD NEVER DOZE
HOW LIKELY ARE YOU TO NOD OFF OR FALL ASLEEP WHILE SITTING AND TALKING TO SOMEONE: WOULD NEVER DOZE
HOW LIKELY ARE YOU TO NOD OFF OR FALL ASLEEP WHILE SITTING QUIETLY AFTER LUNCH WITHOUT ALCOHOL: WOULD NEVER DOZE
HOW LIKELY ARE YOU TO NOD OFF OR FALL ASLEEP WHILE SITTING INACTIVE IN A PUBLIC PLACE: WOULD NEVER DOZE
HOW LIKELY ARE YOU TO NOD OFF OR FALL ASLEEP IN A CAR, WHILE STOPPED FOR A FEW MINUTES IN TRAFFIC: WOULD NEVER DOZE

## 2024-11-19 NOTE — PROGRESS NOTES
[facility] [address] [address]  Phone: [phone]    Patient:  Jonny Quinones, Date of birth 1975  Date of Visit:  11/19/2024  Referring Provider Robe Gaston                  Reason for Sleep Consult:     Jonny Quinones is a 49 year old male with known diagnosis of severe obstructive sleep apnea on CPAP 10 cm of water is here to reestablish his ongoing sleep medicine care.           Assessment and Plan:   49 years old male with history of factor V Leiden, deep venous thrombosis, generalized anxiety, chronic pain and gout was initially diagnosed with severe obstructive sleep apnea in November 2015 with a combined apnea hypopnea index of 57.5/h.  He has been using CPAP at 10 cm of water with a fullface interface since then.  He is here to reestablish his ongoing sleep medicine care, request for a replacement CPAP device as the machine is malfunctioning and complains of having difficulty falling asleep with symptoms of restlessness in his legs.  Summary Sleep Diagnoses:    Sleep disordered breathing.  History of obstructive sleep apnea as diagnosed with a type I polysomnogram in November 2015 at the [facility] sleep [place].  His combined apnea hypopnea index of 57.5/h which was optimally treated with CPAP 10 cm of water using a fullface interface.  His most recent therapy data confirms excellent CPAP compliance of over 93% greater than 4-hour nightly use.  The CPAP therapy is optimal with residual AHI of 2.6 events per hour.  His old machine has been malfunctioning with frequent abrupt disruption and pressure to build delivery and making noise throughout the night.  Restless leg syndrome.  Jonny describes a sense of unease and irritability involving his lower extremities which bothers him at night when he lays in his bed.  Frequently he has to stretch his legs or walk around or give himself a magnesium supplement massage.  This does  impact his ability to fall asleep and occasionally has woken him from his sleep at night.  Insomnia.  Described primarily as sleep initiation type but occasional sleep maintenance episodes do occur.  The likely trigger involves going to bed earlier than his usual bedtime, untreated or partially treated restless legs symptoms compounded by maladaptive behavior.      Comorbid Diagnoses:    Class III obesity.  Generalized anxiety disorder.  Factor V Leiden, with history of DVT  Gout  Chronic pain    Summary Recommendations:    Replace current CPAP with auto titrating CPAP 8-12 cm of water using a fullface interface.  Patient will be enrolled in our sleep therapy management program for close assessment of ongoing treatment, compliance and effectiveness.  Most recent lab workup in August 2024 shows normal iron levels and stores and overall normal ferritin levels.  Patient is advised to start taking pregabalin 75 mg 2 hours prior to intended sleep to manage symptoms of restless leg syndrome.  Sleep hygiene was discussed in detail with focus on stimulus control, going to bed no earlier than 10:30 PM and maintaining a consistent bed and wake time.  Brief behavioral therapy counseling was conducted with focus on maintaining healthy sleep ritual, consistency in bed and wake time, stimulus control and constricting sleep and bedtime.    Summary Counseling:  See instructions    Counseling included a comprehensive review of diagnostic and therapeutic strategies as well as risks of inadequate therapy.  Educational materials provided in instructions.             History of Present Illness:     Jonny Quinones is a 49 year old male with above-mentioned medical history was initially diagnosed with severe obstructive sleep apnea at the [facility] sleep center on November 13, 2015.  The type I polysomnogram confirmed a combined apnea-hypopnea index of 57.5/h.  Patient had limited sleep during the study of only 85 minutes with a poor  sleep efficiency of 58.4%.  He had an increased arousal index at 70.2/h.  This likely suggest possible underestimation of the severity of his sleep apnea.  Nevertheless the prescribed CPAP at 10 cm of water has been optimum in treating his underlying obstructive sleep apnea with his most recent therapy data showing excellent CPAP compliance and overall pressure therapy with a residual AHI of 2.6 events per hour.    Jonny is here with his wife.  They usually go to bed around 9 PM and will watch television for an hour before falling asleep between 10 PM to 10:30 PM.  Once asleep he wakes up at least once to go to the bathroom and on occasion will have trouble falling back to sleep due to discomfort in his legs.  He does describe of irritability and uneasiness involving his lower extremities which impacts him almost daily when he lays down in his bed.  He usually stretches his leg and occasionally has to use a magnesium oil massage.  He is waking up spontaneously around 6:30 AM and does feel well rested at least in the earlier part of the day.  He denies symptoms of hypnagogic, hypnopompic hallucinations, gasp arousals, orthopnea or paroxysmal nocturnal dyspnea.      PREVIOUS IN- LAB or HOME SLEEP STUDIES:   Date: 11/13/2015   TYPE: Type I PSG   AHI: 57.5/h   Intervention:   Sleep Architecture: Total sleep time 85 minutes, arousal index 70.2/h, sleep efficiency 58.4%      SLEEP-WAKE SCHEDULE:     Jonny Quinones     -Describes themself as neither a morning or night person;  prefer to go to sleep at 10:00 PM and wake up at 8:00 AM.     -Naps 0 times per day; takes no inadvertant naps.     Jonny Quinones    -ON WEEKDAYS, goes to sleep at 9:30 PM during the week; awakens  6:30 AM without an alarm; falls asleep in 30 - 60 minutes; has difficulty falling asleep. Taking 15 mg Melatonin and Advil PM 2 tabs    -ON WEEKENDS, goes to sleep at 9:30 PM.  He wakes up at 6:30 AM without an alarm; falls asleep in 30 - 60  minutes.       -Awakens 1-2 times a night for 20 minutes before falling back to sleep; awakens to go to the bathroom.      Overall, he rates the experience with PAP as 8 (0 poor, 10 great). The mask is comfortable. The mask is not leaking.  He is not snoring with the mask on. He is not having gasp arousals.  He is not having significant oral/nasal dryness. The pressure is comfortable.    Objective:  CPAP Compliance Targets:   >70% days > 4 hours AHI < 5   30 days ending November 19, 2024  Respironics  CPAP 10 cmH2O 30 day usage data:  93% of days with > 4 hours of use. 28/30 days with no use.   Average use 9 hours 49 minutes per day.   Average leak 23.5 L/ minutes.%.    AHI 2.6 events per hour.          SCALES       SLEEP APNEA: Stopbang score: 6         INSOMNIA:  Insomnia severity score: 4       SLEEPINESS: Rose sleepiness scale: 1 [normal < 11]   Drowsy driving/near accidents: Denies.  Consequences: Not applicable       PHQ9:     SLEEP COMPLAINTS:  Cardio-respiratory    Snoring: Not snoring while using CPAP/week  Dyspnea: Denies  Morning headaches or confusion: Denies  Coexisting Lung disease: Denies    Coexisting Heart disease: Denies    Does patient have a bed partner: Denies  Has bed partner been sleeping separately because of snoring: Denies            RLS Screen: When you try to relax in the evening or sleep at  night, do you ever have unpleasant, restless feelings in your  legs that can be relieved by walking or movement?  See above.    Periodic limb movement: Does describe restless sleep as confirmed by his wife with frequent kicking during the night.    Narcolepsy: Denies sudden urges of sleep attacks  Cataplexy: Denies   Sleep paralysis: Denies    Hallucinations:   Denies       Sleep Behaviors:  Leg symptoms/movements: See above  Motor restlessness: Denies  Night terrors: Denies  Bruxism: Denies  Automatic behaviors: None    Other subjective complaints:  Anxiety or rumination: Denies  Pain and  discomfort at  night: Denies  Waking up with heart pounding or racing: Denies  GERD or aspiration: Denies         Parasomnia:   NREM: Denies recurrent persistent confusional arousal, night eating, sleep walking or sleep terrors   REM: Denies dream enactment; injuries          Medications:     Current Outpatient Medications   Medication Sig Dispense Refill    amLODIPine (NORVASC) 10 MG tablet Take 1 tablet (10 mg) by mouth daily. 90 tablet 0    Cholecalciferol (VITAMIN D3) 50 MCG (2000 UT) CAPS Take 2 capsules by mouth once daily. 180 capsule 0    citalopram (CELEXA) 20 MG tablet Take 1 tablet (20 mg) by mouth daily. 90 tablet 0    hydrochlorothiazide (HYDRODIURIL) 25 MG tablet Take 1 tablet (25 mg) by mouth daily. 90 tablet 1    metoprolol succinate ER (TOPROL XL) 100 MG 24 hr tablet Take 1 tablet (100 mg) by mouth daily. 90 tablet 0    metoprolol succinate ER (TOPROL XL) 50 MG 24 hr tablet Take 1 tablet (50 mg) by mouth daily. 90 tablet 0    olmesartan (BENICAR) 40 MG tablet Take 1 tablet (40 mg) by mouth daily. 90 tablet 0    potassium chloride ER (K-TAB) 20 MEQ CR tablet Take 1 tablet (20 mEq) by mouth daily. 90 tablet 3    rivaroxaban ANTICOAGULANT (XARELTO ANTICOAGULANT) 20 MG TABS tablet Take 1 tablet (20 mg) by mouth daily (with dinner). 90 tablet 3    UNABLE TO FIND MEDICATION NAME: CPAP machine       Current Facility-Administered Medications   Medication Dose Route Frequency Provider Last Rate Last Admin    lidocaine 1 % injection 0.5 mL  0.5 mL   Jonny Thompson DPM   0.5 mL at 02/08/22 1118    triamcinolone (KENALOG-40) injection 40 mg  40 mg   Jonny Thompson DPM   40 mg at 02/08/22 1118        Allergies   Allergen Reactions    Allopurinol GI Disturbance    Amoxicillin Other (See Comments)     headaches    Bupropion Other (See Comments)    Seasonal Allergies      Dust, mold, pollen      Venlafaxine Other (See Comments)            Past Medical History:     Does not need 02 supplement at night    Past Medical History:   Diagnosis Date    Abscess of lung (H) 2/5/2019    Overview:  H/O    Anxiety     Depressive disorder     Factor V Leiden (H)     Gout     Hypertension     Pre-diabetes     Sleep apnea     Vitamin D deficiency              Past Surgical History:    Previous upper airway surgery: None  Past Surgical History:   Procedure Laterality Date    athroereisis  03/27/2009    NE Subtalar    COLONOSCOPY N/A 9/29/2022    Procedure: COLONOSCOPY;  Surgeon: Anoop Dailey MD;  Location:  GI    ENT SURGERY Bilateral 1985    tonsilectomy    EXCISE MASS FINGER Right 11/17/2023    Procedure: EXCISION, MASS, RIGHT LONG FINGER;  Surgeon: Deep Valdivia MD;  Location: [facility] OR    LAPAROSCOPIC GASTRIC ADJUSTABLE BANDING  11/2009    SINUS SURGERY  2001    sphincterotomy  02/2009    lateral internal; anal fissure            Social History:     Social History     Tobacco Use    Smoking status: Never     Passive exposure: Never    Smokeless tobacco: Never   Substance Use Topics    Alcohol use: No         Chemical History:     Tobacco: Denies     Uses 2 to 3 cups of coffee per day. Last caffeine intake is usually before 3 PM    Supplements for wakefulness: Denies.    EtOH: Occasional   Recreational Drugs: Denies.    Psych Hx:   Generalized anxiety disorder unaware  Current dangers to self or others:none           Family History:     Family History   Problem Relation Age of Onset    Allergies Mother     Blood Disease Mother         Leiden Factor 5    Cerebrovascular Disease Mother     Hypertension Father     Hypertension Maternal Grandmother     Peripheral Vascular Disease Maternal Grandmother     Thrombosis Paternal Grandfather     Allergies Brother     Breast Cancer Paternal Aunt     Colon Cancer No family hx of         Sleep Family Hx:        RLS-unaware   TREMAINE -unaware  Insomnia -unaware  Parasomnia -unaware         Review of Systems:     A complete 10 point review of systems was negative other than HPI or as  "commented below:   Jonny Quinones has gained 5-10 pounds in the last year.      CONSTITUTIONAL: NEGATIVE for weight gain/loss, fever, chills, sweats or night sweats, drug allergies.  EYES: NEGATIVE for changes in vision, blind spots, double vision.  ENT: NEGATIVE for ear pain, sore throat, sinus pain, post-nasal drip, runny nose, bloody nose  CARDIAC: NEGATIVE for fast heartbeats or fluttering in chest, chest pain or pressure, breathlessness when lying flat, swollen legs or swollen feet.  NEUROLOGIC: NEGATIVE headaches, weakness or numbness in the arms or legs.  DERMATOLOGIC: NEGATIVE for rashes, new moles or change in mole(s)  PULMONARY: NEGATIVE SOB at rest, SOB with activity, dry cough, productive cough, coughing up blood, wheezing or whistling when breathing.    GASTROINTESTINAL: NEGATIVE for nausea or vomitting, loose or watery stools, fat or grease in stools, constipation, abdominal pain, bowel movements black in color or blood noted.  GENITOURINARY: NEGATIVE for pain during urination, blood in urine, urinating more frequently than usual, irregular menstrual periods.  MUSCULOSKELETAL: NEGATIVE for muscle pain, bone or joint pain, swollen joints.  ENDOCRINE: NEGATIVE for increased thirst or urination, diabetes.  LYMPHATIC: NEGATIVE for swollen lymph nodes, lumps or bumps in the breasts or nipple discharge.         Physical Examination:     BP (!) 154/101   Pulse 70   Resp 16   Ht 1.892 m (6' 2.49\")   Wt (!) 165.2 kg (364 lb 4.8 oz)   SpO2 95%   BMI 46.16 kg/m    GENERAL: alert and no distress  EYES: Eyes grossly normal to inspection.  No discharge or erythema, or obvious scleral/conjunctival abnormalities.  RESP: No audible wheeze, cough, or visible cyanosis.    SKIN: Visible skin clear. No significant rash, abnormal pigmentation or lesions.  NEURO: Cranial nerves grossly intact.  Mentation and speech appropriate for age.  PSYCH: Appropriate affect, tone, and pace of words           Data: All pertinent " "previous laboratory data reviewed     No results found for: \"PH\", \"PHARTERIAL\", \"PO2\", \"[id number]\", \"SAT\", \"PCO2\", \"HCO3\", \"BASEEXCESS\", \"ANGELICA\", \"BEB\"  Lab Results   Component Value Date    TSH 3.72 08/27/2024     Lab Results   Component Value Date    GLC 93 12/05/2023    GLC 90 07/25/2023     Lab Results   Component Value Date    HGB 16.3 08/27/2024    HGB 17.0 11/17/2009     Lab Results   Component Value Date    BUN 13.5 12/05/2023    BUN 16.4 07/25/2023    CR 0.92 12/05/2023    CR 0.96 07/25/2023     Lab Results   Component Value Date    AST 34 12/05/2023    AST 25 07/25/2023    ALT 42 12/05/2023    ALT 35 07/25/2023    ALKPHOS 68 12/05/2023    ALKPHOS 68 07/25/2023    BILITOTAL 0.5 12/05/2023    BILITOTAL 0.5 07/25/2023      Latest Reference Range & Units 08/27/24 16:35   Ferritin 31 - 409 ng/mL 387   Iron 61 - 157 ug/dL 61   Iron Binding Capacity 240 - 430 ug/dL 287   Iron Sat Index 15 - 46 % 21     Copy to: Robe Gaston MD 11/19/2024     Total of 50 minutes of time was spent with patient, this includes the interview and exam, and review of the chart/labs/imaging/sleep study/PAP therapy and pertinent clinical data on 11/19/2024. This also includes (greater than 50%) time spent counseling and coordinating care.            "

## 2024-11-19 NOTE — NURSING NOTE
"Chief Complaint   Patient presents with    Sleep Problem     Establish care, need new machine and supplies        Initial BP (!) 154/101   Pulse 70   Resp 16   Ht 1.892 m (6' 2.49\")   Wt (!) 165.2 kg (364 lb 4.8 oz)   SpO2 95%   BMI 46.16 kg/m   Estimated body mass index is 46.16 kg/m  as calculated from the following:    Height as of this encounter: 1.892 m (6' 2.49\").    Weight as of this encounter: 165.2 kg (364 lb 4.8 oz).    Medication Reconciliation: complete    Neck circumference: 20 inches / 50 centimeters.    ADELA Rios    "

## 2024-11-26 DIAGNOSIS — I10 ESSENTIAL HYPERTENSION: ICD-10-CM

## 2024-11-26 RX ORDER — OLMESARTAN MEDOXOMIL 40 MG/1
40 TABLET ORAL DAILY
Qty: 90 TABLET | Refills: 0 | Status: SHIPPED | OUTPATIENT
Start: 2024-11-26

## 2024-11-26 RX ORDER — AMLODIPINE BESYLATE 10 MG/1
10 TABLET ORAL DAILY
Qty: 90 TABLET | Refills: 0 | Status: SHIPPED | OUTPATIENT
Start: 2024-11-26

## 2024-12-09 DIAGNOSIS — F41.1 ANXIETY STATE: ICD-10-CM

## 2024-12-09 RX ORDER — CITALOPRAM HYDROBROMIDE 20 MG/1
20 TABLET ORAL DAILY
Qty: 90 TABLET | Refills: 1 | Status: SHIPPED | OUTPATIENT
Start: 2024-12-09

## 2024-12-19 ENCOUNTER — E-VISIT (OUTPATIENT)
Dept: URGENT CARE | Facility: CLINIC | Age: 49
End: 2024-12-19
Payer: COMMERCIAL

## 2024-12-19 DIAGNOSIS — M10.9 ACUTE GOUT OF FOOT, UNSPECIFIED CAUSE, UNSPECIFIED LATERALITY: Primary | ICD-10-CM

## 2024-12-19 RX ORDER — PREDNISONE 20 MG/1
40 TABLET ORAL DAILY
Qty: 10 TABLET | Refills: 0 | Status: SHIPPED | OUTPATIENT
Start: 2024-12-19 | End: 2024-12-24

## 2024-12-19 NOTE — PATIENT INSTRUCTIONS
Jonny Quinones,    Your symptom description is consistent with gout. I sent in a prescription for prednisone 40mg daily for 5 days. Take this as directed. Please be seen in clinic by your primary care provider or at urgent care if symptoms worsen or do not resolve with this treatment.    Thanks for choosing us as a partner in your care,    OWEN Pena [facility]

## 2025-01-23 DIAGNOSIS — I10 ESSENTIAL HYPERTENSION: ICD-10-CM

## 2025-01-23 RX ORDER — METOPROLOL SUCCINATE 50 MG/1
50 TABLET, EXTENDED RELEASE ORAL DAILY
Qty: 90 TABLET | Refills: 0 | Status: SHIPPED | OUTPATIENT
Start: 2025-01-23

## 2025-01-23 RX ORDER — METOPROLOL SUCCINATE 100 MG/1
100 TABLET, EXTENDED RELEASE ORAL DAILY
Qty: 90 TABLET | Refills: 0 | Status: SHIPPED | OUTPATIENT
Start: 2025-01-23

## 2025-02-02 ENCOUNTER — HEALTH MAINTENANCE LETTER (OUTPATIENT)
Age: 50
End: 2025-02-02

## 2025-02-12 ENCOUNTER — PATIENT OUTREACH (OUTPATIENT)
Dept: CARE COORDINATION | Facility: CLINIC | Age: 50
End: 2025-02-12
Payer: COMMERCIAL

## 2025-02-25 DIAGNOSIS — I10 ESSENTIAL HYPERTENSION: ICD-10-CM

## 2025-02-25 RX ORDER — AMLODIPINE BESYLATE 10 MG/1
10 TABLET ORAL DAILY
Qty: 90 TABLET | Refills: 0 | Status: SHIPPED | OUTPATIENT
Start: 2025-02-25

## 2025-02-25 RX ORDER — OLMESARTAN MEDOXOMIL 40 MG/1
40 TABLET ORAL DAILY
Qty: 90 TABLET | Refills: 0 | Status: SHIPPED | OUTPATIENT
Start: 2025-02-25

## 2025-02-26 RX ORDER — AMLODIPINE BESYLATE 10 MG/1
10 TABLET ORAL DAILY
Qty: 90 TABLET | Refills: 0 | OUTPATIENT
Start: 2025-02-26

## 2025-02-26 RX ORDER — HYDROCHLOROTHIAZIDE 25 MG/1
25 TABLET ORAL DAILY
Qty: 90 TABLET | Refills: 1 | Status: SHIPPED | OUTPATIENT
Start: 2025-02-26

## 2025-02-26 RX ORDER — OLMESARTAN MEDOXOMIL 40 MG/1
40 TABLET ORAL DAILY
Qty: 90 TABLET | Refills: 0 | OUTPATIENT
Start: 2025-02-26

## 2025-04-03 ENCOUNTER — TELEPHONE (OUTPATIENT)
Dept: FAMILY MEDICINE | Facility: CLINIC | Age: 50
End: 2025-04-03
Payer: COMMERCIAL

## 2025-04-03 NOTE — TELEPHONE ENCOUNTER
Patient Quality Outreach    Patient is due for the following:   Hypertension -  Hypertension follow-up visit      Topic Date Due    Pneumococcal Vaccine (1 of 1 - PCV) Never done    Zoster (Shingles) Vaccine (1 of 2) Never done    Flu Vaccine (1) 09/01/2024    COVID-19 Vaccine (3 - 2024-25 season) 09/01/2024       Action(s) Taken:   Schedule a office visit for HTN    Type of outreach:    Sent Prompt.ly message.    Questions for provider review:    None         Oriana Rausch  Chart routed to None.

## 2025-04-21 DIAGNOSIS — I10 ESSENTIAL HYPERTENSION: ICD-10-CM

## 2025-04-21 RX ORDER — METOPROLOL SUCCINATE 50 MG/1
50 TABLET, EXTENDED RELEASE ORAL DAILY
Qty: 90 TABLET | Refills: 0 | Status: SHIPPED | OUTPATIENT
Start: 2025-04-21

## 2025-04-21 RX ORDER — METOPROLOL SUCCINATE 100 MG/1
100 TABLET, EXTENDED RELEASE ORAL DAILY
Qty: 90 TABLET | Refills: 0 | Status: SHIPPED | OUTPATIENT
Start: 2025-04-21

## 2025-04-21 NOTE — TELEPHONE ENCOUNTER
Patient is overdue for some labs (some abnormals at ER visits as well) and BP has been elevated at recent visits. Recommend he get his annual soon

## 2025-04-22 RX ORDER — METOPROLOL SUCCINATE 100 MG/1
100 TABLET, EXTENDED RELEASE ORAL DAILY
Qty: 90 TABLET | Refills: 0 | OUTPATIENT
Start: 2025-04-22

## 2025-04-22 RX ORDER — METOPROLOL SUCCINATE 50 MG/1
50 TABLET, EXTENDED RELEASE ORAL DAILY
Qty: 90 TABLET | Refills: 0 | OUTPATIENT
Start: 2025-04-22

## 2025-04-22 NOTE — TELEPHONE ENCOUNTER
LVM message requesting a call back for an appt. Two more attempts will be made.     Valentina Hung   [facility]

## 2025-04-23 NOTE — TELEPHONE ENCOUNTER
LVM requesting a call back for an appt. One more attempt will be made.     Valentina Hung   [facility]

## 2025-05-17 SDOH — HEALTH STABILITY: PHYSICAL HEALTH: ON AVERAGE, HOW MANY DAYS PER WEEK DO YOU ENGAGE IN MODERATE TO STRENUOUS EXERCISE (LIKE A BRISK WALK)?: 0 DAYS

## 2025-05-17 SDOH — HEALTH STABILITY: PHYSICAL HEALTH: ON AVERAGE, HOW MANY MINUTES DO YOU ENGAGE IN EXERCISE AT THIS LEVEL?: 0 MIN

## 2025-05-17 ASSESSMENT — SOCIAL DETERMINANTS OF HEALTH (SDOH): HOW OFTEN DO YOU GET TOGETHER WITH FRIENDS OR RELATIVES?: ONCE A WEEK

## 2025-05-20 ENCOUNTER — RESULTS FOLLOW-UP (OUTPATIENT)
Dept: FAMILY MEDICINE | Facility: CLINIC | Age: 50
End: 2025-05-20

## 2025-05-20 ENCOUNTER — OFFICE VISIT (OUTPATIENT)
Dept: FAMILY MEDICINE | Facility: CLINIC | Age: 50
End: 2025-05-20
Payer: COMMERCIAL

## 2025-05-20 VITALS
BODY MASS INDEX: 40.43 KG/M2 | HEART RATE: 58 BPM | SYSTOLIC BLOOD PRESSURE: 146 MMHG | OXYGEN SATURATION: 97 % | DIASTOLIC BLOOD PRESSURE: 96 MMHG | WEIGHT: 315 LBS | HEIGHT: 74 IN | RESPIRATION RATE: 16 BRPM

## 2025-05-20 DIAGNOSIS — I10 ESSENTIAL HYPERTENSION: ICD-10-CM

## 2025-05-20 DIAGNOSIS — G89.29 CHRONIC LOW BACK PAIN WITHOUT SCIATICA, UNSPECIFIED BACK PAIN LATERALITY: ICD-10-CM

## 2025-05-20 DIAGNOSIS — M79.673 CHRONIC FOOT PAIN, UNSPECIFIED LATERALITY: ICD-10-CM

## 2025-05-20 DIAGNOSIS — E67.3 HYPERVITAMINOSIS D: ICD-10-CM

## 2025-05-20 DIAGNOSIS — Z00.00 ROUTINE GENERAL MEDICAL EXAMINATION AT A HEALTH CARE FACILITY: Primary | ICD-10-CM

## 2025-05-20 DIAGNOSIS — D68.51 FACTOR V LEIDEN: ICD-10-CM

## 2025-05-20 DIAGNOSIS — R19.4 BOWEL HABIT CHANGES: ICD-10-CM

## 2025-05-20 DIAGNOSIS — M54.50 CHRONIC LOW BACK PAIN WITHOUT SCIATICA, UNSPECIFIED BACK PAIN LATERALITY: ICD-10-CM

## 2025-05-20 DIAGNOSIS — G89.29 CHRONIC FOOT PAIN, UNSPECIFIED LATERALITY: ICD-10-CM

## 2025-05-20 PROBLEM — E66.01 MORBID OBESITY (H): Status: RESOLVED | Noted: 2019-02-05 | Resolved: 2025-05-20

## 2025-05-20 LAB
ALBUMIN SERPL BCG-MCNC: 4.1 G/DL (ref 3.5–5.2)
ALP SERPL-CCNC: 70 U/L (ref 40–150)
ALT SERPL W P-5'-P-CCNC: 37 U/L (ref 0–70)
ANION GAP SERPL CALCULATED.3IONS-SCNC: 10 MMOL/L (ref 7–15)
AST SERPL W P-5'-P-CCNC: 33 U/L (ref 0–45)
BILIRUB SERPL-MCNC: 0.7 MG/DL
BUN SERPL-MCNC: 15.3 MG/DL (ref 6–20)
CALCIUM SERPL-MCNC: 9.2 MG/DL (ref 8.8–10.4)
CHLORIDE SERPL-SCNC: 100 MMOL/L (ref 98–107)
CHOLEST SERPL-MCNC: 184 MG/DL
CREAT SERPL-MCNC: 1.1 MG/DL (ref 0.67–1.17)
EGFRCR SERPLBLD CKD-EPI 2021: 81 ML/MIN/1.73M2
FASTING STATUS PATIENT QL REPORTED: YES
FASTING STATUS PATIENT QL REPORTED: YES
GLUCOSE SERPL-MCNC: 110 MG/DL (ref 70–99)
HCO3 SERPL-SCNC: 28 MMOL/L (ref 22–29)
HDLC SERPL-MCNC: 34 MG/DL
LDLC SERPL CALC-MCNC: 116 MG/DL
NONHDLC SERPL-MCNC: 150 MG/DL
POTASSIUM SERPL-SCNC: 3.5 MMOL/L (ref 3.4–5.3)
PROT SERPL-MCNC: 7.2 G/DL (ref 6.4–8.3)
SODIUM SERPL-SCNC: 138 MMOL/L (ref 135–145)
TRIGL SERPL-MCNC: 171 MG/DL
VIT D+METAB SERPL-MCNC: 40 NG/ML (ref 20–50)

## 2025-05-20 PROCEDURE — 80061 LIPID PANEL: CPT | Performed by: PHYSICIAN ASSISTANT

## 2025-05-20 PROCEDURE — 1125F AMNT PAIN NOTED PAIN PRSNT: CPT | Performed by: PHYSICIAN ASSISTANT

## 2025-05-20 PROCEDURE — G2211 COMPLEX E/M VISIT ADD ON: HCPCS | Performed by: PHYSICIAN ASSISTANT

## 2025-05-20 PROCEDURE — 99214 OFFICE O/P EST MOD 30 MIN: CPT | Mod: 25 | Performed by: PHYSICIAN ASSISTANT

## 2025-05-20 PROCEDURE — 3077F SYST BP >= 140 MM HG: CPT | Performed by: PHYSICIAN ASSISTANT

## 2025-05-20 PROCEDURE — 3079F DIAST BP 80-89 MM HG: CPT | Performed by: PHYSICIAN ASSISTANT

## 2025-05-20 PROCEDURE — 99396 PREV VISIT EST AGE 40-64: CPT | Performed by: PHYSICIAN ASSISTANT

## 2025-05-20 PROCEDURE — 82306 VITAMIN D 25 HYDROXY: CPT | Performed by: PHYSICIAN ASSISTANT

## 2025-05-20 PROCEDURE — 36415 COLL VENOUS BLD VENIPUNCTURE: CPT | Performed by: PHYSICIAN ASSISTANT

## 2025-05-20 PROCEDURE — 80053 COMPREHEN METABOLIC PANEL: CPT | Performed by: PHYSICIAN ASSISTANT

## 2025-05-20 RX ORDER — METOPROLOL SUCCINATE 100 MG/1
100 TABLET, EXTENDED RELEASE ORAL DAILY
Qty: 90 TABLET | Refills: 2 | Status: SHIPPED | OUTPATIENT
Start: 2025-05-20

## 2025-05-20 RX ORDER — OLMESARTAN MEDOXOMIL 40 MG/1
40 TABLET ORAL DAILY
Qty: 90 TABLET | Refills: 2 | Status: SHIPPED | OUTPATIENT
Start: 2025-05-20

## 2025-05-20 RX ORDER — CHOLESTYRAMINE 4 G/9G
1 POWDER, FOR SUSPENSION ORAL DAILY
Qty: 90 PACKET | Refills: 0 | Status: SHIPPED | OUTPATIENT
Start: 2025-05-20

## 2025-05-20 RX ORDER — HYDROCHLOROTHIAZIDE 25 MG/1
25 TABLET ORAL DAILY
Qty: 90 TABLET | Refills: 1 | Status: SHIPPED | OUTPATIENT
Start: 2025-05-20

## 2025-05-20 RX ORDER — METOPROLOL SUCCINATE 50 MG/1
50 TABLET, EXTENDED RELEASE ORAL DAILY
Qty: 90 TABLET | Refills: 2 | Status: SHIPPED | OUTPATIENT
Start: 2025-05-20

## 2025-05-20 ASSESSMENT — ANXIETY QUESTIONNAIRES
1. FEELING NERVOUS, ANXIOUS, OR ON EDGE: SEVERAL DAYS
5. BEING SO RESTLESS THAT IT IS HARD TO SIT STILL: SEVERAL DAYS
7. FEELING AFRAID AS IF SOMETHING AWFUL MIGHT HAPPEN: SEVERAL DAYS
IF YOU CHECKED OFF ANY PROBLEMS ON THIS QUESTIONNAIRE, HOW DIFFICULT HAVE THESE PROBLEMS MADE IT FOR YOU TO DO YOUR WORK, TAKE CARE OF THINGS AT HOME, OR GET ALONG WITH OTHER PEOPLE: VERY DIFFICULT
6. BECOMING EASILY ANNOYED OR IRRITABLE: SEVERAL DAYS
3. WORRYING TOO MUCH ABOUT DIFFERENT THINGS: SEVERAL DAYS
GAD7 TOTAL SCORE: 6
4. TROUBLE RELAXING: NOT AT ALL
GAD7 TOTAL SCORE: 6
2. NOT BEING ABLE TO STOP OR CONTROL WORRYING: SEVERAL DAYS

## 2025-05-20 ASSESSMENT — PAIN SCALES - GENERAL: PAINLEVEL_OUTOF10: MODERATE PAIN (4)

## 2025-05-20 NOTE — PATIENT INSTRUCTIONS
Check BP at home a few times weekly and let me know if getting regularly above 140/90    Patient Education   Preventive Care Advice   This is general advice given by our system to help you stay healthy. However, your care team may have specific advice just for you. Please talk to your care team about your preventive care needs.  Nutrition  Eat 5 or more servings of fruits and vegetables each day.  Try wheat bread, brown rice and whole grain pasta (instead of white bread, rice, and pasta).  Get enough calcium and vitamin D. Check the label on foods and aim for 100% of the RDA (recommended daily allowance).  Lifestyle  Exercise at least 150 minutes each week  (30 minutes a day, 5 days a week).  Do muscle strengthening activities 2 days a week. These help control your weight and prevent disease.  No smoking.  Wear sunscreen to prevent skin cancer.  Have a dental exam and cleaning every 6 months.  Yearly exams  See your health care team every year to talk about:  Any changes in your health.  Any medicines your care team has prescribed.  Preventive care, family planning, and ways to prevent chronic diseases.  Shots (vaccines)   HPV shots (up to age 26), if you've never had them before.  Hepatitis B shots (up to age 59), if you've never had them before.  COVID-19 shot: Get this shot when it's due.  Flu shot: Get a flu shot every year.  Tetanus shot: Get a tetanus shot every 10 years.  Pneumococcal, hepatitis A, and RSV shots: Ask your care team if you need these based on your risk.  Shingles shot (for age 50 and up)  General health tests  Diabetes screening:  Starting at age 35, Get screened for diabetes at least every 3 years.  If you are younger than age 35, ask your care team if you should be screened for diabetes.  Cholesterol test: At age 39, start having a cholesterol test every 5 years, or more often if advised.  Bone density scan (DEXA): At age 50, ask your care team if you should have this scan for osteoporosis  (brittle bones).  Hepatitis C: Get tested at least once in your life.  STIs (sexually transmitted infections)  Before age 24: Ask your care team if you should be screened for STIs.  After age 24: Get screened for STIs if you're at risk. You are at risk for STIs (including HIV) if:  You are sexually active with more than one person.  You don't use condoms every time.  You or a partner was diagnosed with a sexually transmitted infection.  If you are at risk for HIV, ask about PrEP medicine to prevent HIV.  Get tested for HIV at least once in your life, whether you are at risk for HIV or not.  Cancer screening tests  Cervical cancer screening: If you have a cervix, begin getting regular cervical cancer screening tests starting at age 21.  Breast cancer scan (mammogram): If you've ever had breasts, begin having regular mammograms starting at age 40. This is a scan to check for breast cancer.  Colon cancer screening: It is important to start screening for colon cancer at age 45.  Have a colonoscopy test every 10 years (or more often if you're at risk) Or, ask your provider about stool tests like a FIT test every year or Cologuard test every 3 years.  To learn more about your testing options, visit:   .  For help making a decision, visit:   https://bit.ly/fr24419.  Prostate cancer screening test: If you have a prostate, ask your care team if a prostate cancer screening test (PSA) at age 55 is right for you.  Lung cancer screening: If you are a current or former smoker ages 50 to 80, ask your care team if ongoing lung cancer screenings are right for you.  For informational purposes only. Not to replace the advice of your health care provider. Copyright   2023 [employer]. All rights reserved. Clinically reviewed by the [facility] Transitions Program. Talk[employer] [id number] - REV 01/24.

## 2025-05-20 NOTE — PROGRESS NOTES
Preventive Care Visit  [facility] SHIRLEY Gaston PA-C, Family Medicine  May 20, 2025      Assessment & Plan     Routine general medical examination at a health care facility  Reviewed personal and family history. Reviewed age appropriate screenings. Recommended any needed vaccinations.    Body mass index (BMI) 45.0-49.9, adult (H)  We did discuss considering GLP1 in hopes that BP and chronic pains would be benefited. He is going to think about these. We did discuss some of the potential side effects  - Lipid panel reflex to direct LDL Fasting; Future  - Comprehensive metabolic panel (BMP + Alb, Alk Phos, ALT, AST, Total. Bili, TP); Future  - Lipid panel reflex to direct LDL Fasting  - Comprehensive metabolic panel (BMP + Alb, Alk Phos, ALT, AST, Total. Bili, TP)    Chronic low back pain without sciatica, unspecified back pain laterality  Not addressed today but this is chronic. Has been seeing chiropractor but not getting a lot of benefit. Recommend he consider following with spine team; he will consider    Chronic foot pain, unspecified laterality  Should continue with podiatry  - Orthopedic  Referral; Future    Factor V Leiden  On DOAC thru Heme    Hypervitaminosis D  Updating his levels  - Vitamin D Deficiency; Future  - Vitamin D Deficiency    Bowel habit changes  He mentions thinking he has chronic IBS. Tried this from a family member and saw huge improvements. Ok for trial. Follow up 3 months to discuss  - cholestyramine (QUESTRAN) 4 g packet; Take 1 packet (4 g) by mouth daily.    Essential hypertension  Not at goal; would like him to check at home as well. If still high, plan to switch to coreg from metorprolol  - olmesartan (BENICAR) 40 MG tablet; Take 1 tablet (40 mg) by mouth daily.  - metoprolol succinate ER (TOPROL XL) 50 MG 24 hr tablet; Take 1 tablet (50 mg) by mouth daily.  - metoprolol succinate ER (TOPROL XL) 100 MG 24 hr tablet; Take 1 tablet (100 mg) by mouth  "daily.  - hydrochlorothiazide (HYDRODIURIL) 25 MG tablet; Take 1 tablet (25 mg) by mouth daily.    The longitudinal plan of care for the diagnosis(es)/condition(s) as documented were addressed during this visit. Due to the added complexity in care, I will continue to support Jonny in the subsequent management and with ongoing continuity of care.          BMI  Estimated body mass index is 46.99 kg/m  as calculated from the following:    Height as of this encounter: 1.88 m (6' 2\").    Weight as of this encounter: 166 kg (366 lb).       Counseling  Appropriate preventive services were addressed with this patient via screening, questionnaire, or discussion as appropriate for fall prevention, nutrition, physical activity, Tobacco-use cessation, social engagement, weight loss and cognition.  Checklist reviewing preventive services available has been given to the patient.  Reviewed patient's diet, addressing concerns and/or questions.           Debora Fuller is a 50 year old, presenting for the following:  Physical        5/20/2025     9:21 AM   Additional Questions   Roomed by AA   Accompanied by Wife- Nayana      NENO    Jonny Quinones is a 50 year old male who presents today for annual wellness/med check    Did get an injection into the right foot back in fall 2024; helped a lot; didn't get in the left foot    Mentioning symptoms in bowels; changes in consistency/frequency  Thinks he has IBS; long standing  Took family members cholestyramine and noted immediate improvement    One episode of extensive nasal bleeding otherwise tolerating DOAC    Advance Care Planning    Discussed advance care planning with patient; however, patient declined at this time.        5/17/2025   General Health   How would you rate your overall physical health? (!) POOR   Feel stress (tense, anxious, or unable to sleep) Only a little   (!) STRESS CONCERN      5/17/2025   Nutrition   Three or more servings of calcium each day? (!) NO "   Diet: Regular (no restrictions)   How many servings of fruit and vegetables per day? (!) 0-1   How many sweetened beverages each day? (!) 3         5/17/2025   Exercise   Days per week of moderate/strenous exercise 0 days   Average minutes spent exercising at this level 0 min   (!) EXERCISE CONCERN      5/17/2025   Social Factors   Frequency of gathering with friends or relatives Once a week   Worry food won't last until get money to buy more No   Food not last or not have enough money for food? No   Do you have housing? (Housing is defined as stable permanent housing and does not include staying outside in a car, in a tent, in an abandoned building, in an overnight shelter, or couch-surfing.) Yes   Are you worried about losing your housing? No   Lack of transportation? No   Unable to get utilities (heat,electricity)? No         5/17/2025   Fall Risk   Fallen 2 or more times in the past year? No   Trouble with walking or balance? No          5/17/2025   Dental   Dentist two times every year? Yes         Today's PHQ-2 Score:       5/19/2025     8:10 PM   PHQ-2 ( 1999 [employer])   Q1: Little interest or pleasure in doing things 1   Q2: Feeling down, depressed or hopeless 0   PHQ-2 Score 1    Q1: Little interest or pleasure in doing things Several days   Q2: Feeling down, depressed or hopeless Not at all   PHQ-2 Score 1       Patient-reported           5/17/2025   Substance Use   Alcohol more than 3/day or more than 7/wk Not Applicable   Do you use any other substances recreationally? No     Social History     Tobacco Use    Smoking status: Never     Passive exposure: Never    Smokeless tobacco: Never   Vaping Use    Vaping status: Never Used   Substance Use Topics    Alcohol use: No    Drug use: No           5/17/2025   STI Screening   New sexual partner(s) since last STI/HIV test? No   ASCVD Risk   The 10-year ASCVD risk score (Jeffrey CEDILLO, et al., 2019) is: 8.9%    Values used to calculate the score:      Age:  "50 years      Sex: Male      Is Non- : No      Diabetic: No      Tobacco smoker: No      Systolic Blood Pressure: 146 mmHg      Is BP treated: Yes      HDL Cholesterol: 37 mg/dL      Total Cholesterol: 212 mg/dL         Reviewed and updated as needed this visit by Provider                    Lab work is in process  Labs reviewed in EPIC      Review of Systems  Constitutional, HEENT, cardiovascular, pulmonary, gi and gu systems are negative, except as otherwise noted.     Objective    Exam  BP (!) 146/99   Pulse 58   Resp 16   Ht 1.88 m (6' 2\")   Wt (!) 166 kg (366 lb)   SpO2 97%   BMI 46.99 kg/m     Estimated body mass index is 46.99 kg/m  as calculated from the following:    Height as of this encounter: 1.88 m (6' 2\").    Weight as of this encounter: 166 kg (366 lb).    Physical Exam  GENERAL: alert and no distress  EYES: Eyes grossly normal to inspection, PERRL and conjunctivae and sclerae normal  HENT: ear canals and TM's normal, nose and mouth without ulcers or lesions  NECK: no adenopathy, no asymmetry, masses, or scars  RESP: lungs clear to auscultation - no rales, rhonchi or wheezes  CV: regular rates and rhythm  ABDOMEN: morbid obesity limits exam; soft, nontender, no hepatosplenomegaly, no masses and bowel sounds normal  MS: No peripheral edema   BACK: not examined  PSYCH: mentation appears normal, affect normal/bright        Signed Electronically by: Robe Gaston PA-C    "

## 2025-05-21 ENCOUNTER — PATIENT OUTREACH (OUTPATIENT)
Dept: CARE COORDINATION | Facility: CLINIC | Age: 50
End: 2025-05-21
Payer: COMMERCIAL

## (undated) DEVICE — DRSG ABDOMINAL 07 1/2X8" 7197D

## (undated) DEVICE — BLADE KNIFE BEAVER MINI BEAVER6400

## (undated) DEVICE — PREP CHLORAPREP 26ML TINTED ORANGE  260815

## (undated) DEVICE — SOL NACL 0.9% IRRIG 500ML BOTTLE 2F7123

## (undated) DEVICE — CAST PADDING 3" STERILE 9043S

## (undated) DEVICE — KIT ENDO TURNOVER/PROCEDURE W/CLEAN A SCOPE LINERS 103888

## (undated) DEVICE — SU MONOCRYL 4-0 PS-2 27" UND Y426H

## (undated) DEVICE — DRSG STERI STRIP 1X5" R1548

## (undated) DEVICE — GLOVE BIOGEL PI MICRO SZ 7.0 48570

## (undated) DEVICE — PACK HAND CUSTOM ASC

## (undated) DEVICE — SOL WATER IRRIG 500ML BOTTLE 2F7113

## (undated) DEVICE — LINEN ORTHO PACK 5446

## (undated) DEVICE — COVER CAMERA IN-LIGHT DISP LT-C02

## (undated) DEVICE — BNDG ELASTIC 3"X5YDS UNSTERILE 6611-30

## (undated) DEVICE — SU VICRYL 3-0 SH 27" UND J416H

## (undated) RX ORDER — FENTANYL CITRATE 0.05 MG/ML
INJECTION, SOLUTION INTRAMUSCULAR; INTRAVENOUS
Status: DISPENSED
Start: 2022-09-29

## (undated) RX ORDER — LIDOCAINE HYDROCHLORIDE 10 MG/ML
INJECTION, SOLUTION EPIDURAL; INFILTRATION; INTRACAUDAL; PERINEURAL
Status: DISPENSED
Start: 2023-11-17

## (undated) RX ORDER — BUPIVACAINE HYDROCHLORIDE 5 MG/ML
INJECTION, SOLUTION EPIDURAL; INTRACAUDAL
Status: DISPENSED
Start: 2023-11-17